# Patient Record
Sex: MALE | Race: WHITE | NOT HISPANIC OR LATINO | Employment: FULL TIME | URBAN - NONMETROPOLITAN AREA
[De-identification: names, ages, dates, MRNs, and addresses within clinical notes are randomized per-mention and may not be internally consistent; named-entity substitution may affect disease eponyms.]

---

## 2017-05-18 ENCOUNTER — TRANSCRIBE ORDERS (OUTPATIENT)
Dept: ADMINISTRATIVE | Facility: HOSPITAL | Age: 62
End: 2017-05-18

## 2017-05-18 DIAGNOSIS — R06.02 SHORTNESS OF BREATH: Primary | ICD-10-CM

## 2017-05-18 DIAGNOSIS — I10 ESSENTIAL HYPERTENSION: ICD-10-CM

## 2017-05-19 ENCOUNTER — APPOINTMENT (OUTPATIENT)
Dept: CARDIOLOGY | Facility: HOSPITAL | Age: 62
End: 2017-05-19

## 2017-05-23 ENCOUNTER — APPOINTMENT (OUTPATIENT)
Dept: CARDIOLOGY | Facility: HOSPITAL | Age: 62
End: 2017-05-23

## 2017-05-25 ENCOUNTER — HOSPITAL ENCOUNTER (OUTPATIENT)
Dept: CARDIOLOGY | Facility: HOSPITAL | Age: 62
Discharge: HOME | End: 2017-05-25
Admitting: NURSE PRACTITIONER

## 2017-05-25 VITALS
WEIGHT: 290 LBS | BODY MASS INDEX: 39.28 KG/M2 | HEIGHT: 72 IN | HEART RATE: 67 BPM | DIASTOLIC BLOOD PRESSURE: 79 MMHG | SYSTOLIC BLOOD PRESSURE: 143 MMHG

## 2017-05-25 DIAGNOSIS — I10 ESSENTIAL HYPERTENSION: ICD-10-CM

## 2017-05-25 DIAGNOSIS — R06.02 SHORTNESS OF BREATH: ICD-10-CM

## 2017-05-25 LAB
BH CV STRESS BP STAGE 1: NORMAL
BH CV STRESS BP STAGE 2: NORMAL
BH CV STRESS BP STAGE 3: NORMAL
BH CV STRESS BP STAGE 4: NORMAL
BH CV STRESS DOSE DOBUTAMINE STAGE 1: 10
BH CV STRESS DOSE DOBUTAMINE STAGE 2: 20
BH CV STRESS DOSE DOBUTAMINE STAGE 3: 30
BH CV STRESS DOSE DOBUTAMINE STAGE 4: 40
BH CV STRESS DURATION MIN STAGE 1: 3
BH CV STRESS DURATION MIN STAGE 2: 3
BH CV STRESS DURATION MIN STAGE 3: 3
BH CV STRESS DURATION MIN STAGE 4: 4
BH CV STRESS DURATION SEC STAGE 1: 0
BH CV STRESS DURATION SEC STAGE 2: 0
BH CV STRESS DURATION SEC STAGE 3: 0
BH CV STRESS DURATION SEC STAGE 4: 35
BH CV STRESS HR STAGE 1: 72
BH CV STRESS HR STAGE 2: 111
BH CV STRESS HR STAGE 3: 122
BH CV STRESS HR STAGE 4: 137
BH CV STRESS PROTOCOL 1: NORMAL
BH CV STRESS RECOVERY BP: NORMAL MMHG
BH CV STRESS RECOVERY HR: 98 BPM
BH CV STRESS STAGE 1: 1
BH CV STRESS STAGE 2: 2
BH CV STRESS STAGE 3: 3
BH CV STRESS STAGE 4: 4
MAXIMAL PREDICTED HEART RATE: 158 BPM
PERCENT MAX PREDICTED HR: 86.71 %
STRESS BASELINE BP: NORMAL MMHG
STRESS BASELINE HR: 65 BPM
STRESS PERCENT HR: 102 %
STRESS POST EXERCISE DUR MIN: 13 MIN
STRESS POST EXERCISE DUR SEC: 35 SEC
STRESS POST PEAK BP: NORMAL MMHG
STRESS POST PEAK HR: 137 BPM
STRESS TARGET HR: 134 BPM

## 2017-05-25 PROCEDURE — 25010000003 DOBUTAMINE PER 250 MG: Performed by: INTERNAL MEDICINE

## 2017-05-25 PROCEDURE — 93018 CV STRESS TEST I&R ONLY: CPT | Performed by: INTERNAL MEDICINE

## 2017-05-25 PROCEDURE — 93350 STRESS TTE ONLY: CPT | Performed by: INTERNAL MEDICINE

## 2017-05-25 PROCEDURE — C8928 TTE W OR W/O FOL W/CON,STRES: HCPCS

## 2017-05-25 PROCEDURE — 93017 CV STRESS TEST TRACING ONLY: CPT

## 2017-05-25 PROCEDURE — 93352 ADMIN ECG CONTRAST AGENT: CPT | Performed by: INTERNAL MEDICINE

## 2017-05-25 PROCEDURE — 25010000002 PERFLUTREN (DEFINITY) 8.476 MG IN SODIUM CHLORIDE 10 ML INJECTION: Performed by: INTERNAL MEDICINE

## 2017-05-25 RX ORDER — CHLORAL HYDRATE 500 MG
1000 CAPSULE ORAL
COMMUNITY
End: 2020-06-19

## 2017-05-25 RX ORDER — DOBUTAMINE HYDROCHLORIDE 100 MG/100ML
10-50 INJECTION INTRAVENOUS
Status: DISCONTINUED | OUTPATIENT
Start: 2017-05-25 | End: 2017-05-26 | Stop reason: HOSPADM

## 2017-05-25 RX ADMIN — ATROPINE SULFATE 0.6 MG: 0.1 INJECTION, SOLUTION INTRAVENOUS at 14:47

## 2017-05-25 RX ADMIN — SODIUM CHLORIDE 10 ML: 9 INJECTION INTRAMUSCULAR; INTRAVENOUS; SUBCUTANEOUS at 14:34

## 2017-05-25 RX ADMIN — DOBUTAMINE HYDROCHLORIDE 10 MCG/KG/MIN: 100 INJECTION INTRAVENOUS at 14:34

## 2017-06-08 ENCOUNTER — TRANSCRIBE ORDERS (OUTPATIENT)
Dept: ADMINISTRATIVE | Facility: HOSPITAL | Age: 62
End: 2017-06-08

## 2017-06-08 DIAGNOSIS — R06.02 SHORTNESS OF BREATH: Primary | ICD-10-CM

## 2017-06-15 ENCOUNTER — HOSPITAL ENCOUNTER (OUTPATIENT)
Dept: PULMONOLOGY | Facility: HOSPITAL | Age: 62
Discharge: HOME | End: 2017-06-15
Admitting: NURSE PRACTITIONER

## 2017-06-15 VITALS — BODY MASS INDEX: 41.95 KG/M2 | HEIGHT: 70 IN | WEIGHT: 293 LBS

## 2017-06-15 DIAGNOSIS — R06.02 SHORTNESS OF BREATH: ICD-10-CM

## 2017-06-15 PROCEDURE — 94726 PLETHYSMOGRAPHY LUNG VOLUMES: CPT

## 2017-06-15 PROCEDURE — 94729 DIFFUSING CAPACITY: CPT

## 2017-06-15 PROCEDURE — 94010 BREATHING CAPACITY TEST: CPT

## 2017-11-13 ENCOUNTER — HOSPITAL ENCOUNTER (OUTPATIENT)
Dept: CT IMAGING | Age: 62
Discharge: HOME | End: 2017-11-13
Payer: COMMERCIAL

## 2017-11-13 ENCOUNTER — OFFICE VISIT (OUTPATIENT)
Dept: URGENT CARE | Age: 62
End: 2017-11-13
Payer: COMMERCIAL

## 2017-11-13 ENCOUNTER — OFFICE VISIT (OUTPATIENT)
Dept: UROLOGY | Facility: CLINIC | Age: 62
End: 2017-11-13

## 2017-11-13 VITALS
HEART RATE: 68 BPM | WEIGHT: 281 LBS | TEMPERATURE: 99.1 F | SYSTOLIC BLOOD PRESSURE: 174 MMHG | BODY MASS INDEX: 38.06 KG/M2 | DIASTOLIC BLOOD PRESSURE: 103 MMHG | OXYGEN SATURATION: 96 % | HEIGHT: 72 IN

## 2017-11-13 VITALS — HEIGHT: 72 IN | DIASTOLIC BLOOD PRESSURE: 94 MMHG | SYSTOLIC BLOOD PRESSURE: 160 MMHG

## 2017-11-13 DIAGNOSIS — R10.9 FLANK PAIN: ICD-10-CM

## 2017-11-13 DIAGNOSIS — R10.31 RIGHT LOWER QUADRANT ABDOMINAL PAIN: ICD-10-CM

## 2017-11-13 DIAGNOSIS — N20.0 NEPHROLITHIASIS: Primary | ICD-10-CM

## 2017-11-13 DIAGNOSIS — N20.0 NEPHROLITHIASIS: ICD-10-CM

## 2017-11-13 DIAGNOSIS — R31.29 OTHER MICROSCOPIC HEMATURIA: ICD-10-CM

## 2017-11-13 DIAGNOSIS — N20.1 RIGHT URETERAL STONE: Primary | ICD-10-CM

## 2017-11-13 DIAGNOSIS — N20.0 KIDNEY STONES: Primary | ICD-10-CM

## 2017-11-13 LAB
ALBUMIN SERPL-MCNC: 4 G/DL (ref 3.5–5.2)
ALP BLD-CCNC: 95 U/L (ref 40–130)
ALT SERPL-CCNC: 24 U/L (ref 5–41)
ANION GAP SERPL CALCULATED.3IONS-SCNC: 13 MMOL/L (ref 7–19)
AST SERPL-CCNC: 18 U/L (ref 5–40)
BASOPHILS ABSOLUTE: 0.1 K/UL (ref 0–0.2)
BASOPHILS RELATIVE PERCENT: 0.5 % (ref 0–1)
BILIRUB BLD-MCNC: NEGATIVE MG/DL
BILIRUB SERPL-MCNC: 0.6 MG/DL (ref 0.2–1.2)
BILIRUBIN, POC: ABNORMAL
BLOOD URINE, POC: ABNORMAL
BUN BLDV-MCNC: 12 MG/DL (ref 8–23)
CALCIUM SERPL-MCNC: 9.2 MG/DL (ref 8.8–10.2)
CHLORIDE BLD-SCNC: 102 MMOL/L (ref 98–111)
CLARITY, POC: ABNORMAL
CLARITY, POC: CLEAR
CO2: 28 MMOL/L (ref 22–29)
COLOR UR: YELLOW
COLOR, POC: ABNORMAL
CREAT SERPL-MCNC: 0.8 MG/DL (ref 0.5–1.2)
EOSINOPHILS ABSOLUTE: 0.1 K/UL (ref 0–0.6)
EOSINOPHILS RELATIVE PERCENT: 0.6 % (ref 0–5)
GFR NON-AFRICAN AMERICAN: >60
GLUCOSE BLD-MCNC: 117 MG/DL (ref 74–109)
GLUCOSE UR STRIP-MCNC: NEGATIVE MG/DL
GLUCOSE URINE, POC: ABNORMAL
HCT VFR BLD CALC: 45.7 % (ref 42–52)
HEMOGLOBIN: 15.2 G/DL (ref 14–18)
KETONES UR QL: NEGATIVE
KETONES, POC: ABNORMAL
LEUKOCYTE EST, POC: ABNORMAL
LEUKOCYTE EST, POC: ABNORMAL
LYMPHOCYTES ABSOLUTE: 1.7 K/UL (ref 1.1–4.5)
LYMPHOCYTES RELATIVE PERCENT: 17.1 % (ref 20–40)
MCH RBC QN AUTO: 28.5 PG (ref 27–31)
MCHC RBC AUTO-ENTMCNC: 33.3 G/DL (ref 33–37)
MCV RBC AUTO: 85.7 FL (ref 80–94)
MONOCYTES ABSOLUTE: 0.8 K/UL (ref 0–0.9)
MONOCYTES RELATIVE PERCENT: 7.8 % (ref 0–10)
NEUTROPHILS ABSOLUTE: 7.1 K/UL (ref 1.5–7.5)
NEUTROPHILS RELATIVE PERCENT: 73.5 % (ref 50–65)
NITRITE UR-MCNC: NEGATIVE MG/ML
NITRITE, POC: ABNORMAL
PDW BLD-RTO: 13.4 % (ref 11.5–14.5)
PH UR: 6 [PH] (ref 5–8)
PH, POC: 7
PLATELET # BLD: 221 K/UL (ref 130–400)
PMV BLD AUTO: 10.3 FL (ref 9.4–12.4)
POTASSIUM SERPL-SCNC: 4.1 MMOL/L (ref 3.5–5)
PROT UR STRIP-MCNC: NEGATIVE MG/DL
PROTEIN, POC: ABNORMAL
RBC # BLD: 5.33 M/UL (ref 4.7–6.1)
RBC # UR STRIP: ABNORMAL /UL
SODIUM BLD-SCNC: 143 MMOL/L (ref 136–145)
SP GR UR: 1.01 (ref 1–1.03)
SPECIFIC GRAVITY, POC: 1.01
TOTAL PROTEIN: 6.8 G/DL (ref 6.6–8.7)
UROBILINOGEN UR QL: ABNORMAL
UROBILINOGEN, POC: 0.2
WBC # BLD: 9.7 K/UL (ref 4.8–10.8)

## 2017-11-13 PROCEDURE — 99204 OFFICE O/P NEW MOD 45 MIN: CPT | Performed by: UROLOGY

## 2017-11-13 PROCEDURE — 81002 URINALYSIS NONAUTO W/O SCOPE: CPT | Performed by: PHYSICIAN ASSISTANT

## 2017-11-13 PROCEDURE — 74176 CT ABD & PELVIS W/O CONTRAST: CPT

## 2017-11-13 PROCEDURE — 81003 URINALYSIS AUTO W/O SCOPE: CPT | Performed by: UROLOGY

## 2017-11-13 PROCEDURE — 36415 COLL VENOUS BLD VENIPUNCTURE: CPT | Performed by: PHYSICIAN ASSISTANT

## 2017-11-13 PROCEDURE — 99202 OFFICE O/P NEW SF 15 MIN: CPT | Performed by: PHYSICIAN ASSISTANT

## 2017-11-13 RX ORDER — HYDROCODONE BITARTRATE AND ACETAMINOPHEN 5; 325 MG/1; MG/1
1 TABLET ORAL EVERY 6 HOURS PRN
Qty: 20 TABLET | Refills: 0 | Status: SHIPPED | OUTPATIENT
Start: 2017-11-13 | End: 2017-11-20

## 2017-11-13 RX ORDER — LOSARTAN POTASSIUM 100 MG/1
TABLET ORAL
Refills: 3 | Status: ON HOLD | COMMUNITY
Start: 2017-09-18 | End: 2018-10-24

## 2017-11-13 RX ORDER — TAMSULOSIN HYDROCHLORIDE 0.4 MG/1
1 CAPSULE ORAL DAILY
Status: ON HOLD | COMMUNITY
Start: 2017-11-13 | End: 2017-11-18 | Stop reason: SDUPTHER

## 2017-11-13 RX ORDER — MONTELUKAST SODIUM 10 MG/1
TABLET ORAL
Refills: 5 | Status: ON HOLD | COMMUNITY
Start: 2017-10-31 | End: 2017-11-18

## 2017-11-13 RX ORDER — SODIUM CHLORIDE 9 MG/ML
100 INJECTION, SOLUTION INTRAVENOUS CONTINUOUS
Status: CANCELLED | OUTPATIENT
Start: 2017-11-13

## 2017-11-13 RX ORDER — AMLODIPINE BESYLATE 5 MG/1
TABLET ORAL
Refills: 3 | COMMUNITY
Start: 2017-09-18 | End: 2018-10-09 | Stop reason: DRUGHIGH

## 2017-11-13 RX ORDER — TAMSULOSIN HYDROCHLORIDE 0.4 MG/1
0.4 CAPSULE ORAL DAILY
Qty: 30 CAPSULE | Refills: 3 | Status: SHIPPED | OUTPATIENT
Start: 2017-11-13 | End: 2018-10-09 | Stop reason: ALTCHOICE

## 2017-11-13 RX ORDER — HYDROCODONE BITARTRATE AND ACETAMINOPHEN 5; 325 MG/1; MG/1
1 TABLET ORAL EVERY 6 HOURS PRN
COMMUNITY
Start: 2017-11-13 | End: 2017-11-20

## 2017-11-13 ASSESSMENT — ENCOUNTER SYMPTOMS
RHINORRHEA: 0
COUGH: 0
BLOOD IN STOOL: 0
SORE THROAT: 0
VOMITING: 1
DIARRHEA: 0
SHORTNESS OF BREATH: 0
BACK PAIN: 1
ABDOMINAL PAIN: 1
NAUSEA: 1

## 2017-11-13 NOTE — PATIENT INSTRUCTIONS
again.  Preventing future kidney stones  Some changes in your diet may help prevent kidney stones. Depending on the cause of your stones, your doctor may recommend that you:  · Drink plenty of fluids, enough so that your urine is light yellow or clear like water. If you have kidney, heart, or liver disease and have to limit fluids, talk with your doctor before you increase the amount of fluids you drink. · Limit coffee, tea, and alcohol. Also avoid grapefruit juice. · Do not take more than the recommended daily dose of vitamins C and D.  · Avoid antacids such as Gaviscon, Maalox, Mylanta, or Tums. · Limit the amount of salt (sodium) in your diet. · Eat a balanced diet that is not too high in protein. · Limit foods that are high in a substance called oxalate, which can cause kidney stones. These foods include dark green vegetables, rhubarb, chocolate, wheat bran, nuts, cranberries, and beans. When should you call for help? Call your doctor now or seek immediate medical care if:  · You cannot keep down fluids. · Your pain gets worse. · You have a fever or chills. · You have new or worse pain in your back just below your rib cage (the flank area). · You have new or more blood in your urine. Watch closely for changes in your health, and be sure to contact your doctor if:  · You do not get better as expected. Where can you learn more? Go to https://F[employer]em[employer]umpedia. org and sign in to your [employer] account. Enter I228 in the [employer] box to learn more about \"Kidney Stone: Care Instructions. \"     If you do not have an account, please click on the \"Sign Up Now\" link. Current as of: April 3, 2017  Content Version: 11.3  © 2117-9652 [employer]. Care instructions adapted under license by [facility] ([facility]).  If you have questions about a medical condition or this instruction, always ask your healthcare professional. Natalie Lujan disclaims any warranty or liability

## 2017-11-13 NOTE — PROGRESS NOTES
Subjective:      Patient ID: Magdaleno Murillo is a 57 y.o. male. HPI    Alyson Arroyo presents today with right-sided flank pain. Has had some RLQ pain. Symptoms developed Symptoms developed 6 days ago with nausea and vomiting. Back pain developed after that. Though that he had strained a muscle after the vomiting. Has continued to back pain every since then. Had slight nausea this morning. Has had low grade fever sporadically for the past week. Denies diarrhea. Has only had 2 bowel movements in the past 6 days that have been hard and dark. Denies rash. No dysuria. Has noticed urinary frequency and decreased urine volume. BP is elevated today. He is on 2 blood pressure medications and has not taken them yet today. Has taken Tylenol. Took Laura Matlock this morning. Review of Systems   Constitutional: Positive for appetite change and fever. HENT: Negative for congestion, ear pain, rhinorrhea and sore throat. Respiratory: Negative for cough and shortness of breath. Cardiovascular: Negative for chest pain. Gastrointestinal: Positive for abdominal pain, nausea and vomiting. Negative for blood in stool and diarrhea. Genitourinary: Positive for decreased urine volume, flank pain and frequency. Negative for dysuria, hematuria and urgency. Musculoskeletal: Positive for back pain. Skin: Negative for rash. Neurological: Negative for headaches. All other systems reviewed and are negative. Objective:   Physical Exam   Constitutional: He is oriented to person, place, and time. He appears well-developed and well-nourished. No distress. HENT:   Head: Normocephalic and atraumatic. Right Ear: External ear normal.   Left Ear: External ear normal.   Nose: Nose normal.   Mouth/Throat: Oropharynx is clear and moist. No oropharyngeal exudate. Eyes: Conjunctivae are normal. Right eye exhibits no discharge. Left eye exhibits no discharge. Neck: Normal range of motion. Neck supple.    Cardiovascular: Normal rate, regular rhythm and normal heart sounds. No murmur heard. Pulmonary/Chest: Effort normal and breath sounds normal. No respiratory distress. He has no wheezes. He has no rales. Abdominal: Soft. Bowel sounds are normal. He exhibits no distension and no mass. There is no tenderness. There is no rebound and no guarding. Lymphadenopathy:     He has no cervical adenopathy. Neurological: He is alert and oriented to person, place, and time. Skin: Skin is warm and dry. No rash noted. He is not diaphoretic. No erythema. No pallor. Psychiatric: He has a normal mood and affect. His behavior is normal. Judgment and thought content normal.   Nursing note and vitals reviewed. Assessment:       Nephrolithiasis  Flank Pain  Hematuria      Plan:      - Lab: CBC, CMP  - CT abdomen/pelvis with oral contrast:   An 8 mm x 5 mm radiopaque calculus at the right UP   junction and right-sided hydronephrosis. Bilateral small nonobstructing calculi. - Refer to urology for further evaluation and treatment. Patient is being seen today by Dr. Ilda De La Rosa. - Start Flomax today. Appropriate dosage and instructions provided. - Instructed to strain urine and collect stone once passes and bring to lab for pathological evaluation.   - Instructed to go to ER if symptoms worsen before appt with urology.   - Start Norco 5 mg. Sent to pharmacy by Dr. Kendra Swain.   - Continue all other medications as prescribed. - Notify clinic with any questions or concerns.   - Return as needed.

## 2017-11-13 NOTE — PROGRESS NOTES
Subjective    Mr. Edmondson is 61 y.o. male    Chief Complaint: Right Flank Pain    History of Present Illness     Urolithiasis  Patient complains of right flank pain with radiation to the abdomen. Onset of symptoms was abrupt starting 1 week ago with unchanged course since that time. Patient describes the pain as colicky, continuous and rated as moderate. The patient has had nausea and vomiting. There has been no fever or chills. The patient is not complaining of dysuria, frequency, or urgency.  Previous management of stones includes none      The following portions of the patient's history were reviewed and updated as appropriate: allergies, current medications, past family history, past medical history, past social history, past surgical history and problem list.    Review of Systems   Constitutional: Negative for appetite change, chills, fever and unexpected weight change.   HENT: Negative for congestion, ear pain, facial swelling, hearing loss, nosebleeds, trouble swallowing and voice change.    Eyes: Negative for photophobia, pain, discharge and visual disturbance.   Respiratory: Negative for cough, choking, chest tightness and shortness of breath.    Cardiovascular: Negative for chest pain and palpitations.   Gastrointestinal: Positive for abdominal pain. Negative for abdominal distention, blood in stool, constipation, diarrhea, nausea and vomiting.   Endocrine: Negative for cold intolerance, heat intolerance and polydipsia.   Genitourinary: Positive for flank pain. Negative for difficulty urinating, dysuria, frequency, hematuria and urgency.   Musculoskeletal: Negative for arthralgias, joint swelling, neck pain and neck stiffness.   Skin: Negative for pallor and rash.   Allergic/Immunologic: Negative for immunocompromised state.   Neurological: Negative for dizziness, tremors, seizures, syncope, light-headedness and headaches.   Hematological: Negative for adenopathy. Does not bruise/bleed easily.  "  Psychiatric/Behavioral: Negative for agitation, confusion, dysphoric mood, hallucinations, self-injury and suicidal ideas.         Current Outpatient Prescriptions:   •  AMLODIPINE BESYLATE PO, Take  by mouth., Disp: , Rfl:   •  Coenzyme Q10 (CO Q 10 PO), Take  by mouth., Disp: , Rfl:   •  Fexofenadine HCl (ALLEGRA PO), Take  by mouth., Disp: , Rfl:   •  LOSARTAN POTASSIUM PO, Take  by mouth., Disp: , Rfl:   •  montelukast (SINGULAIR) 10 MG tablet, TAKE 1 TABLET BY MOUTH DAILY, Disp: , Rfl: 5  •  NAPROXEN PO, Take  by mouth., Disp: , Rfl:   •  Omega-3 Fatty Acids (FISH OIL) 1000 MG capsule capsule, Take  by mouth Daily With Breakfast., Disp: , Rfl:   •  tamsulosin (FLOMAX) 0.4 MG capsule 24 hr capsule, , Disp: , Rfl:     Past Medical History:   Diagnosis Date   • Hyperlipidemia    • Hypertension        History reviewed. No pertinent surgical history.    Social History     Social History   • Marital status:      Spouse name: N/A   • Number of children: N/A   • Years of education: N/A     Social History Main Topics   • Smoking status: Former Smoker   • Smokeless tobacco: Never Used   • Alcohol use Yes   • Drug use: None   • Sexual activity: Not Asked     Other Topics Concern   • None     Social History Narrative       Family History   Problem Relation Age of Onset   • Hypertension Father        Objective    /94  Ht 72\" (182.9 cm)    Physical Exam   Constitutional: He is oriented to person, place, and time. He appears well-developed and well-nourished. No distress.   HENT:   Head: Normocephalic and atraumatic.   Right Ear: External ear and ear canal normal.   Left Ear: External ear and ear canal normal.   Nose: No nasal deformity. No epistaxis.   Mouth/Throat: Oropharynx is clear and moist. Mucous membranes are not pale, not dry and not cyanotic. Normal dentition. No oropharyngeal exudate.   Neck: Trachea normal. No tracheal tenderness present. No tracheal deviation present. No thyroid mass and no " thyromegaly present.   Pulmonary/Chest: Effort normal. No accessory muscle usage. No respiratory distress. Chest wall is not dull to percussion (No flatness or hyperresonance). He exhibits no mass and no tenderness.   On palpation, no tactile fremitus. All movements are symmetric. No intercostal retraction noted.    Abdominal: Soft. Normal appearance. He exhibits no distension and no mass. There is no hepatosplenomegaly. There is no tenderness. No hernia.   Rectal examination or stool specimen is not indicated.    Musculoskeletal:   Normal gait and station. The spine, ribs, and pelvis are examined. No obvious misalignment or asymmetry. ROM is reasonable for age. No instability. No obvious atrophy, flaccidity or spasticity.    Lymphadenopathy:     He has no cervical adenopathy.        Right: No inguinal adenopathy present.        Left: No inguinal adenopathy present.   Neurological: He is alert and oriented to person, place, and time.   Skin: Skin is warm, dry and intact. No lesion and no rash noted. He is not diaphoretic. No cyanosis. No pallor. Nails show no clubbing.   On palpation, there were no induration, subcutaneous nodules, or tightening   Psychiatric: His speech is normal and behavior is normal. Judgment and thought content normal. His mood appears not anxious. His affect is not labile. He does not exhibit a depressed mood.   Vitals reviewed.          Results for orders placed or performed in visit on 11/13/17   POC Urinalysis Dipstick, Automated   Result Value Ref Range    Color Yellow Yellow, Straw, Dark Yellow, Rachel    Clarity, UA Clear Clear    Glucose, UA Negative Negative, 1000 mg/dL (3+) mg/dL    Bilirubin Negative Negative    Ketones, UA Negative Negative    Specific Gravity  1.015 1.005 - 1.030    Blood, UA Moderate (A) Negative    pH, Urine 6.0 5.0 - 8.0    Protein, POC Negative Negative mg/dL    Urobilinogen, UA 1 E.U./dL  (A) Normal    Leukocytes Small (1+) (A) Negative    Nitrite, UA Negative  Negative   Microscopic Urinalysis  I inspected the urine myself based on the clinical situation including the dipstick urine. The urine is spun in a centrifuge for three minutes. The spun urine shows 12-20 rbc/hpf, none wbc/hpf, none epi/hpf, negative bacteria, negative crystals, and negative casts.     KUB independent review    A KUB is available for me to review today.  The image is inspected for a bowel gas pattern and the general bone structure of the spine and pelvis. The kidneys are then inspected closely.  Renal outline is noted if identifiable. The kidney, collecting system, and anticipated path of the ureter are examined for calcifications including those in the true pelvis.  This film reveals:    On the right there 8mm stone proximal ureter 3mm stone in kidney.    On the left there are no calcificaitons seen in the kidney or the expected course of the ureter. .        Assessment and Plan    Diagnoses and all orders for this visit:    Right ureteral stone  -     POC Urinalysis Dipstick, Automated  -     Case Request; Standing  -     sodium chloride 0.9 % infusion; Infuse 100 mL/hr into a venous catheter Continuous.  -     ceFAZolin (ANCEF) 2 g in sodium chloride 0.9 % 100 mL IVPB; Infuse 2 g into a venous catheter 1 (One) Time.  -     Case Request    Nephrolithiasis    Other orders  -     Provide instructions to patient on NPO status  -     Obtain informed consent  -     Follow Anesthesia Guidelines / Standing Orders; Standing  -     Verify NPO Status; Standing  -     DIONNE hose- To be placed on patient in pre-op; Standing  -     SCD (sequential compression device)- to be placed on patient in Pre-op; Standing  -     Follow Anesthesia Guidelines / Standing Orders; Future              Ureteroscopy  The patient has a proximal ureteral calculus as defined by symptoms and radiographic studies.  Options for the management of this stone are discussed based upon size, location, symptoms, and probable composition  including watchful waiting, expulsive therapy, ESWL, ureteral stenting, percutaneous management, and open approaches.  Based upon this discussed, The patient has elected to proceed with ureteroscopic management of the stone.  Mr. Edmondson understands that a laser or other fragmentation aid may be needed.  The need for ureteral stenting and subsequent removal is also discussed.  Risks of bleeding, infection, damage to urethra or bladder, ureteral perforation or avulsion, pain, and post operative stent discomfort are all discussed.  I discussed the need for return follow up for stent removal, and that failure to do so could result in significant infection, further stone formation, need for surgical intervention to remove the stent, or permanent kidney damage.  All questions were answered to the patient's satifaction      8mm proximal ureteral stone with a 3-4 mm stone in his kidney we will plan for ureteroscopic management of these near future.

## 2017-11-14 ENCOUNTER — APPOINTMENT (OUTPATIENT)
Dept: GENERAL RADIOLOGY | Facility: HOSPITAL | Age: 62
End: 2017-11-14

## 2017-11-14 ENCOUNTER — ANESTHESIA (OUTPATIENT)
Dept: PERIOP | Facility: HOSPITAL | Age: 62
End: 2017-11-14

## 2017-11-14 ENCOUNTER — ANESTHESIA EVENT (OUTPATIENT)
Dept: PERIOP | Facility: HOSPITAL | Age: 62
End: 2017-11-14

## 2017-11-14 ENCOUNTER — HOSPITAL ENCOUNTER (OUTPATIENT)
Facility: HOSPITAL | Age: 62
Setting detail: HOSPITAL OUTPATIENT SURGERY
Discharge: HOME | End: 2017-11-14
Attending: UROLOGY | Admitting: UROLOGY

## 2017-11-14 VITALS
HEART RATE: 69 BPM | OXYGEN SATURATION: 93 % | HEIGHT: 72 IN | SYSTOLIC BLOOD PRESSURE: 204 MMHG | WEIGHT: 281 LBS | RESPIRATION RATE: 16 BRPM | DIASTOLIC BLOOD PRESSURE: 92 MMHG | TEMPERATURE: 97.2 F | BODY MASS INDEX: 38.06 KG/M2

## 2017-11-14 DIAGNOSIS — N20.1 RIGHT URETERAL STONE: ICD-10-CM

## 2017-11-14 PROCEDURE — 52356 CYSTO/URETERO W/LITHOTRIPSY: CPT | Performed by: UROLOGY

## 2017-11-14 PROCEDURE — 74420 UROGRAPHY RTRGR +-KUB: CPT

## 2017-11-14 PROCEDURE — C1894 INTRO/SHEATH, NON-LASER: HCPCS | Performed by: UROLOGY

## 2017-11-14 PROCEDURE — 25010000002 PROPOFOL 10 MG/ML EMULSION: Performed by: NURSE ANESTHETIST, CERTIFIED REGISTERED

## 2017-11-14 PROCEDURE — 25010000002 ONDANSETRON PER 1 MG: Performed by: NURSE ANESTHETIST, CERTIFIED REGISTERED

## 2017-11-14 PROCEDURE — 93005 ELECTROCARDIOGRAM TRACING: CPT | Performed by: UROLOGY

## 2017-11-14 PROCEDURE — C1769 GUIDE WIRE: HCPCS | Performed by: UROLOGY

## 2017-11-14 PROCEDURE — 25010000003 CEFAZOLIN PER 500 MG: Performed by: UROLOGY

## 2017-11-14 PROCEDURE — 0 IOPAMIDOL 61 % SOLUTION: Performed by: UROLOGY

## 2017-11-14 PROCEDURE — 74420 UROGRAPHY RTRGR +-KUB: CPT | Performed by: UROLOGY

## 2017-11-14 PROCEDURE — 25010000002 NEOSTIGMINE PER 0.5 MG: Performed by: NURSE ANESTHETIST, CERTIFIED REGISTERED

## 2017-11-14 PROCEDURE — C1758 CATHETER, URETERAL: HCPCS | Performed by: UROLOGY

## 2017-11-14 PROCEDURE — 25010000002 FENTANYL CITRATE (PF) 100 MCG/2ML SOLUTION: Performed by: ANESTHESIOLOGY

## 2017-11-14 PROCEDURE — 93010 ELECTROCARDIOGRAM REPORT: CPT | Performed by: INTERNAL MEDICINE

## 2017-11-14 PROCEDURE — C2617 STENT, NON-COR, TEM W/O DEL: HCPCS | Performed by: UROLOGY

## 2017-11-14 PROCEDURE — 25010000002 DEXAMETHASONE PER 1 MG: Performed by: NURSE ANESTHETIST, CERTIFIED REGISTERED

## 2017-11-14 DEVICE — URETERAL STENT
Type: IMPLANTABLE DEVICE | Site: URETER | Status: FUNCTIONAL
Brand: PERCUFLEX™ PLUS

## 2017-11-14 RX ORDER — HYDROCODONE BITARTRATE AND ACETAMINOPHEN 7.5; 325 MG/1; MG/1
1 TABLET ORAL ONCE AS NEEDED
Status: DISCONTINUED | OUTPATIENT
Start: 2017-11-14 | End: 2017-11-14 | Stop reason: HOSPADM

## 2017-11-14 RX ORDER — SODIUM CHLORIDE 0.9 % (FLUSH) 0.9 %
1-10 SYRINGE (ML) INJECTION AS NEEDED
Status: DISCONTINUED | OUTPATIENT
Start: 2017-11-14 | End: 2017-11-14 | Stop reason: HOSPADM

## 2017-11-14 RX ORDER — HYDRALAZINE HYDROCHLORIDE 20 MG/ML
5 INJECTION INTRAMUSCULAR; INTRAVENOUS
Status: DISCONTINUED | OUTPATIENT
Start: 2017-11-14 | End: 2017-11-14 | Stop reason: HOSPADM

## 2017-11-14 RX ORDER — MORPHINE SULFATE 2 MG/ML
2 INJECTION, SOLUTION INTRAMUSCULAR; INTRAVENOUS
Status: DISCONTINUED | OUTPATIENT
Start: 2017-11-14 | End: 2017-11-14 | Stop reason: HOSPADM

## 2017-11-14 RX ORDER — LABETALOL HYDROCHLORIDE 5 MG/ML
5 INJECTION, SOLUTION INTRAVENOUS
Status: DISCONTINUED | OUTPATIENT
Start: 2017-11-14 | End: 2017-11-14 | Stop reason: HOSPADM

## 2017-11-14 RX ORDER — MAGNESIUM HYDROXIDE 1200 MG/15ML
LIQUID ORAL AS NEEDED
Status: DISCONTINUED | OUTPATIENT
Start: 2017-11-14 | End: 2017-11-14 | Stop reason: HOSPADM

## 2017-11-14 RX ORDER — DEXAMETHASONE SODIUM PHOSPHATE 4 MG/ML
INJECTION, SOLUTION INTRA-ARTICULAR; INTRALESIONAL; INTRAMUSCULAR; INTRAVENOUS; SOFT TISSUE AS NEEDED
Status: DISCONTINUED | OUTPATIENT
Start: 2017-11-14 | End: 2017-11-14 | Stop reason: SURG

## 2017-11-14 RX ORDER — DIPHENHYDRAMINE HYDROCHLORIDE 50 MG/ML
12.5 INJECTION INTRAMUSCULAR; INTRAVENOUS
Status: DISCONTINUED | OUTPATIENT
Start: 2017-11-14 | End: 2017-11-14 | Stop reason: HOSPADM

## 2017-11-14 RX ORDER — SODIUM CHLORIDE 0.9 % (FLUSH) 0.9 %
3 SYRINGE (ML) INJECTION AS NEEDED
Status: DISCONTINUED | OUTPATIENT
Start: 2017-11-14 | End: 2017-11-14 | Stop reason: HOSPADM

## 2017-11-14 RX ORDER — LIDOCAINE HYDROCHLORIDE 20 MG/ML
INJECTION, SOLUTION INFILTRATION; PERINEURAL AS NEEDED
Status: DISCONTINUED | OUTPATIENT
Start: 2017-11-14 | End: 2017-11-14 | Stop reason: SURG

## 2017-11-14 RX ORDER — LIDOCAINE HYDROCHLORIDE 40 MG/ML
SOLUTION TOPICAL AS NEEDED
Status: DISCONTINUED | OUTPATIENT
Start: 2017-11-14 | End: 2017-11-14 | Stop reason: SURG

## 2017-11-14 RX ORDER — ONDANSETRON 2 MG/ML
4 INJECTION INTRAMUSCULAR; INTRAVENOUS ONCE AS NEEDED
Status: DISCONTINUED | OUTPATIENT
Start: 2017-11-14 | End: 2017-11-14 | Stop reason: HOSPADM

## 2017-11-14 RX ORDER — GLYCOPYRROLATE 0.2 MG/ML
INJECTION INTRAMUSCULAR; INTRAVENOUS AS NEEDED
Status: DISCONTINUED | OUTPATIENT
Start: 2017-11-14 | End: 2017-11-14 | Stop reason: SURG

## 2017-11-14 RX ORDER — ONDANSETRON 4 MG/1
4 TABLET, FILM COATED ORAL ONCE AS NEEDED
Status: DISCONTINUED | OUTPATIENT
Start: 2017-11-14 | End: 2017-11-14 | Stop reason: HOSPADM

## 2017-11-14 RX ORDER — PHENAZOPYRIDINE HYDROCHLORIDE 100 MG/1
100 TABLET, FILM COATED ORAL 3 TIMES DAILY PRN
Qty: 21 TABLET | Refills: 1 | Status: SHIPPED | OUTPATIENT
Start: 2017-11-14 | End: 2017-12-18

## 2017-11-14 RX ORDER — FENTANYL CITRATE 50 UG/ML
25 INJECTION, SOLUTION INTRAMUSCULAR; INTRAVENOUS
Status: DISCONTINUED | OUTPATIENT
Start: 2017-11-14 | End: 2017-11-14 | Stop reason: HOSPADM

## 2017-11-14 RX ORDER — NALOXONE HCL 0.4 MG/ML
0.4 VIAL (ML) INJECTION AS NEEDED
Status: DISCONTINUED | OUTPATIENT
Start: 2017-11-14 | End: 2017-11-14 | Stop reason: HOSPADM

## 2017-11-14 RX ORDER — IPRATROPIUM BROMIDE AND ALBUTEROL SULFATE 2.5; .5 MG/3ML; MG/3ML
3 SOLUTION RESPIRATORY (INHALATION) ONCE AS NEEDED
Status: DISCONTINUED | OUTPATIENT
Start: 2017-11-14 | End: 2017-11-14 | Stop reason: HOSPADM

## 2017-11-14 RX ORDER — TAMSULOSIN HYDROCHLORIDE 0.4 MG/1
1 CAPSULE ORAL NIGHTLY
Qty: 14 CAPSULE | Refills: 0 | Status: SHIPPED | OUTPATIENT
Start: 2017-11-14 | End: 2017-12-18

## 2017-11-14 RX ORDER — MEPERIDINE HYDROCHLORIDE 25 MG/ML
12.5 INJECTION INTRAMUSCULAR; INTRAVENOUS; SUBCUTANEOUS
Status: DISCONTINUED | OUTPATIENT
Start: 2017-11-14 | End: 2017-11-14 | Stop reason: HOSPADM

## 2017-11-14 RX ORDER — SODIUM CHLORIDE, SODIUM LACTATE, POTASSIUM CHLORIDE, CALCIUM CHLORIDE 600; 310; 30; 20 MG/100ML; MG/100ML; MG/100ML; MG/100ML
1000 INJECTION, SOLUTION INTRAVENOUS CONTINUOUS
Status: DISCONTINUED | OUTPATIENT
Start: 2017-11-14 | End: 2017-11-14 | Stop reason: HOSPADM

## 2017-11-14 RX ORDER — SODIUM CHLORIDE, SODIUM LACTATE, POTASSIUM CHLORIDE, CALCIUM CHLORIDE 600; 310; 30; 20 MG/100ML; MG/100ML; MG/100ML; MG/100ML
9 INJECTION, SOLUTION INTRAVENOUS CONTINUOUS
Status: DISCONTINUED | OUTPATIENT
Start: 2017-11-14 | End: 2017-11-14 | Stop reason: HOSPADM

## 2017-11-14 RX ORDER — SODIUM CHLORIDE 9 MG/ML
100 INJECTION, SOLUTION INTRAVENOUS CONTINUOUS
Status: DISCONTINUED | OUTPATIENT
Start: 2017-11-14 | End: 2017-11-14 | Stop reason: HOSPADM

## 2017-11-14 RX ORDER — PROPOFOL 10 MG/ML
VIAL (ML) INTRAVENOUS AS NEEDED
Status: DISCONTINUED | OUTPATIENT
Start: 2017-11-14 | End: 2017-11-14 | Stop reason: SURG

## 2017-11-14 RX ORDER — ROCURONIUM BROMIDE 10 MG/ML
INJECTION, SOLUTION INTRAVENOUS AS NEEDED
Status: DISCONTINUED | OUTPATIENT
Start: 2017-11-14 | End: 2017-11-14 | Stop reason: SURG

## 2017-11-14 RX ORDER — DEXTROSE MONOHYDRATE 25 G/50ML
12.5 INJECTION, SOLUTION INTRAVENOUS AS NEEDED
Status: DISCONTINUED | OUTPATIENT
Start: 2017-11-14 | End: 2017-11-14 | Stop reason: HOSPADM

## 2017-11-14 RX ORDER — ONDANSETRON 2 MG/ML
INJECTION INTRAMUSCULAR; INTRAVENOUS AS NEEDED
Status: DISCONTINUED | OUTPATIENT
Start: 2017-11-14 | End: 2017-11-14 | Stop reason: SURG

## 2017-11-14 RX ORDER — KETOROLAC TROMETHAMINE 10 MG/1
10 TABLET, FILM COATED ORAL EVERY 6 HOURS PRN
Qty: 20 TABLET | Refills: 0 | Status: ON HOLD | OUTPATIENT
Start: 2017-11-14 | End: 2017-11-18

## 2017-11-14 RX ADMIN — PROPOFOL 200 MG: 10 INJECTION, EMULSION INTRAVENOUS at 17:39

## 2017-11-14 RX ADMIN — LIDOCAINE HYDROCHLORIDE 1 EACH: 40 SOLUTION TOPICAL at 17:39

## 2017-11-14 RX ADMIN — FENTANYL CITRATE 100 MCG: 50 INJECTION, SOLUTION INTRAMUSCULAR; INTRAVENOUS at 17:39

## 2017-11-14 RX ADMIN — SODIUM CHLORIDE, POTASSIUM CHLORIDE, SODIUM LACTATE AND CALCIUM CHLORIDE 1000 ML: 600; 310; 30; 20 INJECTION, SOLUTION INTRAVENOUS at 15:30

## 2017-11-14 RX ADMIN — ROCURONIUM BROMIDE 30 MG: 10 INJECTION INTRAVENOUS at 17:39

## 2017-11-14 RX ADMIN — LIDOCAINE HYDROCHLORIDE 100 MG: 20 INJECTION, SOLUTION INFILTRATION; PERINEURAL at 17:39

## 2017-11-14 RX ADMIN — CEFAZOLIN SODIUM 2 G: 2 SOLUTION INTRAVENOUS at 17:45

## 2017-11-14 RX ADMIN — ONDANSETRON HYDROCHLORIDE 4 MG: 2 SOLUTION INTRAMUSCULAR; INTRAVENOUS at 18:00

## 2017-11-14 RX ADMIN — GLYCOPYRROLATE 0.3 MG: 0.2 INJECTION, SOLUTION INTRAMUSCULAR; INTRAVENOUS at 18:24

## 2017-11-14 RX ADMIN — DEXAMETHASONE SODIUM PHOSPHATE 4 MG: 4 INJECTION, SOLUTION INTRAMUSCULAR; INTRAVENOUS at 18:00

## 2017-11-14 RX ADMIN — Medication 3 MG: at 18:24

## 2017-11-14 NOTE — ANESTHESIA PROCEDURE NOTES
Airway  Urgency: elective    Date/Time: 11/14/2017 5:42 PM  Difficult airway    General Information and Staff    Patient location during procedure: OR  CRNA: BRIONNA LOTT    Indications and Patient Condition  Indications for airway management: airway protection    Preoxygenated: yes  Mask difficulty assessment: 2 - vent by mask + OA or adjuvant +/- NMBA    Final Airway Details  Final airway type: endotracheal airway      Successful airway: ETT  Cuffed: yes   Successful intubation technique: video laryngoscopy  Facilitating devices/methods: intubating stylet  Blade: Miguel  Blade size: #4  ETT size: 7.5 mm  Cormack-Lehane Classification: grade IIb - view of arytenoids or posterior of glottis only  Placement verified by: chest auscultation and capnometry   Measured from: lips  ETT to lips (cm): 23  Number of attempts at approach: 2

## 2017-11-14 NOTE — ANESTHESIA PREPROCEDURE EVALUATION
Anesthesia Evaluation     Patient summary reviewed   no history of anesthetic complications:  NPO Solid Status: > 8 hours       Airway   Mallampati: I  TM distance: >3 FB  Neck ROM: full  Dental      Pulmonary    (+) sleep apnea on CPAP,   (-) asthma, not a smoker  Cardiovascular   Exercise tolerance: good (4-7 METS)    ECG reviewed    (+) hypertension,   (-) pacemaker, past MI, angina, cardiac stents      Neuro/Psych  (-) seizures, CVA  GI/Hepatic/Renal/Endo    (+) obesity,    (-) GERD, liver disease, no renal disease, diabetes    Musculoskeletal     Abdominal    Substance History      OB/GYN          Other                                        Anesthesia Plan    ASA 2     general     intravenous induction   Anesthetic plan and risks discussed with patient.

## 2017-11-14 NOTE — PLAN OF CARE
Problem: Patient Care Overview (Adult)  Goal: Plan of Care Review  Outcome: Ongoing (interventions implemented as appropriate)    11/14/17 1614   Coping/Psychosocial Response Interventions   Plan Of Care Reviewed With patient   Patient Care Overview   Progress no change         Problem: Perioperative Period (Adult)  Goal: Signs and Symptoms of Listed Potential Problems Will be Absent or Manageable (Perioperative Period)  Outcome: Ongoing (interventions implemented as appropriate)

## 2017-11-15 NOTE — ANESTHESIA POSTPROCEDURE EVALUATION
Patient: Ken Stone    Procedure Summary     Date Anesthesia Start Anesthesia Stop Room / Location    11/14/17 [id number]  [facility] OR 01 / [facility] OR       Procedure Diagnosis Surgeon Provider    URETEROSCOPY LASER LITHOTRIPSY WITH STENT INSERTION (Right Ureter) Right ureteral stone  (Right ureteral stone [N20.1]) MD Nathanael Lebron CRNA          Anesthesia Type: general  Last vitals  BP   (!) 204/92 (11/14/17 1947)   Temp   97.2 °F (36.2 °C) (11/14/17 1855)   Pulse   69 (11/14/17 1947)   Resp   16 (11/14/17 1947)     SpO2   93 % (11/14/17 1947)     Post Anesthesia Care and Evaluation    Patient location during evaluation: PACU  Patient participation: complete - patient participated  Level of consciousness: awake and alert  Pain management: adequate  Airway patency: patent  Anesthetic complications: No anesthetic complications    Cardiovascular status: acceptable  Respiratory status: acceptable  Hydration status: acceptable

## 2017-11-15 NOTE — OP NOTE
URETEROSCOPY LASER LITHOTRIPSY WITH STENT INSERTION  Procedure Note    Ken Stone  11/14/2017    Pre-op Diagnosis:   Right ureteral stone [N20.1]    Post-op Diagnosis:     Post-Op Diagnosis Codes:     * Right ureteral stone [N20.1]    Procedure/CPT® Codes:      Procedure(s):  URETEROSCOPY LASER LITHOTRIPSY WITH STENT INSERTION    Surgeon(s):  Ken Headley MD    Anesthesia: General    Staff:   Circulator: Heidi Vance RN; Jorje Ambrocio RN  Scrub Person: Estuardo Zendejas; Tiffany Edmondson    Estimated Blood Loss: none    Specimens:                None      Drains:           Findings: 8mm stone retropulsed into mid kidney; no other stone noted, Randalls Plaque in lower pole, all fragments passable    Complications: none    Indications: 61-year-old male with right-sided flank pain CT scan showed 8 x 5 mm obstructing stone in the proximal ureter options were discussed he opted for ureteroscopic management.  In addition, he had a 3 mm stone in his kidney.    Description of Procedure: After informed consent was obtained, the patient brought to the operating room where he underwent general endotracheal anesthesia in the supine position.  He was converted to the dorsal lithotomy position.  He was prepped and draped in the usual sterile fashion.  Timeout was done to ensure the correct patient, procedure and site.  He did receive preoperative antibiotics in the holding area.    22 Belarusian Storz endoscope inserted urethra straight fashion anterior urethra was normal posterior urethra showed mild bilobar hyperplasia the prostate.  The bladder was entered and drained there was no lesions noted.  The right ureteral orifice was identified and cannulated with 0.38 sensor tip wire.  I then placed a 10 Belarusian dual-lumen catheter.  I placed a 0.38 sensor tip wire through this.  I then removed the dual-lumen catheter and placed a 11, 13 Belarusian 28 cm ureteral access sheath.  I then placed the scope over the wire and ended  up retropulsed in the stone into the kidney.  I then used a 200 µ fiber on settings 0.3 J and 80 Hz dust the stone into small fragments.  I then inspected the remaining calyces 0 per pole calyx was negative for any stone.  The lower pole calyx did show a Ronny's plaque.  I did not see any other stone that was seen on previous CAT scan.  I then inspected the ureter its entirety on way out and saw no evidence of stone disease.  Follow-up fragments were passable.  And I did not grab a piece because they were so small.  I then removed the ureteral access sheath.  I placed a 10 Cameroonian dual lumen catheter over the wire instilled 10 mL dilute contrast outlining the clotting system.  There was no extravasation or filling defects noted.  Full dictation will be below.  I then placed the cystoscope over the wire and placed a 626 stent with a good curl seen in the renal pelvis and good curl seen distally in the bladder.  The bladder was drained scope was removed the string was secured to the patient the patient was wakened anesthesia and transferred current satisfactory condition.    Right retrograde pyelogram read: 10 Cameroonian dual-lumen catheter was placed over pre-soup a 0.38 sensor tip wire and 10 mL dilute contrast used Danial the collecting system ureter was normal course and caliber.  There was some dilation of the renal pelvis and moderate dilation and blunting of the calyces was no extravasation or filling defects noted.    Ken Headely MD     Date: 11/14/2017  Time: 6:24 PM

## 2017-11-15 NOTE — DISCHARGE INSTRUCTIONS

## 2017-11-15 NOTE — PLAN OF CARE
Problem: Patient Care Overview (Adult)  Goal: Plan of Care Review  Outcome: Ongoing (interventions implemented as appropriate)    11/14/17 1522   Coping/Psychosocial Response Interventions   Plan Of Care Reviewed With patient   Patient Care Overview   Progress improving   Outcome Evaluation   Outcome Summary/Follow up Plan Meets PAcu d/c criteria         Problem: Perioperative Period (Adult)  Goal: Signs and Symptoms of Listed Potential Problems Will be Absent or Manageable (Perioperative Period)  Outcome: Ongoing (interventions implemented as appropriate)

## 2017-11-15 NOTE — PLAN OF CARE
Problem: Patient Care Overview (Adult)  Goal: Plan of Care Review  Outcome: Outcome(s) achieved Date Met:  11/14/17 11/14/17 2015   Coping/Psychosocial Response Interventions   Plan Of Care Reviewed With patient;spouse   Patient Care Overview   Progress improving   Outcome Evaluation   Outcome Summary/Follow up Plan PT AMBULATING, VOIDING, VSS MEETS DISCHARGE CRITERIA         Problem: Perioperative Period (Adult)  Goal: Signs and Symptoms of Listed Potential Problems Will be Absent or Manageable (Perioperative Period)  Outcome: Outcome(s) achieved Date Met:  11/14/17 11/14/17 2015   Perioperative Period   Problems Assessed (Perioperative Period) all   Problems Present (Perioperative Period) none

## 2017-11-17 ENCOUNTER — PROCEDURE VISIT (OUTPATIENT)
Dept: UROLOGY | Facility: CLINIC | Age: 62
End: 2017-11-17

## 2017-11-17 DIAGNOSIS — N20.1 RIGHT URETERAL STONE: Primary | ICD-10-CM

## 2017-11-17 PROCEDURE — 99211 OFF/OP EST MAY X REQ PHY/QHP: CPT | Performed by: UROLOGY

## 2017-11-17 NOTE — PROGRESS NOTES
Patient of Dr. Headley states he is here today to get his stent removed SP Ureteroscopy Laser Litho with stent placement on 11/14/2017. Patient denies any fever, chills, N&V or hematuria. The tape was removed and using the string stent was pulled with no complications. The patient was advised to continue any medications prescribed in the hospital and to call if he has any questions or concerns. The patient verbalized understanding. Follow up with Dr. Headley in 6 weeks with Renal US prior. Dr. Cheng was in the office for this procedure.

## 2017-11-18 ENCOUNTER — HOSPITAL ENCOUNTER (INPATIENT)
Facility: HOSPITAL | Age: 62
LOS: 1 days | Discharge: HOME | End: 2017-11-19
Attending: EMERGENCY MEDICINE | Admitting: UROLOGY

## 2017-11-18 ENCOUNTER — APPOINTMENT (OUTPATIENT)
Dept: CT IMAGING | Facility: HOSPITAL | Age: 62
End: 2017-11-18

## 2017-11-18 DIAGNOSIS — N20.1 RIGHT URETERAL STONE: Primary | ICD-10-CM

## 2017-11-18 DIAGNOSIS — N39.0 ACUTE UTI: ICD-10-CM

## 2017-11-18 DIAGNOSIS — N39.0 URINARY TRACT INFECTION WITHOUT HEMATURIA, SITE UNSPECIFIED: ICD-10-CM

## 2017-11-18 PROBLEM — R10.9 FLANK PAIN: Status: ACTIVE | Noted: 2017-11-18

## 2017-11-18 PROBLEM — N13.30 HYDRONEPHROSIS: Status: ACTIVE | Noted: 2017-11-18

## 2017-11-18 LAB
ALBUMIN SERPL-MCNC: 3.8 G/DL (ref 3.5–5)
ALBUMIN/GLOB SERPL: 1.3 G/DL (ref 1.1–2.5)
ALP SERPL-CCNC: 84 U/L (ref 24–120)
ALT SERPL W P-5'-P-CCNC: 31 U/L (ref 0–54)
ANION GAP SERPL CALCULATED.3IONS-SCNC: 11 MMOL/L (ref 4–13)
AST SERPL-CCNC: 29 U/L (ref 7–45)
BACTERIA UR QL AUTO: ABNORMAL /HPF
BASOPHILS # BLD AUTO: 0.04 10*3/MM3 (ref 0–0.2)
BASOPHILS NFR BLD AUTO: 0.3 % (ref 0–2)
BILIRUB SERPL-MCNC: 0.6 MG/DL (ref 0.1–1)
BILIRUB UR QL STRIP: NEGATIVE
BUN BLD-MCNC: 16 MG/DL (ref 5–21)
BUN/CREAT SERPL: 15.4 (ref 7–25)
CALCIUM SPEC-SCNC: 8.9 MG/DL (ref 8.4–10.4)
CHLORIDE SERPL-SCNC: 104 MMOL/L (ref 98–110)
CLARITY UR: CLEAR
CO2 SERPL-SCNC: 26 MMOL/L (ref 24–31)
COLOR UR: ABNORMAL
CREAT BLD-MCNC: 1.04 MG/DL (ref 0.5–1.4)
DEPRECATED RDW RBC AUTO: 41.3 FL (ref 40–54)
EOSINOPHIL # BLD AUTO: 0.06 10*3/MM3 (ref 0–0.7)
EOSINOPHIL NFR BLD AUTO: 0.4 % (ref 0–4)
ERYTHROCYTE [DISTWIDTH] IN BLOOD BY AUTOMATED COUNT: 13.5 % (ref 12–15)
GFR SERPL CREATININE-BSD FRML MDRD: 72 ML/MIN/1.73
GLOBULIN UR ELPH-MCNC: 3 GM/DL
GLUCOSE BLD-MCNC: 115 MG/DL (ref 70–100)
GLUCOSE UR STRIP-MCNC: NEGATIVE MG/DL
HCT VFR BLD AUTO: 41.7 % (ref 40–52)
HGB BLD-MCNC: 14.1 G/DL (ref 14–18)
HGB UR QL STRIP.AUTO: ABNORMAL
HOLD SPECIMEN: NORMAL
HOLD SPECIMEN: NORMAL
HYALINE CASTS UR QL AUTO: ABNORMAL /LPF
IMM GRANULOCYTES # BLD: 0.05 10*3/MM3 (ref 0–0.03)
IMM GRANULOCYTES NFR BLD: 0.4 % (ref 0–5)
KETONES UR QL STRIP: NEGATIVE
LEUKOCYTE ESTERASE UR QL STRIP.AUTO: ABNORMAL
LYMPHOCYTES # BLD AUTO: 1.28 10*3/MM3 (ref 0.72–4.86)
LYMPHOCYTES NFR BLD AUTO: 9.1 % (ref 15–45)
MCH RBC QN AUTO: 28.5 PG (ref 28–32)
MCHC RBC AUTO-ENTMCNC: 33.8 G/DL (ref 33–36)
MCV RBC AUTO: 84.4 FL (ref 82–95)
MONOCYTES # BLD AUTO: 1.25 10*3/MM3 (ref 0.19–1.3)
MONOCYTES NFR BLD AUTO: 8.9 % (ref 4–12)
NEUTROPHILS # BLD AUTO: 11.36 10*3/MM3 (ref 1.87–8.4)
NEUTROPHILS NFR BLD AUTO: 80.9 % (ref 39–78)
NITRITE UR QL STRIP: POSITIVE
PH UR STRIP.AUTO: 6.5 [PH] (ref 5–8)
PLATELET # BLD AUTO: 210 10*3/MM3 (ref 130–400)
PMV BLD AUTO: 10.3 FL (ref 6–12)
POTASSIUM BLD-SCNC: 4.5 MMOL/L (ref 3.5–5.3)
PROT SERPL-MCNC: 6.8 G/DL (ref 6.3–8.7)
PROT UR QL STRIP: ABNORMAL
RBC # BLD AUTO: 4.94 10*6/MM3 (ref 4.8–5.9)
RBC # UR: ABNORMAL /HPF
REF LAB TEST METHOD: ABNORMAL
SODIUM BLD-SCNC: 141 MMOL/L (ref 135–145)
SP GR UR STRIP: 1.01 (ref 1–1.03)
SQUAMOUS #/AREA URNS HPF: ABNORMAL /HPF
UROBILINOGEN UR QL STRIP: ABNORMAL
WBC NRBC COR # BLD: 14.04 10*3/MM3 (ref 4.8–10.8)
WBC UR QL AUTO: ABNORMAL /HPF
WHOLE BLOOD HOLD SPECIMEN: NORMAL
WHOLE BLOOD HOLD SPECIMEN: NORMAL
YEAST URNS QL MICRO: ABNORMAL /HPF

## 2017-11-18 PROCEDURE — 25010000002 CEFTRIAXONE PER 250 MG: Performed by: UROLOGY

## 2017-11-18 PROCEDURE — 25810000003 SODIUM CHLORIDE 0.9 % WITH KCL 20 MEQ 20-0.9 MEQ/L-% SOLUTION: Performed by: UROLOGY

## 2017-11-18 PROCEDURE — 85025 COMPLETE CBC W/AUTO DIFF WBC: CPT | Performed by: EMERGENCY MEDICINE

## 2017-11-18 PROCEDURE — 74176 CT ABD & PELVIS W/O CONTRAST: CPT

## 2017-11-18 PROCEDURE — 25010000002 KETOROLAC TROMETHAMINE PER 15 MG: Performed by: UROLOGY

## 2017-11-18 PROCEDURE — 81001 URINALYSIS AUTO W/SCOPE: CPT | Performed by: EMERGENCY MEDICINE

## 2017-11-18 PROCEDURE — 87040 BLOOD CULTURE FOR BACTERIA: CPT | Performed by: UROLOGY

## 2017-11-18 PROCEDURE — 99284 EMERGENCY DEPT VISIT MOD MDM: CPT

## 2017-11-18 PROCEDURE — 87086 URINE CULTURE/COLONY COUNT: CPT | Performed by: EMERGENCY MEDICINE

## 2017-11-18 PROCEDURE — 99222 1ST HOSP IP/OBS MODERATE 55: CPT | Performed by: UROLOGY

## 2017-11-18 PROCEDURE — 80053 COMPREHEN METABOLIC PANEL: CPT | Performed by: EMERGENCY MEDICINE

## 2017-11-18 RX ORDER — TAMSULOSIN HYDROCHLORIDE 0.4 MG/1
0.4 CAPSULE ORAL NIGHTLY
Status: DISCONTINUED | OUTPATIENT
Start: 2017-11-18 | End: 2017-11-19 | Stop reason: HOSPADM

## 2017-11-18 RX ORDER — AMLODIPINE BESYLATE 5 MG/1
5 TABLET ORAL NIGHTLY
COMMUNITY
End: 2020-06-19

## 2017-11-18 RX ORDER — SODIUM CHLORIDE 0.9 % (FLUSH) 0.9 %
1-10 SYRINGE (ML) INJECTION AS NEEDED
Status: DISCONTINUED | OUTPATIENT
Start: 2017-11-18 | End: 2017-11-19 | Stop reason: HOSPADM

## 2017-11-18 RX ORDER — CALCIUM CARBONATE 200(500)MG
2 TABLET,CHEWABLE ORAL 2 TIMES DAILY PRN
Status: DISCONTINUED | OUTPATIENT
Start: 2017-11-18 | End: 2017-11-19 | Stop reason: HOSPADM

## 2017-11-18 RX ORDER — LOSARTAN POTASSIUM 50 MG/1
100 TABLET ORAL EVERY EVENING
Status: DISCONTINUED | OUTPATIENT
Start: 2017-11-18 | End: 2017-11-19 | Stop reason: HOSPADM

## 2017-11-18 RX ORDER — LOSARTAN POTASSIUM 100 MG/1
100 TABLET ORAL DAILY
COMMUNITY

## 2017-11-18 RX ORDER — ONDANSETRON 2 MG/ML
4 INJECTION INTRAMUSCULAR; INTRAVENOUS EVERY 6 HOURS PRN
Status: DISCONTINUED | OUTPATIENT
Start: 2017-11-18 | End: 2017-11-19 | Stop reason: HOSPADM

## 2017-11-18 RX ORDER — MORPHINE SULFATE 2 MG/ML
2 INJECTION, SOLUTION INTRAMUSCULAR; INTRAVENOUS
Status: DISCONTINUED | OUTPATIENT
Start: 2017-11-18 | End: 2017-11-19 | Stop reason: HOSPADM

## 2017-11-18 RX ORDER — SODIUM CHLORIDE AND POTASSIUM CHLORIDE 150; 900 MG/100ML; MG/100ML
125 INJECTION, SOLUTION INTRAVENOUS CONTINUOUS
Status: DISCONTINUED | OUTPATIENT
Start: 2017-11-18 | End: 2017-11-19 | Stop reason: HOSPADM

## 2017-11-18 RX ORDER — KETOROLAC TROMETHAMINE 15 MG/ML
15 INJECTION, SOLUTION INTRAMUSCULAR; INTRAVENOUS EVERY 6 HOURS PRN
Status: DISCONTINUED | OUTPATIENT
Start: 2017-11-18 | End: 2017-11-19 | Stop reason: HOSPADM

## 2017-11-18 RX ORDER — PHENAZOPYRIDINE HYDROCHLORIDE 100 MG/1
100 TABLET, FILM COATED ORAL 3 TIMES DAILY PRN
Status: DISCONTINUED | OUTPATIENT
Start: 2017-11-18 | End: 2017-11-19 | Stop reason: HOSPADM

## 2017-11-18 RX ORDER — SODIUM CHLORIDE 0.9 % (FLUSH) 0.9 %
10 SYRINGE (ML) INJECTION AS NEEDED
Status: DISCONTINUED | OUTPATIENT
Start: 2017-11-18 | End: 2017-11-19 | Stop reason: HOSPADM

## 2017-11-18 RX ORDER — MONTELUKAST SODIUM 10 MG/1
10 TABLET ORAL NIGHTLY
Status: DISCONTINUED | OUTPATIENT
Start: 2017-11-18 | End: 2017-11-18

## 2017-11-18 RX ORDER — NALOXONE HCL 0.4 MG/ML
0.4 VIAL (ML) INJECTION
Status: DISCONTINUED | OUTPATIENT
Start: 2017-11-18 | End: 2017-11-19 | Stop reason: HOSPADM

## 2017-11-18 RX ORDER — HYDROCODONE BITARTRATE AND ACETAMINOPHEN 5; 325 MG/1; MG/1
1 TABLET ORAL EVERY 4 HOURS PRN
Status: DISCONTINUED | OUTPATIENT
Start: 2017-11-18 | End: 2017-11-19 | Stop reason: HOSPADM

## 2017-11-18 RX ORDER — MONTELUKAST SODIUM 10 MG/1
10 TABLET ORAL NIGHTLY
Status: ON HOLD | COMMUNITY
End: 2017-11-18

## 2017-11-18 RX ORDER — AMLODIPINE BESYLATE 5 MG/1
5 TABLET ORAL EVERY MORNING
Status: DISCONTINUED | OUTPATIENT
Start: 2017-11-19 | End: 2017-11-19 | Stop reason: HOSPADM

## 2017-11-18 RX ADMIN — POTASSIUM CHLORIDE AND SODIUM CHLORIDE 125 ML/HR: 900; 150 INJECTION, SOLUTION INTRAVENOUS at 22:47

## 2017-11-18 RX ADMIN — HYDROCODONE BITARTRATE AND ACETAMINOPHEN 1 TABLET: 5; 325 TABLET ORAL at 19:52

## 2017-11-18 RX ADMIN — KETOROLAC TROMETHAMINE 15 MG: 15 INJECTION, SOLUTION INTRAMUSCULAR; INTRAVENOUS at 22:05

## 2017-11-18 RX ADMIN — POTASSIUM CHLORIDE AND SODIUM CHLORIDE 125 ML/HR: 900; 150 INJECTION, SOLUTION INTRAVENOUS at 15:21

## 2017-11-18 RX ADMIN — CEFTRIAXONE SODIUM 1 G: 1 INJECTION, POWDER, FOR SOLUTION INTRAMUSCULAR; INTRAVENOUS at 13:45

## 2017-11-18 RX ADMIN — LOSARTAN POTASSIUM 100 MG: 50 TABLET ORAL at 16:48

## 2017-11-18 NOTE — ED PROVIDER NOTES
Subjective patient is a 61-year-old male who presents to the ER with right flank pain and fever.  Patient was diagnosed with a right ureteral stone on November 14.  Patient had stone removal and stent placed on November 14 by Dr. Headley.  Patient states he was feeling better and had his stent removed yesterday.  Since that time, patient has had right flank pain, progressively worsening.  Patient describes the pain as achy in nature.  Patient states he had a low-grade fever this morning at 100.3 and called his urologist who told him to come to the ER for evaluation.  Patient denies any chest pain, shortness of breath, nausea vomiting diarrhea, dysuria, neurological changes.    History provided by:  Patient and spouse   used: No        Review of Systems   Constitutional: Positive for fever.   HENT: Negative.    Eyes: Negative.    Respiratory: Negative.    Cardiovascular: Negative.    Gastrointestinal: Negative.    Endocrine: Negative.    Genitourinary: Positive for flank pain.   Skin: Negative.    Allergic/Immunologic: Negative.    Neurological: Negative.    Hematological: Negative.    Psychiatric/Behavioral: Negative.    All other systems reviewed and are negative.      Past Medical History:   Diagnosis Date   • Arthritis    • Hyperlipidemia    • Hypertension    • Joint pain    • Kidney stones    • Sinus infection 11/13/2017   • Sleep apnea with use of continuous positive airway pressure (CPAP)        No Known Allergies    Past Surgical History:   Procedure Laterality Date   • KNEE ARTHROSCOPY MENISCUS TRANSPLANT Left    • URETEROSCOPY LASER LITHOTRIPSY WITH STENT INSERTION Right 11/14/2017    Procedure: URETEROSCOPY LASER LITHOTRIPSY WITH STENT INSERTION;  Surgeon: Ken Headley MD;  Location: [facility];  Service:        Family History   Problem Relation Age of Onset   • Hypertension Father        Social History     Social History   • Marital status:      Spouse name: N/A   • Number  of children: N/A   • Years of education: N/A     Social History Main Topics   • Smoking status: Former Smoker     Types: Cigarettes   • Smokeless tobacco: Never Used   • Alcohol use Yes      Comment: occ.   • Drug use: No   • Sexual activity: Defer     Other Topics Concern   • None     Social History Narrative           Objective   Physical Exam   Constitutional: He is oriented to person, place, and time. He appears well-developed and well-nourished.   HENT:   Head: Normocephalic and atraumatic.   Eyes: Conjunctivae are normal. Pupils are equal, round, and reactive to light.   Neck: Normal range of motion.   Cardiovascular: Normal rate, regular rhythm and normal heart sounds.    Pulmonary/Chest: Effort normal and breath sounds normal.   Abdominal: Soft. There is no tenderness. There is no rigidity, no rebound, no guarding and no CVA tenderness.   Musculoskeletal: Normal range of motion. He exhibits no edema or deformity.   Neurological: He is alert and oriented to person, place, and time. He has normal strength.   Skin: Skin is warm.   Psychiatric: He has a normal mood and affect. His behavior is normal.   Nursing note and vitals reviewed.      Procedures         ED Course  ED Course      Lab Results (last 24 hours)     Procedure Component Value Units Date/Time    CBC & Differential [[id number]] Collected:  11/18/17 1116    Specimen:  Blood Updated:  11/18/17 1124    Narrative:       The following orders were created for panel order CBC & Differential.  Procedure                               Abnormality         Status                     ---------                               -----------         ------                     CBC Auto Differential[[id number]]        Abnormal            Final result                 Please view results for these tests on the individual orders.    Comprehensive Metabolic Panel [[id number]]  (Abnormal) Collected:  11/18/17 1116    Specimen:  Blood Updated:  11/18/17 1136     Glucose 115 (H)  mg/dL      BUN 16 mg/dL      Creatinine 1.04 mg/dL      Sodium 141 mmol/L      Potassium 4.5 mmol/L      Chloride 104 mmol/L      CO2 26.0 mmol/L      Calcium 8.9 mg/dL      Total Protein 6.8 g/dL      Albumin 3.80 g/dL      ALT (SGPT) 31 U/L      AST (SGOT) 29 U/L      Alkaline Phosphatase 84 U/L      Total Bilirubin 0.6 mg/dL      eGFR Non African Amer 72 mL/min/1.73      Globulin 3.0 gm/dL      A/G Ratio 1.3 g/dL      BUN/Creatinine Ratio 15.4     Anion Gap 11.0 mmol/L     CBC Auto Differential [[id number]]  (Abnormal) Collected:  11/18/17 1116    Specimen:  Blood Updated:  11/18/17 1124     WBC 14.04 (H) 10*3/mm3      RBC 4.94 10*6/mm3      Hemoglobin 14.1 g/dL      Hematocrit 41.7 %      MCV 84.4 fL      MCH 28.5 pg      MCHC 33.8 g/dL      RDW 13.5 %      RDW-SD 41.3 fl      MPV 10.3 fL      Platelets 210 10*3/mm3      Neutrophil % 80.9 (H) %      Lymphocyte % 9.1 (L) %      Monocyte % 8.9 %      Eosinophil % 0.4 %      Basophil % 0.3 %      Immature Grans % 0.4 %      Neutrophils, Absolute 11.36 (H) 10*3/mm3      Lymphocytes, Absolute 1.28 10*3/mm3      Monocytes, Absolute 1.25 10*3/mm3      Eosinophils, Absolute 0.06 10*3/mm3      Basophils, Absolute 0.04 10*3/mm3      Immature Grans, Absolute 0.05 (H) 10*3/mm3     Urinalysis With / Culture If Indicated - Urine, Clean Catch [[id number]]  (Abnormal) Collected:  11/18/17 1210    Specimen:  Urine from Urine, Clean Catch Updated:  11/18/17 1252     Color, UA Dark Yellow (A)     Appearance, UA Clear     pH, UA 6.5     Specific Gravity, UA 1.014     Glucose, UA Negative     Ketones, UA Negative     Bilirubin, UA Negative     Blood, UA Large (3+) (A)     Protein, UA Trace (A)     Leuk Esterase, UA Moderate (2+) (A)     Nitrite, UA Positive (A)     Urobilinogen, UA 1.0 E.U./dL    Urine Culture - Urine, Urine, Clean Catch [[id number]] Collected:  11/18/17 1210    Specimen:  Urine from Urine, Clean Catch Updated:  11/18/17 1213    Urinalysis, Microscopic Only - Urine,  Clean Catch [[id number]]  (Abnormal) Collected:  11/18/17 1210    Specimen:  Urine from Urine, Clean Catch Updated:  11/18/17 1252     RBC, UA Too Numerous to Count (A) /HPF      WBC, UA 13-20 (A) /HPF      Bacteria, UA Trace (A) /HPF      Squamous Epithelial Cells, UA 0-2 /HPF      Yeast, UA Small/1+ Yeast /HPF      Hyaline Casts, UA None Seen /LPF      Methodology Automated Microscopy        CT Abdomen Pelvis Stone Protocol   Final Result   1. Moderate right-sided pelvocaliectasis and hydroureter with 2 or 3   closely positioned stones measuring approximately 2 mm stones in the   proximal right ureter, described above in detail.   This report was finalized on 11/18/2017 12:14 by Dr. Thee Leon MD.        Labs were obtained and patient was given Rocephin.  Labs showed a urinary tract infection and leukocytosis.  CT scan showed 2-3 ureteral stones in the proximal right ureter with hydro-ureter.  Discussed the case with Dr. Cheng with urology.  He will admit the patient to his service for operative intervention tomorrow.          MDM  Number of Diagnoses or Management Options      Final diagnoses:   Right ureteral stone   Acute UTI            Tatum Livingston MD  11/18/17 0819

## 2017-11-18 NOTE — PLAN OF CARE
Problem: Patient Care Overview (Adult)  Goal: Plan of Care Review    11/18/17 8065   Outcome Evaluation   Outcome Summary/Follow up Plan Voiding per urinal and straining urine. **

## 2017-11-18 NOTE — PLAN OF CARE
Problem: Patient Care Overview (Adult)  Goal: Plan of Care Review  Outcome: Ongoing (interventions implemented as appropriate)    11/18/17 4978   Coping/Psychosocial Response Interventions   Plan Of Care Reviewed With patient   Patient Care Overview   Progress no change   Outcome Evaluation   Outcome Summary/Follow up Plan Pt came from ER with ureter stone. Pt c/o of moderate pain but is refusing pain meds when offered this far in shift. Voiding per urinal and straining urine. Up ad [place]. IVF started. NPO after midnight. Will cont to monitor.       Goal: Adult Individualization and Mutuality  Outcome: Ongoing (interventions implemented as appropriate)  Goal: Discharge Needs Assessment  Outcome: Ongoing (interventions implemented as appropriate)    Problem: Pain, Acute (Adult)  Goal: Identify Related Risk Factors and Signs and Symptoms  Outcome: Ongoing (interventions implemented as appropriate)  Goal: Acceptable Pain Control/Comfort Level  Outcome: Ongoing (interventions implemented as appropriate)

## 2017-11-18 NOTE — PLAN OF CARE
Problem: Patient Care Overview (Adult)  Goal: Plan of Care Review    11/18/17 4525   Coping/Psychosocial Response Interventions   Plan Of Care Reviewed With patient   Patient Care Overview   Progress no change   Outcome Evaluation   Outcome Summary/Follow up Plan Pt came from ER with ureter stone. Pt c/o of moderate pain but is refusing pain meds when offered this far in shift. Voiding per [place]. Up ad [place]. IVF started. NPO after midnight. Will cont to monitor.       Goal: Adult Individualization and Mutuality  Outcome: Ongoing (interventions implemented as appropriate)  Goal: Discharge Needs Assessment  Outcome: Ongoing (interventions implemented as appropriate)    Problem: Pain, Acute (Adult)  Goal: Identify Related Risk Factors and Signs and Symptoms  Outcome: Ongoing (interventions implemented as appropriate)  Goal: Acceptable Pain Control/Comfort Level  Outcome: Ongoing (interventions implemented as appropriate)

## 2017-11-19 VITALS
DIASTOLIC BLOOD PRESSURE: 89 MMHG | HEIGHT: 72 IN | HEART RATE: 68 BPM | BODY MASS INDEX: 37.59 KG/M2 | SYSTOLIC BLOOD PRESSURE: 162 MMHG | TEMPERATURE: 98.8 F | WEIGHT: 277.5 LBS | OXYGEN SATURATION: 95 % | RESPIRATION RATE: 16 BRPM

## 2017-11-19 LAB
ALBUMIN SERPL-MCNC: 3.5 G/DL (ref 3.5–5)
ALBUMIN/GLOB SERPL: 1.3 G/DL (ref 1.1–2.5)
ALP SERPL-CCNC: 67 U/L (ref 24–120)
ALT SERPL W P-5'-P-CCNC: 32 U/L (ref 0–54)
ANION GAP SERPL CALCULATED.3IONS-SCNC: 8 MMOL/L (ref 4–13)
AST SERPL-CCNC: 26 U/L (ref 7–45)
BILIRUB SERPL-MCNC: 0.7 MG/DL (ref 0.1–1)
BUN BLD-MCNC: 18 MG/DL (ref 5–21)
BUN/CREAT SERPL: 12.3 (ref 7–25)
CALCIUM SPEC-SCNC: 8.1 MG/DL (ref 8.4–10.4)
CHLORIDE SERPL-SCNC: 105 MMOL/L (ref 98–110)
CO2 SERPL-SCNC: 26 MMOL/L (ref 24–31)
CREAT BLD-MCNC: 1.46 MG/DL (ref 0.5–1.4)
DEPRECATED RDW RBC AUTO: 44 FL (ref 40–54)
ERYTHROCYTE [DISTWIDTH] IN BLOOD BY AUTOMATED COUNT: 14 % (ref 12–15)
GFR SERPL CREATININE-BSD FRML MDRD: 49 ML/MIN/1.73
GLOBULIN UR ELPH-MCNC: 2.8 GM/DL
GLUCOSE BLD-MCNC: 116 MG/DL (ref 70–100)
HCT VFR BLD AUTO: 40.4 % (ref 40–52)
HGB BLD-MCNC: 13.2 G/DL (ref 14–18)
MCH RBC QN AUTO: 28.3 PG (ref 28–32)
MCHC RBC AUTO-ENTMCNC: 32.7 G/DL (ref 33–36)
MCV RBC AUTO: 86.7 FL (ref 82–95)
PLATELET # BLD AUTO: 198 10*3/MM3 (ref 130–400)
PMV BLD AUTO: 10.4 FL (ref 6–12)
POTASSIUM BLD-SCNC: 4.8 MMOL/L (ref 3.5–5.3)
PROT SERPL-MCNC: 6.3 G/DL (ref 6.3–8.7)
RBC # BLD AUTO: 4.66 10*6/MM3 (ref 4.8–5.9)
SODIUM BLD-SCNC: 139 MMOL/L (ref 135–145)
WBC NRBC COR # BLD: 9.32 10*3/MM3 (ref 4.8–10.8)

## 2017-11-19 PROCEDURE — 99238 HOSP IP/OBS DSCHRG MGMT 30/<: CPT | Performed by: UROLOGY

## 2017-11-19 PROCEDURE — 25810000003 SODIUM CHLORIDE 0.9 % WITH KCL 20 MEQ 20-0.9 MEQ/L-% SOLUTION: Performed by: UROLOGY

## 2017-11-19 PROCEDURE — 80053 COMPREHEN METABOLIC PANEL: CPT | Performed by: UROLOGY

## 2017-11-19 PROCEDURE — 85027 COMPLETE CBC AUTOMATED: CPT | Performed by: UROLOGY

## 2017-11-19 RX ORDER — LEVOFLOXACIN 500 MG/1
500 TABLET, FILM COATED ORAL DAILY
Qty: 3 TABLET | Refills: 0 | Status: SHIPPED | OUTPATIENT
Start: 2017-11-19 | End: 2017-11-22

## 2017-11-19 RX ADMIN — POTASSIUM CHLORIDE AND SODIUM CHLORIDE 125 ML/HR: 900; 150 INJECTION, SOLUTION INTRAVENOUS at 06:29

## 2017-11-19 RX ADMIN — HYDROCODONE BITARTRATE AND ACETAMINOPHEN 1 TABLET: 5; 325 TABLET ORAL at 07:36

## 2017-11-19 RX ADMIN — AMLODIPINE BESYLATE 5 MG: 5 TABLET ORAL at 07:36

## 2017-11-19 NOTE — H&P
Mr. Edmondson is 61 y.o. male    CHIEF COMPLAINT: Flank pain and fevers status post ureteroscopy    Flank Pain   This is a new problem. The current episode started yesterday. The problem occurs intermittently. The problem has been gradually improving since onset. Pain location: Right flank. The pain is moderate. The pain is the same all the time. The symptoms are aggravated by position. Associated symptoms include a fever. Pertinent negatives include no chest pain or dysuria. Risk factors: Recent ureteroscopy. He has tried analgesics for the symptoms. The treatment provided mild relief.   Fever    This is a new problem. The current episode started yesterday. The problem occurs intermittently. The problem has been waxing and waning. The maximum temperature noted was 100 to 100.9 F. The temperature was taken using an oral thermometer. Pertinent negatives include no chest pain, coughing, diarrhea, muscle aches, nausea, rash, sore throat, urinary pain or vomiting. He has tried nothing for the symptoms. The treatment provided no relief.   Risk factors: no recent sickness and no sick contacts        The following portions of the patient's history were reviewed and updated as appropriate: allergies, current medications, past family history, past medical history, past social history, past surgical history and problem list.    Review of Systems   Constitutional: Positive for chills and fever. Negative for appetite change.   HENT: Negative for hearing loss and sore throat.    Eyes: Negative for pain and redness.   Respiratory: Negative for cough and shortness of breath.    Cardiovascular: Negative for chest pain and leg swelling.   Gastrointestinal: Negative for anal bleeding, diarrhea, nausea and vomiting.   Endocrine: Negative for cold intolerance and heat intolerance.   Genitourinary: Positive for flank pain. Negative for dysuria and hematuria.   Musculoskeletal: Negative for joint swelling and myalgias.   Skin: Negative for  color change and rash.   Allergic/Immunologic: Negative for immunocompromised state.   Neurological: Negative for dizziness and speech difficulty.   Hematological: Negative for adenopathy. Does not bruise/bleed easily.   Psychiatric/Behavioral: Negative for dysphoric mood and suicidal ideas.         Current Facility-Administered Medications:   •  [START ON 11/19/2017] amLODIPine (NORVASC) tablet 5 mg, 5 mg, Oral, QAM, Claudio Cheng MD  •  calcium carbonate (TUMS) chewable tablet 500 mg (200 mg elemental), 2 tablet, Oral, BID PRN, Claudio Cheng MD  •  HYDROcodone-acetaminophen (NORCO) 5-325 MG per tablet 1 tablet, 1 tablet, Oral, Q4H PRN, Claudio Cheng MD, 1 tablet at 11/18/17 1952  •  ketorolac (TORADOL) injection 15 mg, 15 mg, Intravenous, Q6H PRN, Claudio Cheng MD  •  losartan (COZAAR) tablet 100 mg, 100 mg, Oral, Q PM, Claudio Cheng MD, 100 mg at 11/18/17 1648  •  Morphine sulfate (PF) injection 2 mg, 2 mg, Intravenous, Q3H PRN **AND** naloxone (NARCAN) injection 0.4 mg, 0.4 mg, Intravenous, Q5 Min PRN, Claudio Cheng MD  •  ondansetron (ZOFRAN) injection 4 mg, 4 mg, Intravenous, Q6H PRN, Claudio Cheng MD  •  phenazopyridine (PYRIDIUM) tablet 100 mg, 100 mg, Oral, TID PRN, Claudio Cheng MD  •  sodium chloride 0.9 % flush 1-10 mL, 1-10 mL, Intravenous, PRN, Claudio Cheng MD  •  Insert peripheral IV, , , Once **AND** sodium chloride 0.9 % flush 10 mL, 10 mL, Intravenous, PRN, Tatum Livingston MD  •  sodium chloride 0.9 % with KCl 20 mEq/L infusion, 125 mL/hr, Intravenous, Continuous, Claudio Cheng MD, Last Rate: 125 mL/hr at 11/18/17 1521, 125 mL/hr at 11/18/17 1521  •  tamsulosin (FLOMAX) 24 hr capsule 0.4 mg, 0.4 mg, Oral, Nightly, Claudio Cheng MD    No Known Allergies    Past Medical History:   Diagnosis Date   • Arthritis    • Hyperlipidemia    • Hypertension    • Joint pain    • Kidney stones    • Sinus infection 11/13/2017   • Sleep apnea with  "use of continuous positive airway pressure (CPAP)        Past Surgical History:   Procedure Laterality Date   • KNEE ARTHROSCOPY MENISCUS TRANSPLANT Left    • URETEROSCOPY LASER LITHOTRIPSY WITH STENT INSERTION Right 11/14/2017    Procedure: URETEROSCOPY LASER LITHOTRIPSY WITH STENT INSERTION;  Surgeon: Ken Headley MD;  Location: [facility] OR;  Service:        Social History     Social History   • Marital status:      Spouse name: N/A   • Number of children: N/A   • Years of education: N/A     Social History Main Topics   • Smoking status: Former Smoker     Types: Cigarettes   • Smokeless tobacco: Never Used   • Alcohol use Yes      Comment: occ.   • Drug use: No   • Sexual activity: Defer     Other Topics Concern   • None     Social History Narrative       Family History   Problem Relation Age of Onset   • Hypertension Father          /81 (BP Location: Left arm, Patient Position: Lying) Comment: nurse notified  Pulse 78  Temp 100.3 °F (37.9 °C) (Oral)  Comment: Dr. Cheng at bedside  Resp 16  Ht 72\" (182.9 cm)  Wt 277 lb 8 oz (126 kg)  SpO2 96%  BMI 37.64 kg/m2    Physical Exam   Constitutional: He is oriented to person, place, and time. He appears well-developed and well-nourished. No distress.   HENT:   Head: Normocephalic and atraumatic.   Right Ear: External ear and ear canal normal.   Left Ear: External ear and ear canal normal.   Nose: No nasal deformity. No epistaxis.   Mouth/Throat: Oropharynx is clear and moist. Mucous membranes are not pale, not dry and not cyanotic. Normal dentition. No oropharyngeal exudate.   Neck: Trachea normal. No tracheal tenderness present. No tracheal deviation present. No thyroid mass and no thyromegaly present.   Pulmonary/Chest: Effort normal. No accessory muscle usage. No respiratory distress. Chest wall is not dull to percussion (No flatness or hyperresonance). He exhibits no mass and no tenderness.   On palpation, no tactile fremitus. All movements " are symmetric. No intercostal retraction noted.    Abdominal: Soft. Normal appearance. He exhibits no distension and no mass. There is no hepatosplenomegaly. There is no tenderness. No hernia.   Rectal examination or stool specimen is not indicated.    Musculoskeletal:   Normal gait and station. The spine, ribs, and pelvis are examined. No obvious misalignment or asymmetry. ROM is reasonable for age. No instability. No obvious atrophy, flaccidity or spasticity.    Lymphadenopathy:     He has no cervical adenopathy.        Right: No inguinal adenopathy present.        Left: No inguinal adenopathy present.   Neurological: He is alert and oriented to person, place, and time.   Skin: Skin is warm, dry and intact. No lesion and no rash noted. He is not diaphoretic. No cyanosis. No pallor. Nails show no clubbing.   On palpation, there were no induration, subcutaneous nodules, or tightening   Psychiatric: His speech is normal and behavior is normal. Judgment and thought content normal. His mood appears not anxious. His affect is not labile. He does not exhibit a depressed mood.   Vitals reviewed.      Lab Results (last 72 hours)     Procedure Component Value Units Date/Time    CBC & Differential [[id number]] Collected:  11/18/17 1116    Specimen:  Blood Updated:  11/18/17 1124    Narrative:       The following orders were created for panel order CBC & Differential.  Procedure                               Abnormality         Status                     ---------                               -----------         ------                     CBC Auto Differential[[id number]]        Abnormal            Final result                 Please view results for these tests on the individual orders.    CBC Auto Differential [[id number]]  (Abnormal) Collected:  11/18/17 1116    Specimen:  Blood Updated:  11/18/17 1124     WBC 14.04 (H) 10*3/mm3      RBC 4.94 10*6/mm3      Hemoglobin 14.1 g/dL      Hematocrit 41.7 %      MCV 84.4 fL      MCH  28.5 pg      MCHC 33.8 g/dL      RDW 13.5 %      RDW-SD 41.3 fl      MPV 10.3 fL      Platelets 210 10*3/mm3      Neutrophil % 80.9 (H) %      Lymphocyte % 9.1 (L) %      Monocyte % 8.9 %      Eosinophil % 0.4 %      Basophil % 0.3 %      Immature Grans % 0.4 %      Neutrophils, Absolute 11.36 (H) 10*3/mm3      Lymphocytes, Absolute 1.28 10*3/mm3      Monocytes, Absolute 1.25 10*3/mm3      Eosinophils, Absolute 0.06 10*3/mm3      Basophils, Absolute 0.04 10*3/mm3      Immature Grans, Absolute 0.05 (H) 10*3/mm3     Comprehensive Metabolic Panel [[id number]]  (Abnormal) Collected:  11/18/17 1116    Specimen:  Blood Updated:  11/18/17 1136     Glucose 115 (H) mg/dL      BUN 16 mg/dL      Creatinine 1.04 mg/dL      Sodium 141 mmol/L      Potassium 4.5 mmol/L      Chloride 104 mmol/L      CO2 26.0 mmol/L      Calcium 8.9 mg/dL      Total Protein 6.8 g/dL      Albumin 3.80 g/dL      ALT (SGPT) 31 U/L      AST (SGOT) 29 U/L      Alkaline Phosphatase 84 U/L      Total Bilirubin 0.6 mg/dL      eGFR Non African Amer 72 mL/min/1.73      Globulin 3.0 gm/dL      A/G Ratio 1.3 g/dL      BUN/Creatinine Ratio 15.4     Anion Gap 11.0 mmol/L     Urine Culture - Urine, Urine, Clean Catch [[id number]] Collected:  11/18/17 1210    Specimen:  Urine from Urine, Clean Catch Updated:  11/18/17 1213    Light Blue Top [[id number]] Collected:  11/18/17 1116    Specimen:  Blood Updated:  11/18/17 1231     Extra Tube hold for add-on      Auto resulted       [place] Draw [[id number]] Collected:  11/18/17 1116    Specimen:  Blood Updated:  11/18/17 1231    Narrative:       The following orders were created for panel order [place] Draw.  Procedure                               Abnormality         Status                     ---------                               -----------         ------                     Light Blue Top[[id number]]                                   Final result               Green Top (Gel)[[id number]]                                   Final result               Lavender Top[[id number]]                                     Final result               Red Top[[id number]]                                          Final result                 Please view results for these tests on the individual orders.    Green Top (Gel) [[id number]] Collected:  11/18/17 1116    Specimen:  Blood Updated:  11/18/17 1231     Extra Tube Hold for add-ons.      Auto resulted.       Lavender Top [[id number]] Collected:  11/18/17 1116    Specimen:  Blood Updated:  11/18/17 1231     Extra Tube hold for add-on      Auto resulted       Red Top [[id number]] Collected:  11/18/17 1116    Specimen:  Blood Updated:  11/18/17 1231     Extra Tube Hold for add-ons.      Auto resulted.       Urinalysis With / Culture If Indicated - Urine, Clean Catch [[id number]]  (Abnormal) Collected:  11/18/17 1210    Specimen:  Urine from Urine, Clean Catch Updated:  11/18/17 1252     Color, UA Dark Yellow (A)     Appearance, UA Clear     pH, UA 6.5     Specific Gravity, UA 1.014     Glucose, UA Negative     Ketones, UA Negative     Bilirubin, UA Negative     Blood, UA Large (3+) (A)     Protein, UA Trace (A)     Leuk Esterase, UA Moderate (2+) (A)     Nitrite, UA Positive (A)     Urobilinogen, UA 1.0 E.U./dL    Urinalysis, Microscopic Only - Urine, Clean Catch [[id number]]  (Abnormal) Collected:  11/18/17 1210    Specimen:  Urine from Urine, Clean Catch Updated:  11/18/17 1252     RBC, UA Too Numerous to Count (A) /HPF      WBC, UA 13-20 (A) /HPF      Bacteria, UA Trace (A) /HPF      Squamous Epithelial Cells, UA 0-2 /HPF      Yeast, UA Small/1+ Yeast /HPF      Hyaline Casts, UA None Seen /LPF      Methodology Automated Microscopy    Blood Culture - Blood, Blood, Venous Line [[id number]] Collected:  11/18/17 1339    Specimen:  Blood from Hand, Left Updated:  11/18/17 1437    Blood Culture - Blood, Blood, Venous Line [[id number]] Collected:  11/18/17 1340    Specimen:  Blood from Arm, Left Updated:  11/18/17  1438        Imaging Results (last 72 hours)     Procedure Component Value Units Date/Time    CT Abdomen Pelvis Stone Protocol [[id number]] Collected:  11/18/17 1207     Updated:  11/18/17 1218    Narrative:       EXAMINATION: CT ABDOMEN PELVIS STONE PROTOCOL-  11/18/2017 12:07 PM CST     HISTORY: Urinary tract calculi. History of recent right ureteral stent  removal. Right-sided flank pain     COMPARISON:None     TECHNIQUE:Radiation dose equals  mGy-cm.  Automated exposure  control dose reduction technique was implemented.     Thin section axial images were obtained without intravenous or oral  contrast. 2-D sagittal and coronal reconstruction images were generated.     FINDINGS:     In the proximal right ureter there are 2 or 3 small ureteral stones  identified measuring approximately 2 mm in diameter closely position.  There is mild to moderate right-sided pelvocaliectasis and mild  hydroureter with mild induration of the periureteral fat. There are no  additional ureteral stones appreciated. The proximal stones appear  nonobstructing based on the ureteral size.     Additionally there are multiple nonobstructing centrally located calculi  in right kidney, largest measuring approximately 6 mm.     There are small 2 mm nonobstructing calculus appreciated centrally in  the left kidney. There is no left-sided ureterolithiasis or obstructive  uropathy change.     Urinary bladder is decompressed without focal bladder wall abnormality.  The prostate gland is not significantly enlarged.     Calcified granuloma noted in the left lung base.     There is no CT evidence of gallstones.     Calcified granuloma noted in the spleen.     There are no adrenal masses.     There is mild fatty infiltration of the pancreas.     Stool and gas identified throughout the colon which is not dilated.  Minimal diverticulosis in the sigmoid colon observed. The appendix is  not inflamed. Appendicolith in the appendix most likely.      Small bowel is not dilated. The duodenal sweep imaged appropriately. The  stomach is not distended.     There is mild atherosclerotic aortoiliac calcifications.     There are small para-aortic and mesenteric lymph nodes. There are no  pathologically enlarged nodes.     There is a fat-containing, uncomplicated umbilical hernia.     Multilevel disc degeneration spondylosis appreciated in the lumbar and  thoracic spine.       Impression:       1. Moderate right-sided pelvocaliectasis and hydroureter with 2 or 3  closely positioned stones measuring approximately 2 mm stones in the  proximal right ureter, described above in detail.  This report was finalized on 11/18/2017 12:14 by Dr. Thee Leon MD.          Assessment and Plan    Active Problems:    Right ureteral stone    Flank pain    Urinary tract infection    Hydronephrosis      I independently reviewed his CT scan.  Multiple calcifications within the kidney.  Per review of Dr. Headley's nodes.  These are Ronny's plaques.  He does have mild pelviectasis with a mildly dilated ureter down to the level of the bladder.  Within the proximal ureter there are 2 small 1-2 mm stones.  Urinalysis nitrite and leukoesterase positive.  White blood cell count 14.  Creatinine 1.04    At this point, I do not think these small stones are not obstructing.  These are most likely small fragments that will pass spontaneously.  He does have symmetric dilation of the ureter throughout the proximal mid and distal, consistent with recent ureteral stent.  There is no increased hydro-ureter or hydronephrosis proximal to the stones.  I do believe that the flank pain that he experienced was likely a ureteral spasm and is not uncommon after removal of a ureteral stent.  I think the mild hydronephrosis/pelviectasis with dilated ureter is a normal finding post ureteral stenting.    With regards to his low-grade temperature and possible urinary tract infection, I discussed with him that is  somewhat hard to determine if he does have an infection at this time or not.  He does have blood and leukocytes in his urine, which again would be common after a ureteroscopy with stent.  In addition his urine is nitrite positive which typically would indicate a possible infection, but he is on Pyridium which can create a false positive.    At this point I believe the best course of action is admission to the hospital with urine and blood cultures with starting of broad-spectrum IV antibiotics with Rocephin.  IV hydration.  Pain control with Toradol and IV morphine, along with oral medications if needed.    At this point there is no need for surgical intervention with a ureteral stent.  I would like to monitor him for fevers as well as monitor his vitals.  If he has worsening signs of infection and I will plan on a right ureteral stent and he understands this.  Otherwise I do believe he will likely improve over the next 24 hours.    CT independent review  The CT scan of the abdomen/pelvis done without contrast is available for me to review.  Treatment recommendations require an independent review.  First I scanned the liver, spleen, and bowel pattern.  The retroperitoneum including the major vessels and lymphatic packages are briefly reviewed.  This film as been reviewed by the radiologist to determine any non urologic abnormalities that are present.  The kidneys are closely inspected for size, symmetry, contour, parenchymal thickness, perinephric reaction, presence of calcifications, and intrarenal dilation of the collecting system.  The ureters are inspected for their course, caliber, and any calcifications.  The bladder is inspected for its thickness, size, and presence of any calcifications.  This scan shows:    The right kidney appears abnormal on this non contrasted CT scan.   Mild hydronephrosis with symmetric dilation of the ureter along the proximal mid and distal.  2 small, 1-2 mm stones in the proximal  ureter without significant worsening hydroureter proximal to this.  Multiple calcifications in the kidney    The left kidney appears normal on this non-contrasted CT scan.  The renal parenchymal is norml in thickness.  There are no solid masses or cysts.  There is no hydronephrosis.  There are no stones.      The bladder appears normal on this non-contrasted CT scan.  The bladder appears normal in thickness.  There no masses or stones seen on this exam.

## 2017-11-19 NOTE — PROGRESS NOTES
LOS: 1 day   Patient Care Team:  Marcos Walls MD as PCP - General (Family Medicine)  Ken Headley MD as Consulting Physician (Urology)    Chief Complaint:  I feel better today    Subjective     Interval History:     Patient Complaints: none  Patient Denies:  Fevers or pain  History taken from: patient    Review of Systems:   Review of Systems   Constitutional: Negative for chills and fever.   Gastrointestinal: Negative for nausea and vomiting.   Genitourinary: Negative for flank pain.       Objective     Vital Signs  Temp:  [98.6 °F (37 °C)-100.3 °F (37.9 °C)] 99 °F (37.2 °C)  Heart Rate:  [66-85] 70  Resp:  [16-18] 16  BP: (140-178)/(81-89) 153/87    Physical Exam:  Physical Exam   Constitutional: No distress.   Pulmonary/Chest: Effort normal.   Skin: He is not diaphoretic.   Vitals reviewed.       Results Review:       Lab Results (last 72 hours)     Procedure Component Value Units Date/Time    CBC & Differential [[id number]] Collected:  11/18/17 1116    Specimen:  Blood Updated:  11/18/17 1124    Narrative:       The following orders were created for panel order CBC & Differential.  Procedure                               Abnormality         Status                     ---------                               -----------         ------                     CBC Auto Differential[[id number]]        Abnormal            Final result                 Please view results for these tests on the individual orders.    CBC Auto Differential [[id number]]  (Abnormal) Collected:  11/18/17 1116    Specimen:  Blood Updated:  11/18/17 1124     WBC 14.04 (H) 10*3/mm3      RBC 4.94 10*6/mm3      Hemoglobin 14.1 g/dL      Hematocrit 41.7 %      MCV 84.4 fL      MCH 28.5 pg      MCHC 33.8 g/dL      RDW 13.5 %      RDW-SD 41.3 fl      MPV 10.3 fL      Platelets 210 10*3/mm3      Neutrophil % 80.9 (H) %      Lymphocyte % 9.1 (L) %      Monocyte % 8.9 %      Eosinophil % 0.4 %      Basophil % 0.3 %      Immature Grans % 0.4  %      Neutrophils, Absolute 11.36 (H) 10*3/mm3      Lymphocytes, Absolute 1.28 10*3/mm3      Monocytes, Absolute 1.25 10*3/mm3      Eosinophils, Absolute 0.06 10*3/mm3      Basophils, Absolute 0.04 10*3/mm3      Immature Grans, Absolute 0.05 (H) 10*3/mm3     Comprehensive Metabolic Panel [[id number]]  (Abnormal) Collected:  11/18/17 1116    Specimen:  Blood Updated:  11/18/17 1136     Glucose 115 (H) mg/dL      BUN 16 mg/dL      Creatinine 1.04 mg/dL      Sodium 141 mmol/L      Potassium 4.5 mmol/L      Chloride 104 mmol/L      CO2 26.0 mmol/L      Calcium 8.9 mg/dL      Total Protein 6.8 g/dL      Albumin 3.80 g/dL      ALT (SGPT) 31 U/L      AST (SGOT) 29 U/L      Alkaline Phosphatase 84 U/L      Total Bilirubin 0.6 mg/dL      eGFR Non African Amer 72 mL/min/1.73      Globulin 3.0 gm/dL      A/G Ratio 1.3 g/dL      BUN/Creatinine Ratio 15.4     Anion Gap 11.0 mmol/L     Light Blue Top [[id number]] Collected:  11/18/17 1116    Specimen:  Blood Updated:  11/18/17 1231     Extra Tube hold for add-on      Auto resulted       [place] Draw [[id number]] Collected:  11/18/17 1116    Specimen:  Blood Updated:  11/18/17 1231    Narrative:       The following orders were created for panel order [place] Draw.  Procedure                               Abnormality         Status                     ---------                               -----------         ------                     Light Blue Top[[id number]]                                   Final result               Green Top (Gel)[[id number]]                                  Final result               Lavender Top[[id number]]                                     Final result               Red Top[[id number]]                                          Final result                 Please view results for these tests on the individual orders.    Green Top (Gel) [[id number]] Collected:  11/18/17 1116    Specimen:  Blood Updated:  11/18/17 1231     Extra Tube Hold for add-ons.      Auto  resulted.       Lavender Top [[id number]] Collected:  11/18/17 1116    Specimen:  Blood Updated:  11/18/17 1231     Extra Tube hold for add-on      Auto resulted       Red Top [[id number]] Collected:  11/18/17 1116    Specimen:  Blood Updated:  11/18/17 1231     Extra Tube Hold for add-ons.      Auto resulted.       Urinalysis With / Culture If Indicated - Urine, Clean Catch [[id number]]  (Abnormal) Collected:  11/18/17 1210    Specimen:  Urine from Urine, Clean Catch Updated:  11/18/17 1252     Color, UA Dark Yellow (A)     Appearance, UA Clear     pH, UA 6.5     Specific Gravity, UA 1.014     Glucose, UA Negative     Ketones, UA Negative     Bilirubin, UA Negative     Blood, UA Large (3+) (A)     Protein, UA Trace (A)     Leuk Esterase, UA Moderate (2+) (A)     Nitrite, UA Positive (A)     Urobilinogen, UA 1.0 E.U./dL    Urinalysis, Microscopic Only - Urine, Clean Catch [[id number]]  (Abnormal) Collected:  11/18/17 1210    Specimen:  Urine from Urine, Clean Catch Updated:  11/18/17 1252     RBC, UA Too Numerous to Count (A) /HPF      WBC, UA 13-20 (A) /HPF      Bacteria, UA Trace (A) /HPF      Squamous Epithelial Cells, UA 0-2 /HPF      Yeast, UA Small/1+ Yeast /HPF      Hyaline Casts, UA None Seen /LPF      Methodology Automated Microscopy    Blood Culture - Blood, Blood, Venous Line [[id number]]  (Normal) Collected:  11/18/17 1339    Specimen:  Blood from Hand, Left Updated:  11/19/17 0246     Blood Culture No growth at less than 24 hours    Blood Culture - Blood, Blood, Venous Line [[id number]]  (Normal) Collected:  11/18/17 1340    Specimen:  Blood from Arm, Left Updated:  11/19/17 0246     Blood Culture No growth at less than 24 hours    CBC (No Diff) [[id number]]  (Abnormal) Collected:  11/19/17 0444    Specimen:  Blood Updated:  11/19/17 0513     WBC 9.32 10*3/mm3      RBC 4.66 (L) 10*6/mm3      Hemoglobin 13.2 (L) g/dL      Hematocrit 40.4 %      MCV 86.7 fL      MCH 28.3 pg      MCHC 32.7 (L) g/dL      RDW 14.0  %      RDW-SD 44.0 fl      MPV 10.4 fL      Platelets 198 10*3/mm3     Comprehensive Metabolic Panel [[id number]]  (Abnormal) Collected:  11/19/17 0550    Specimen:  Blood Updated:  11/19/17 0644     Glucose 116 (H) mg/dL      BUN 18 mg/dL       Specimen hemolyzed. Results may be affected.        Creatinine 1.46 (H) mg/dL      Sodium 139 mmol/L      Potassium 4.8 mmol/L       Specimen hemolyzed.  Results may be affected.        Chloride 105 mmol/L      CO2 26.0 mmol/L      Calcium 8.1 (L) mg/dL      Total Protein 6.3 g/dL      Albumin 3.50 g/dL      ALT (SGPT) 32 U/L       Specimen hemolyzed.  Results may be affected.        AST (SGOT) 26 U/L       Specimen hemolyzed.  Results may be affected.        Alkaline Phosphatase 67 U/L       Specimen hemolyzed. Results may be affected.        Total Bilirubin 0.7 mg/dL      eGFR Non African Amer 49 (L) mL/min/1.73      Globulin 2.8 gm/dL      A/G Ratio 1.3 g/dL      BUN/Creatinine Ratio 12.3     Anion Gap 8.0 mmol/L     Urine Culture - Urine, Urine, Clean Catch [[id number]]  (Normal) Collected:  11/18/17 1210    Specimen:  Urine from Urine, Clean Catch Updated:  11/19/17 0809     Urine Culture No growth at 24 hours        Imaging Results (last 72 hours)     Procedure Component Value Units Date/Time    CT Abdomen Pelvis Stone Protocol [[id number]] Collected:  11/18/17 1207     Updated:  11/18/17 1218    Narrative:       EXAMINATION: CT ABDOMEN PELVIS STONE PROTOCOL-  11/18/2017 12:07 PM CST     HISTORY: Urinary tract calculi. History of recent right ureteral stent  removal. Right-sided flank pain     COMPARISON:None     TECHNIQUE:Radiation dose equals  mGy-cm.  Automated exposure  control dose reduction technique was implemented.     Thin section axial images were obtained without intravenous or oral  contrast. 2-D sagittal and coronal reconstruction images were generated.     FINDINGS:     In the proximal right ureter there are 2 or 3 small ureteral stones  identified  measuring approximately 2 mm in diameter closely position.  There is mild to moderate right-sided pelvocaliectasis and mild  hydroureter with mild induration of the periureteral fat. There are no  additional ureteral stones appreciated. The proximal stones appear  nonobstructing based on the ureteral size.     Additionally there are multiple nonobstructing centrally located calculi  in right kidney, largest measuring approximately 6 mm.     There are small 2 mm nonobstructing calculus appreciated centrally in  the left kidney. There is no left-sided ureterolithiasis or obstructive  uropathy change.     Urinary bladder is decompressed without focal bladder wall abnormality.  The prostate gland is not significantly enlarged.     Calcified granuloma noted in the left lung base.     There is no CT evidence of gallstones.     Calcified granuloma noted in the spleen.     There are no adrenal masses.     There is mild fatty infiltration of the pancreas.     Stool and gas identified throughout the colon which is not dilated.  Minimal diverticulosis in the sigmoid colon observed. The appendix is  not inflamed. Appendicolith in the appendix most likely.     Small bowel is not dilated. The duodenal sweep imaged appropriately. The  stomach is not distended.     There is mild atherosclerotic aortoiliac calcifications.     There are small para-aortic and mesenteric lymph nodes. There are no  pathologically enlarged nodes.     There is a fat-containing, uncomplicated umbilical hernia.     Multilevel disc degeneration spondylosis appreciated in the lumbar and  thoracic spine.       Impression:       1. Moderate right-sided pelvocaliectasis and hydroureter with 2 or 3  closely positioned stones measuring approximately 2 mm stones in the  proximal right ureter, described above in detail.  This report was finalized on 11/18/2017 12:14 by Dr. Thee Leon MD.          Medication Review:     Current Facility-Administered Medications:    •  amLODIPine (NORVASC) tablet 5 mg, 5 mg, Oral, QAM, Claudio Cheng MD, 5 mg at 11/19/17 0736  •  calcium carbonate (TUMS) chewable tablet 500 mg (200 mg elemental), 2 tablet, Oral, BID PRN, Claudio Cheng MD  •  HYDROcodone-acetaminophen (NORCO) 5-325 MG per tablet 1 tablet, 1 tablet, Oral, Q4H PRN, Claudio Cheng MD, 1 tablet at 11/19/17 0736  •  ketorolac (TORADOL) injection 15 mg, 15 mg, Intravenous, Q6H PRN, Claudio Cheng MD, 15 mg at 11/18/17 2205  •  losartan (COZAAR) tablet 100 mg, 100 mg, Oral, Q PM, Claudio Cheng MD, 100 mg at 11/18/17 1648  •  Morphine sulfate (PF) injection 2 mg, 2 mg, Intravenous, Q3H PRN **AND** naloxone (NARCAN) injection 0.4 mg, 0.4 mg, Intravenous, Q5 Min PRN, Claudio Cheng MD  •  ondansetron (ZOFRAN) injection 4 mg, 4 mg, Intravenous, Q6H PRN, Claudio Cheng MD  •  phenazopyridine (PYRIDIUM) tablet 100 mg, 100 mg, Oral, TID PRN, Claudio Cheng MD  •  sodium chloride 0.9 % flush 1-10 mL, 1-10 mL, Intravenous, PRN, Claudio Cheng MD  •  Insert peripheral IV, , , Once **AND** sodium chloride 0.9 % flush 10 mL, 10 mL, Intravenous, PRN, Tatum Livingston MD  •  sodium chloride 0.9 % with KCl 20 mEq/L infusion, 125 mL/hr, Intravenous, Continuous, Claudio Cheng MD, Last Rate: 125 mL/hr at 11/19/17 0629, 125 mL/hr at 11/19/17 0629  •  tamsulosin (FLOMAX) 24 hr capsule 0.4 mg, 0.4 mg, Oral, Nightly, Claudio Cheng MD    Assessment/Plan     Active Problems:    Right ureteral stone    Flank pain    Urinary tract infection    Hydronephrosis    Patient overall feels much better.  No fevers overnight.  His flank pain has resolved.  Urine culture preliminary negative.  At this point I am comfortable discharging the hospital.  I discussed with him that if he develops fevers uncontrolled flank pain nausea vomiting to please return or call my office.  He will follow back up with Dr. Headley.      Claudio Cheng MD  11/19/17  8:27  AM

## 2017-11-19 NOTE — PLAN OF CARE
Problem: Patient Care Overview (Adult)  Goal: Plan of Care Review  Outcome: Ongoing (interventions implemented as appropriate)    11/19/17 0225   Coping/Psychosocial Response Interventions   Plan Of Care Reviewed With patient;spouse   Patient Care Overview   Progress improving   Outcome Evaluation   Outcome Summary/Follow up Plan C/o right flank pain, medicated X2 as of 0225. Pt reported that Toradol worked very well. IVF. Voiding. Continuing to strain urine. Up ad [place]. VSS. Will continue to monitor.        Goal: Adult Individualization and Mutuality  Outcome: Ongoing (interventions implemented as appropriate)    11/19/17 0225   Individualization   Patient Specific Preferences Prefers door shut.    Patient Specific Goals Decreased pain.   Patient Specific Interventions Administer prn pain meds as prescribed.    Mutuality/Individual Preferences   What Anxieties, Fears or Concerns Do You Have About Your Health or Care? none identified.    What Questions Do You Have About Your Health or Care? none identified.    What Information Would Help Us Give You More Personalized Care? none identified.         Problem: Pain, Acute (Adult)  Goal: Identify Related Risk Factors and Signs and Symptoms  Outcome: Ongoing (interventions implemented as appropriate)    11/19/17 0225   Pain, Acute   Related Risk Factors (Acute Pain) (Previous surgical procedure)   Signs and Symptoms (Acute Pain) verbalization of pain descriptors       Goal: Acceptable Pain Control/Comfort Level  Outcome: Ongoing (interventions implemented as appropriate)    11/19/17 0225   Pain, Acute (Adult)   Acceptable Pain Control/Comfort Level making progress toward outcome

## 2017-11-19 NOTE — DISCHARGE SUMMARY
Date of Discharge:  11/19/2017    Discharge Diagnosis:   #1.  Flank pain  Right ureteral stone  Fever    Presenting Problem/History of Present Illness  Right ureteral stone [N20.1]     Hospital Course  Patient is a 61 y.o. male presented with low-grade fever, flank pain.  On CT scan he was found to have a possible small stone in the ureter as well as a questionable positive urinary tract infection.  Patient admitted for further workup.  I will review the CT scan these did not appear to be 2 very small stones in the proximal ureter that were likely nonobstructing.  Urine cultures were negative.  The patient's pain improved with no evidence of fevers this hospitalization.  I do believe this is likely spasms from removing the stent which is not uncommon after a procedure like this.  At the time of discharge patient was afebrile, denied any flank pain, urine and blood cultures were negative..      Procedures Performed         Consults:   Consults     No orders found for last 30 day(s).          Condition on Discharge:  Stable    Vital Signs  Temp:  [98.6 °F (37 °C)-100.3 °F (37.9 °C)] 99 °F (37.2 °C)  Heart Rate:  [66-85] 70  Resp:  [16-18] 16  BP: (140-178)/(81-89) 153/87      Discharge Disposition  Home or Self Care    Discharge Medications   Ken Edmondson   Home Medication Instructions MARKIE:[id number]    Printed on:11/19/17 0828   Medication Information                      amLODIPine (NORVASC) 5 MG tablet  Take 5 mg by mouth Every Night.             Coenzyme Q10 (CO Q 10 PO)  Take 1 tablet by mouth Daily.             Fexofenadine HCl (ALLEGRA PO)  Take 1 tablet by mouth Daily As Needed (allergies).             HYDROcodone-acetaminophen (NORCO) 5-325 MG per tablet  Take 1 tablet by mouth Every 6 (Six) Hours As Needed for Moderate Pain .             levoFLOXacin (LEVAQUIN) 500 MG tablet  Take 1 tablet by mouth Daily for 3 days.             losartan (COZAAR) 100 MG tablet  Take 100 mg by mouth Daily.              NAPROXEN PO  Take 440 mg by mouth 2 (Two) Times a Day As Needed (pain).             Omega-3 Fatty Acids (FISH OIL) 1000 MG capsule capsule  Take 1,000 mg by mouth Daily With Breakfast.             phenazopyridine (PYRIDIUM) 100 MG tablet  Take 1 tablet by mouth 3 (Three) Times a Day As Needed for bladder spasms.             tamsulosin (FLOMAX) 0.4 MG capsule 24 hr capsule  Take 1 capsule by mouth Every Night.                 Discharge Diet:   Diet Instructions     Advance Diet As Tolerated                     Activity at Discharge:   Activity Instructions     Activity as Tolerated                     Follow-up Appointments  Future Appointments  Date Time Provider Department Center   1/3/2018 1:50 PM Ken Headley MD [facility] None     Additional Instructions for the Follow-ups that You Need to Schedule     Discharge Follow-up with Specified Provider: Dr. Headley 6 weeks with renal utlrasound same day    As directed    To:  Dr. Headley 6 weeks with renal utlrasound same day                 Test Results Pending at Discharge   Order Current Status    Blood Culture - Blood, Blood, Venous Line Preliminary result    Blood Culture - Blood, Blood, Venous Line Preliminary result    Urine Culture - Urine, Urine, Clean Catch Preliminary result           Claudio Cheng MD  11/19/17  8:28 AM    Time: Discharge Less than 30 min

## 2017-11-20 DIAGNOSIS — N20.1 RIGHT URETERAL STONE: Primary | ICD-10-CM

## 2017-11-20 LAB — BACTERIA SPEC AEROBE CULT: NORMAL

## 2017-11-20 NOTE — PAYOR COMM NOTE
"Ken Edmondson (61 y.o. Male) [phone]  [facility]  behzad phone    Fax       Date of Birth Social Security Number Address Home Phone MRN    1955  [address] [place] [address] [phone] [id number]    [employer] Marital Status          Quaker        Admission Date Admission Type Admitting Provider Attending Provider Department, Room/Bed    11/18/17 Emergency Claudio Cheng MD  [facility] 3C, 372/1    Discharge Date Discharge Disposition Discharge Destination        11/19/2017 Home or Self Care             Attending Provider: (none)    Allergies:  No Known Allergies    Isolation:  None   Infection:  None   Code Status:  Prior    Ht:  72\" (182.9 cm)   Wt:  277 lb 8 oz (126 kg)    Admission Cmt:  None   Principal Problem:  None                Active Insurance as of 11/18/2017     Primary Coverage     Payor Plan Insurance Group Employer/Plan Group    [employer] BLUE [employer] BLUE [employer] PPO [id number]     Payor Plan Address Payor Plan Phone Number Effective From Effective To    [address] [phone] 1/1/2017     [place] [id number]       Subscriber Name Subscriber Birth Date Member ID       KEN EDMONDSON 1955 [id number]                 Emergency Contacts      (Rel.) Home Phone Work Phone Mobile Phone    Loreta Edmondson (Spouse) [phone] -- --               History & Physical      Claudio Cheng MD at 11/18/2017  8:02 PM          Mr. Edmondson is 61 y.o. male    CHIEF COMPLAINT: Flank pain and fevers status post ureteroscopy    Flank Pain   This is a new problem. The current episode started yesterday. The problem occurs intermittently. The problem has been gradually improving since onset. Pain location: Right flank. The pain is moderate. The pain is the same all the time. The symptoms are aggravated by position. Associated symptoms include a fever. Pertinent negatives include no chest pain or " dysuria. Risk factors: Recent ureteroscopy. He has tried analgesics for the symptoms. The treatment provided mild relief.   Fever    This is a new problem. The current episode started yesterday. The problem occurs intermittently. The problem has been waxing and waning. The maximum temperature noted was 100 to 100.9 F. The temperature was taken using an oral thermometer. Pertinent negatives include no chest pain, coughing, diarrhea, muscle aches, nausea, rash, sore throat, urinary pain or vomiting. He has tried nothing for the symptoms. The treatment provided no relief.   Risk factors: no recent sickness and no sick contacts        The following portions of the patient's history were reviewed and updated as appropriate: allergies, current medications, past family history, past medical history, past social history, past surgical history and problem list.    Review of Systems   Constitutional: Positive for chills and fever. Negative for appetite change.   HENT: Negative for hearing loss and sore throat.    Eyes: Negative for pain and redness.   Respiratory: Negative for cough and shortness of breath.    Cardiovascular: Negative for chest pain and leg swelling.   Gastrointestinal: Negative for anal bleeding, diarrhea, nausea and vomiting.   Endocrine: Negative for cold intolerance and heat intolerance.   Genitourinary: Positive for flank pain. Negative for dysuria and hematuria.   Musculoskeletal: Negative for joint swelling and myalgias.   Skin: Negative for color change and rash.   Allergic/Immunologic: Negative for immunocompromised state.   Neurological: Negative for dizziness and speech difficulty.   Hematological: Negative for adenopathy. Does not bruise/bleed easily.   Psychiatric/Behavioral: Negative for dysphoric mood and suicidal ideas.         Current Facility-Administered Medications:   •  [START ON 11/19/2017] amLODIPine (NORVASC) tablet 5 mg, 5 mg, Oral, Claudio ELLER MD  •  calcium carbonate  (TUMS) chewable tablet 500 mg (200 mg elemental), 2 tablet, Oral, BID PRN, Claudio Cheng MD  •  HYDROcodone-acetaminophen (NORCO) 5-325 MG per tablet 1 tablet, 1 tablet, Oral, Q4H PRN, Claudio Cheng MD, 1 tablet at 11/18/17 1952  •  ketorolac (TORADOL) injection 15 mg, 15 mg, Intravenous, Q6H PRN, Claudio Cheng MD  •  losartan (COZAAR) tablet 100 mg, 100 mg, Oral, Q PM, Claudio Cheng MD, 100 mg at 11/18/17 1648  •  Morphine sulfate (PF) injection 2 mg, 2 mg, Intravenous, Q3H PRN **AND** naloxone (NARCAN) injection 0.4 mg, 0.4 mg, Intravenous, Q5 Min PRN, Claudio Cheng MD  •  ondansetron (ZOFRAN) injection 4 mg, 4 mg, Intravenous, Q6H PRN, Claudio Cheng MD  •  phenazopyridine (PYRIDIUM) tablet 100 mg, 100 mg, Oral, TID PRN, Claudio Cheng MD  •  sodium chloride 0.9 % flush 1-10 mL, 1-10 mL, Intravenous, PRN, Claudio Cheng MD  •  Insert peripheral IV, , , Once **AND** sodium chloride 0.9 % flush 10 mL, 10 mL, Intravenous, PRN, Tatum Livingston MD  •  sodium chloride 0.9 % with KCl 20 mEq/L infusion, 125 mL/hr, Intravenous, Continuous, Claudio Cheng MD, Last Rate: 125 mL/hr at 11/18/17 1521, 125 mL/hr at 11/18/17 1521  •  tamsulosin (FLOMAX) 24 hr capsule 0.4 mg, 0.4 mg, Oral, Nightly, Claudio Cheng MD    No Known Allergies    Past Medical History:   Diagnosis Date   • Arthritis    • Hyperlipidemia    • Hypertension    • Joint pain    • Kidney stones    • Sinus infection 11/13/2017   • Sleep apnea with use of continuous positive airway pressure (CPAP)        Past Surgical History:   Procedure Laterality Date   • KNEE ARTHROSCOPY MENISCUS TRANSPLANT Left    • URETEROSCOPY LASER LITHOTRIPSY WITH STENT INSERTION Right 11/14/2017    Procedure: URETEROSCOPY LASER LITHOTRIPSY WITH STENT INSERTION;  Surgeon: Ken Headley MD;  Location: [facility];  Service:        Social History     Social History   • Marital status:      Spouse name: N/A   • Number of  "children: N/A   • Years of education: N/A     Social History Main Topics   • Smoking status: Former Smoker     Types: Cigarettes   • Smokeless tobacco: Never Used   • Alcohol use Yes      Comment: occ.   • Drug use: No   • Sexual activity: Defer     Other Topics Concern   • None     Social History Narrative       Family History   Problem Relation Age of Onset   • Hypertension Father          /81 (BP Location: Left arm, Patient Position: Lying) Comment: nurse notified  Pulse 78  Temp 100.3 °F (37.9 °C) (Oral)  Comment: Dr. Cheng at bedside  Resp 16  Ht 72\" (182.9 cm)  Wt 277 lb 8 oz (126 kg)  SpO2 96%  BMI 37.64 kg/m2    Physical Exam   Constitutional: He is oriented to person, place, and time. He appears well-developed and well-nourished. No distress.   HENT:   Head: Normocephalic and atraumatic.   Right Ear: External ear and ear canal normal.   Left Ear: External ear and ear canal normal.   Nose: No nasal deformity. No epistaxis.   Mouth/Throat: Oropharynx is clear and moist. Mucous membranes are not pale, not dry and not cyanotic. Normal dentition. No oropharyngeal exudate.   Neck: Trachea normal. No tracheal tenderness present. No tracheal deviation present. No thyroid mass and no thyromegaly present.   Pulmonary/Chest: Effort normal. No accessory muscle usage. No respiratory distress. Chest wall is not dull to percussion (No flatness or hyperresonance). He exhibits no mass and no tenderness.   On palpation, no tactile fremitus. All movements are symmetric. No intercostal retraction noted.    Abdominal: Soft. Normal appearance. He exhibits no distension and no mass. There is no hepatosplenomegaly. There is no tenderness. No hernia.   Rectal examination or stool specimen is not indicated.    Musculoskeletal:   Normal gait and station. The spine, ribs, and pelvis are examined. No obvious misalignment or asymmetry. ROM is reasonable for age. No instability. No obvious atrophy, flaccidity or spasticity.  "   Lymphadenopathy:     He has no cervical adenopathy.        Right: No inguinal adenopathy present.        Left: No inguinal adenopathy present.   Neurological: He is alert and oriented to person, place, and time.   Skin: Skin is warm, dry and intact. No lesion and no rash noted. He is not diaphoretic. No cyanosis. No pallor. Nails show no clubbing.   On palpation, there were no induration, subcutaneous nodules, or tightening   Psychiatric: His speech is normal and behavior is normal. Judgment and thought content normal. His mood appears not anxious. His affect is not labile. He does not exhibit a depressed mood.   Vitals reviewed.      Lab Results (last 72 hours)     Procedure Component Value Units Date/Time    CBC & Differential [[id number]] Collected:  11/18/17 1116    Specimen:  Blood Updated:  11/18/17 1124    Narrative:       The following orders were created for panel order CBC & Differential.  Procedure                               Abnormality         Status                     ---------                               -----------         ------                     CBC Auto Differential[[id number]]        Abnormal            Final result                 Please view results for these tests on the individual orders.    CBC Auto Differential [[id number]]  (Abnormal) Collected:  11/18/17 1116    Specimen:  Blood Updated:  11/18/17 1124     WBC 14.04 (H) 10*3/mm3      RBC 4.94 10*6/mm3      Hemoglobin 14.1 g/dL      Hematocrit 41.7 %      MCV 84.4 fL      MCH 28.5 pg      MCHC 33.8 g/dL      RDW 13.5 %      RDW-SD 41.3 fl      MPV 10.3 fL      Platelets 210 10*3/mm3      Neutrophil % 80.9 (H) %      Lymphocyte % 9.1 (L) %      Monocyte % 8.9 %      Eosinophil % 0.4 %      Basophil % 0.3 %      Immature Grans % 0.4 %      Neutrophils, Absolute 11.36 (H) 10*3/mm3      Lymphocytes, Absolute 1.28 10*3/mm3      Monocytes, Absolute 1.25 10*3/mm3      Eosinophils, Absolute 0.06 10*3/mm3      Basophils, Absolute 0.04  10*3/mm3      Immature Grans, Absolute 0.05 (H) 10*3/mm3     Comprehensive Metabolic Panel [[id number]]  (Abnormal) Collected:  11/18/17 1116    Specimen:  Blood Updated:  11/18/17 1136     Glucose 115 (H) mg/dL      BUN 16 mg/dL      Creatinine 1.04 mg/dL      Sodium 141 mmol/L      Potassium 4.5 mmol/L      Chloride 104 mmol/L      CO2 26.0 mmol/L      Calcium 8.9 mg/dL      Total Protein 6.8 g/dL      Albumin 3.80 g/dL      ALT (SGPT) 31 U/L      AST (SGOT) 29 U/L      Alkaline Phosphatase 84 U/L      Total Bilirubin 0.6 mg/dL      eGFR Non African Amer 72 mL/min/1.73      Globulin 3.0 gm/dL      A/G Ratio 1.3 g/dL      BUN/Creatinine Ratio 15.4     Anion Gap 11.0 mmol/L     Urine Culture - Urine, Urine, Clean Catch [[id number]] Collected:  11/18/17 1210    Specimen:  Urine from Urine, Clean Catch Updated:  11/18/17 1213    Light Blue Top [[id number]] Collected:  11/18/17 1116    Specimen:  Blood Updated:  11/18/17 1231     Extra Tube hold for add-on      Auto resulted       [place] Draw [[id number]] Collected:  11/18/17 1116    Specimen:  Blood Updated:  11/18/17 1231    Narrative:       The following orders were created for panel order [place] Draw.  Procedure                               Abnormality         Status                     ---------                               -----------         ------                     Light Blue Top[[id number]]                                   Final result               Green Top (Gel)[[id number]]                                  Final result               Lavender Top[[id number]]                                     Final result               Red Top[[id number]]                                          Final result                 Please view results for these tests on the individual orders.    Green Top (Gel) [[id number]] Collected:  11/18/17 1116    Specimen:  Blood Updated:  11/18/17 1231     Extra Tube Hold for add-ons.      Auto resulted.       Lavender Top [[id number]]  Collected:  11/18/17 1116    Specimen:  Blood Updated:  11/18/17 1231     Extra Tube hold for add-on      Auto resulted       Red Top [[id number]] Collected:  11/18/17 1116    Specimen:  Blood Updated:  11/18/17 1231     Extra Tube Hold for add-ons.      Auto resulted.       Urinalysis With / Culture If Indicated - Urine, Clean Catch [[id number]]  (Abnormal) Collected:  11/18/17 1210    Specimen:  Urine from Urine, Clean Catch Updated:  11/18/17 1252     Color, UA Dark Yellow (A)     Appearance, UA Clear     pH, UA 6.5     Specific Gravity, UA 1.014     Glucose, UA Negative     Ketones, UA Negative     Bilirubin, UA Negative     Blood, UA Large (3+) (A)     Protein, UA Trace (A)     Leuk Esterase, UA Moderate (2+) (A)     Nitrite, UA Positive (A)     Urobilinogen, UA 1.0 E.U./dL    Urinalysis, Microscopic Only - Urine, Clean Catch [[id number]]  (Abnormal) Collected:  11/18/17 1210    Specimen:  Urine from Urine, Clean Catch Updated:  11/18/17 1252     RBC, UA Too Numerous to Count (A) /HPF      WBC, UA 13-20 (A) /HPF      Bacteria, UA Trace (A) /HPF      Squamous Epithelial Cells, UA 0-2 /HPF      Yeast, UA Small/1+ Yeast /HPF      Hyaline Casts, UA None Seen /LPF      Methodology Automated Microscopy    Blood Culture - Blood, Blood, Venous Line [[id number]] Collected:  11/18/17 1339    Specimen:  Blood from Hand, Left Updated:  11/18/17 1437    Blood Culture - Blood, Blood, Venous Line [[id number]] Collected:  11/18/17 1340    Specimen:  Blood from Arm, Left Updated:  11/18/17 1438        Imaging Results (last 72 hours)     Procedure Component Value Units Date/Time    CT Abdomen Pelvis Stone Protocol [[id number]] Collected:  11/18/17 1207     Updated:  11/18/17 1218    Narrative:       EXAMINATION: CT ABDOMEN PELVIS STONE PROTOCOL-  11/18/2017 12:07 PM CST     HISTORY: Urinary tract calculi. History of recent right ureteral stent  removal. Right-sided flank pain     COMPARISON:None     TECHNIQUE:Radiation dose equals DLP  799 mGy-cm.  Automated exposure  control dose reduction technique was implemented.     Thin section axial images were obtained without intravenous or oral  contrast. 2-D sagittal and coronal reconstruction images were generated.     FINDINGS:     In the proximal right ureter there are 2 or 3 small ureteral stones  identified measuring approximately 2 mm in diameter closely position.  There is mild to moderate right-sided pelvocaliectasis and mild  hydroureter with mild induration of the periureteral fat. There are no  additional ureteral stones appreciated. The proximal stones appear  nonobstructing based on the ureteral size.     Additionally there are multiple nonobstructing centrally located calculi  in right kidney, largest measuring approximately 6 mm.     There are small 2 mm nonobstructing calculus appreciated centrally in  the left kidney. There is no left-sided ureterolithiasis or obstructive  uropathy change.     Urinary bladder is decompressed without focal bladder wall abnormality.  The prostate gland is not significantly enlarged.     Calcified granuloma noted in the left lung base.     There is no CT evidence of gallstones.     Calcified granuloma noted in the spleen.     There are no adrenal masses.     There is mild fatty infiltration of the pancreas.     Stool and gas identified throughout the colon which is not dilated.  Minimal diverticulosis in the sigmoid colon observed. The appendix is  not inflamed. Appendicolith in the appendix most likely.     Small bowel is not dilated. The duodenal sweep imaged appropriately. The  stomach is not distended.     There is mild atherosclerotic aortoiliac calcifications.     There are small para-aortic and mesenteric lymph nodes. There are no  pathologically enlarged nodes.     There is a fat-containing, uncomplicated umbilical hernia.     Multilevel disc degeneration spondylosis appreciated in the lumbar and  thoracic spine.       Impression:       1. Moderate  right-sided pelvocaliectasis and hydroureter with 2 or 3  closely positioned stones measuring approximately 2 mm stones in the  proximal right ureter, described above in detail.  This report was finalized on 11/18/2017 12:14 by Dr. Thee Leon MD.          Assessment and Plan    Active Problems:    Right ureteral stone    Flank pain    Urinary tract infection    Hydronephrosis      I independently reviewed his CT scan.  Multiple calcifications within the kidney.  Per review of Dr. Headley's nodes.  These are Ronny's plaques.  He does have mild pelviectasis with a mildly dilated ureter down to the level of the bladder.  Within the proximal ureter there are 2 small 1-2 mm stones.  Urinalysis nitrite and leukoesterase positive.  White blood cell count 14.  Creatinine 1.04    At this point, I do not think these small stones are not obstructing.  These are most likely small fragments that will pass spontaneously.  He does have symmetric dilation of the ureter throughout the proximal mid and distal, consistent with recent ureteral stent.  There is no increased hydro-ureter or hydronephrosis proximal to the stones.  I do believe that the flank pain that he experienced was likely a ureteral spasm and is not uncommon after removal of a ureteral stent.  I think the mild hydronephrosis/pelviectasis with dilated ureter is a normal finding post ureteral stenting.    With regards to his low-grade temperature and possible urinary tract infection, I discussed with him that is somewhat hard to determine if he does have an infection at this time or not.  He does have blood and leukocytes in his urine, which again would be common after a ureteroscopy with stent.  In addition his urine is nitrite positive which typically would indicate a possible infection, but he is on Pyridium which can create a false positive.    At this point I believe the best course of action is admission to the hospital with urine and blood cultures with  starting of broad-spectrum IV antibiotics with Rocephin.  IV hydration.  Pain control with Toradol and IV morphine, along with oral medications if needed.    At this point there is no need for surgical intervention with a ureteral stent.  I would like to monitor him for fevers as well as monitor his vitals.  If he has worsening signs of infection and I will plan on a right ureteral stent and he understands this.  Otherwise I do believe he will likely improve over the next 24 hours.    CT independent review  The CT scan of the abdomen/pelvis done without contrast is available for me to review.  Treatment recommendations require an independent review.  First I scanned the liver, spleen, and bowel pattern.  The retroperitoneum including the major vessels and lymphatic packages are briefly reviewed.  This film as been reviewed by the radiologist to determine any non urologic abnormalities that are present.  The kidneys are closely inspected for size, symmetry, contour, parenchymal thickness, perinephric reaction, presence of calcifications, and intrarenal dilation of the collecting system.  The ureters are inspected for their course, caliber, and any calcifications.  The bladder is inspected for its thickness, size, and presence of any calcifications.  This scan shows:    The right kidney appears abnormal on this non contrasted CT scan.   Mild hydronephrosis with symmetric dilation of the ureter along the proximal mid and distal.  2 small, 1-2 mm stones in the proximal ureter without significant worsening hydroureter proximal to this.  Multiple calcifications in the kidney    The left kidney appears normal on this non-contrasted CT scan.  The renal parenchymal is norml in thickness.  There are no solid masses or cysts.  There is no hydronephrosis.  There are no stones.      The bladder appears normal on this non-contrasted CT scan.  The bladder appears normal in thickness.  There no masses or stones seen on this  exam.       Electronically signed by Claudio Cheng MD at 11/18/2017  8:11 PM           Emergency Department Notes      Tatum Livingston MD at 11/18/2017 10:47 AM          Subjective patient is a 61-year-old male who presents to the ER with right flank pain and fever.  Patient was diagnosed with a right ureteral stone on November 14.  Patient had stone removal and stent placed on November 14 by Dr. Headley.  Patient states he was feeling better and had his stent removed yesterday.  Since that time, patient has had right flank pain, progressively worsening.  Patient describes the pain as achy in nature.  Patient states he had a low-grade fever this morning at 100.3 and called his urologist who told him to come to the ER for evaluation.  Patient denies any chest pain, shortness of breath, nausea vomiting diarrhea, dysuria, neurological changes.    History provided by:  Patient and spouse   used: No        Review of Systems   Constitutional: Positive for fever.   HENT: Negative.    Eyes: Negative.    Respiratory: Negative.    Cardiovascular: Negative.    Gastrointestinal: Negative.    Endocrine: Negative.    Genitourinary: Positive for flank pain.   Skin: Negative.    Allergic/Immunologic: Negative.    Neurological: Negative.    Hematological: Negative.    Psychiatric/Behavioral: Negative.    All other systems reviewed and are negative.      Past Medical History:   Diagnosis Date   • Arthritis    • Hyperlipidemia    • Hypertension    • Joint pain    • Kidney stones    • Sinus infection 11/13/2017   • Sleep apnea with use of continuous positive airway pressure (CPAP)        No Known Allergies    Past Surgical History:   Procedure Laterality Date   • KNEE ARTHROSCOPY MENISCUS TRANSPLANT Left    • URETEROSCOPY LASER LITHOTRIPSY WITH STENT INSERTION Right 11/14/2017    Procedure: URETEROSCOPY LASER LITHOTRIPSY WITH STENT INSERTION;  Surgeon: Ken Headley MD;  Location: [facility];  Service:         Family History   Problem Relation Age of Onset   • Hypertension Father        Social History     Social History   • Marital status:      Spouse name: N/A   • Number of children: N/A   • Years of education: N/A     Social History Main Topics   • Smoking status: Former Smoker     Types: Cigarettes   • Smokeless tobacco: Never Used   • Alcohol use Yes      Comment: occ.   • Drug use: No   • Sexual activity: Defer     Other Topics Concern   • None     Social History Narrative           Objective   Physical Exam   Constitutional: He is oriented to person, place, and time. He appears well-developed and well-nourished.   HENT:   Head: Normocephalic and atraumatic.   Eyes: Conjunctivae are normal. Pupils are equal, round, and reactive to light.   Neck: Normal range of motion.   Cardiovascular: Normal rate, regular rhythm and normal heart sounds.    Pulmonary/Chest: Effort normal and breath sounds normal.   Abdominal: Soft. There is no tenderness. There is no rigidity, no rebound, no guarding and no CVA tenderness.   Musculoskeletal: Normal range of motion. He exhibits no edema or deformity.   Neurological: He is alert and oriented to person, place, and time. He has normal strength.   Skin: Skin is warm.   Psychiatric: He has a normal mood and affect. His behavior is normal.   Nursing note and vitals reviewed.      Procedures        ED Course  ED Course      Lab Results (last 24 hours)     Procedure Component Value Units Date/Time    CBC & Differential [[id number]] Collected:  11/18/17 1116    Specimen:  Blood Updated:  11/18/17 1124    Narrative:       The following orders were created for panel order CBC & Differential.  Procedure                               Abnormality         Status                     ---------                               -----------         ------                     CBC Auto Differential[[id number]]        Abnormal            Final result                 Please view results for these  tests on the individual orders.    Comprehensive Metabolic Panel [[id number]]  (Abnormal) Collected:  11/18/17 1116    Specimen:  Blood Updated:  11/18/17 1136     Glucose 115 (H) mg/dL      BUN 16 mg/dL      Creatinine 1.04 mg/dL      Sodium 141 mmol/L      Potassium 4.5 mmol/L      Chloride 104 mmol/L      CO2 26.0 mmol/L      Calcium 8.9 mg/dL      Total Protein 6.8 g/dL      Albumin 3.80 g/dL      ALT (SGPT) 31 U/L      AST (SGOT) 29 U/L      Alkaline Phosphatase 84 U/L      Total Bilirubin 0.6 mg/dL      eGFR Non African Amer 72 mL/min/1.73      Globulin 3.0 gm/dL      A/G Ratio 1.3 g/dL      BUN/Creatinine Ratio 15.4     Anion Gap 11.0 mmol/L     CBC Auto Differential [[id number]]  (Abnormal) Collected:  11/18/17 1116    Specimen:  Blood Updated:  11/18/17 1124     WBC 14.04 (H) 10*3/mm3      RBC 4.94 10*6/mm3      Hemoglobin 14.1 g/dL      Hematocrit 41.7 %      MCV 84.4 fL      MCH 28.5 pg      MCHC 33.8 g/dL      RDW 13.5 %      RDW-SD 41.3 fl      MPV 10.3 fL      Platelets 210 10*3/mm3      Neutrophil % 80.9 (H) %      Lymphocyte % 9.1 (L) %      Monocyte % 8.9 %      Eosinophil % 0.4 %      Basophil % 0.3 %      Immature Grans % 0.4 %      Neutrophils, Absolute 11.36 (H) 10*3/mm3      Lymphocytes, Absolute 1.28 10*3/mm3      Monocytes, Absolute 1.25 10*3/mm3      Eosinophils, Absolute 0.06 10*3/mm3      Basophils, Absolute 0.04 10*3/mm3      Immature Grans, Absolute 0.05 (H) 10*3/mm3     Urinalysis With / Culture If Indicated - Urine, Clean Catch [[id number]]  (Abnormal) Collected:  11/18/17 1210    Specimen:  Urine from Urine, Clean Catch Updated:  11/18/17 1252     Color, UA Dark Yellow (A)     Appearance, UA Clear     pH, UA 6.5     Specific Gravity, UA 1.014     Glucose, UA Negative     Ketones, UA Negative     Bilirubin, UA Negative     Blood, UA Large (3+) (A)     Protein, UA Trace (A)     Leuk Esterase, UA Moderate (2+) (A)     Nitrite, UA Positive (A)     Urobilinogen, UA 1.0 E.U./dL    Urine  Culture - Urine, Urine, Clean Catch [[id number]] Collected:  11/18/17 1210    Specimen:  Urine from Urine, Clean Catch Updated:  11/18/17 1213    Urinalysis, Microscopic Only - Urine, Clean Catch [[id number]]  (Abnormal) Collected:  11/18/17 1210    Specimen:  Urine from Urine, Clean Catch Updated:  11/18/17 1252     RBC, UA Too Numerous to Count (A) /HPF      WBC, UA 13-20 (A) /HPF      Bacteria, UA Trace (A) /HPF      Squamous Epithelial Cells, UA 0-2 /HPF      Yeast, UA Small/1+ Yeast /HPF      Hyaline Casts, UA None Seen /LPF      Methodology Automated Microscopy        CT Abdomen Pelvis Stone Protocol   Final Result   1. Moderate right-sided pelvocaliectasis and hydroureter with 2 or 3   closely positioned stones measuring approximately 2 mm stones in the   proximal right ureter, described above in detail.   This report was finalized on 11/18/2017 12:14 by Dr. Thee Leon MD.        Labs were obtained and patient was given Rocephin.  Labs showed a urinary tract infection and leukocytosis.  CT scan showed 2-3 ureteral stones in the proximal right ureter with hydro-ureter.  Discussed the case with Dr. Cheng with urology.  He will admit the patient to his service for operative intervention tomorrow.          MDM  Number of Diagnoses or Management Options      Final diagnoses:   Right ureteral stone   Acute UTI            Tatum Livingston MD  11/18/17 1332       Electronically signed by Tatum Livingston MD at 11/18/2017  1:32 PM        Hospital Medications (active)       Dose Frequency Start End    amLODIPine (NORVASC) tablet 5 mg (Discontinued) 5 mg Every Morning 11/19/2017 11/19/2017    Sig - Route: Take 1 tablet by mouth Every Morning. - Oral    Reason for Discontinue: Patient Discharge    calcium carbonate (TUMS) chewable tablet 500 mg (200 mg elemental) (Discontinued) 2 tablet 2 Times Daily PRN 11/18/2017 11/19/2017    Sig - Route: Chew 1,000 mg 2 (Two) Times a Day As Needed for Heartburn. - Oral    Reason for  "Discontinue: Patient Discharge    HYDROcodone-acetaminophen (NORCO) 5-325 MG per tablet 1 tablet (Discontinued) 1 tablet Every 4 Hours PRN 11/18/2017 11/19/2017    Sig - Route: Take 1 tablet by mouth Every 4 (Four) Hours As Needed for Moderate Pain . - Oral    Reason for Discontinue: Patient Discharge    ketorolac (TORADOL) injection 15 mg (Discontinued) 15 mg Every 6 Hours PRN 11/18/2017 11/19/2017    Sig - Route: Infuse 15 mg into a venous catheter Every 6 (Six) Hours As Needed for Moderate Pain  or Severe Pain . - Intravenous    Reason for Discontinue: Patient Discharge    losartan (COZAAR) tablet 100 mg (Discontinued) 100 mg Every Evening 11/18/2017 11/19/2017    Sig - Route: Take 2 tablets by mouth Every Evening. - Oral    Reason for Discontinue: Patient Discharge    Morphine sulfate (PF) injection 2 mg (Discontinued) 2 mg Every 3 Hours PRN 11/18/2017 11/19/2017    Sig - Route: Infuse 1 mL into a venous catheter Every 3 (Three) Hours As Needed for Moderate Pain . - Intravenous    Reason for Discontinue: Patient Discharge    Linked Group 1:  \"And\" Linked Group Details        naloxone (NARCAN) injection 0.4 mg (Discontinued) 0.4 mg Every 5 Minutes PRN 11/18/2017 11/19/2017    Sig - Route: Infuse 1 mL into a venous catheter Every 5 (Five) Minutes As Needed for Respiratory Depression. - Intravenous    Reason for Discontinue: Patient Discharge    Linked Group 1:  \"And\" Linked Group Details        ondansetron (ZOFRAN) injection 4 mg (Discontinued) 4 mg Every 6 Hours PRN 11/18/2017 11/19/2017    Sig - Route: Infuse 2 mL into a venous catheter Every 6 (Six) Hours As Needed for Nausea or Vomiting. - Intravenous    Reason for Discontinue: Patient Discharge    phenazopyridine (PYRIDIUM) tablet 100 mg (Discontinued) 100 mg 3 Times Daily PRN 11/18/2017 11/19/2017    Sig - Route: Take 1 tablet by mouth 3 (Three) Times a Day As Needed for bladder spasms. - Oral    Reason for Discontinue: Patient Discharge    sodium chloride " "0.9 % flush 1-10 mL (Discontinued) 1-10 mL As Needed 11/18/2017 11/19/2017    Sig - Route: Infuse 1-10 mL into a venous catheter As Needed for Line Care. - Intravenous    Reason for Discontinue: Patient Discharge    sodium chloride 0.9 % flush 10 mL (Discontinued) 10 mL As Needed 11/18/2017 11/19/2017    Sig - Route: Infuse 10 mL into a venous catheter As Needed for Line Care. - Intravenous    Reason for Discontinue: Patient Discharge    Linked Group 2:  \"And\" Linked Group Details        sodium chloride 0.9 % with KCl 20 mEq/L infusion (Discontinued) 125 mL/hr Continuous 11/18/2017 11/19/2017    Sig - Route: Infuse 125 mL/hr into a venous catheter Continuous. - Intravenous    Reason for Discontinue: Patient Discharge    tamsulosin (FLOMAX) 24 hr capsule 0.4 mg (Discontinued) 0.4 mg Nightly 11/18/2017 11/19/2017    Sig - Route: Take 1 capsule by mouth Every Night. - Oral    Reason for Discontinue: Patient Discharge             Physician Progress Notes (last 72 hours) (Notes from 11/17/2017 12:39 PM through 11/20/2017 12:39 PM)      Claudio Cheng MD at 11/19/2017  8:27 AM  Version 1 of 1              LOS: 1 day   Patient Care Team:  Marcos Walls MD as PCP - General (Family Medicine)  Ken Headley MD as Consulting Physician (Urology)    Chief Complaint:  I feel better today    Subjective     Interval History:     Patient Complaints: none  Patient Denies:  Fevers or pain  History taken from: patient    Review of Systems:   Review of Systems   Constitutional: Negative for chills and fever.   Gastrointestinal: Negative for nausea and vomiting.   Genitourinary: Negative for flank pain.       Objective     Vital Signs  Temp:  [98.6 °F (37 °C)-100.3 °F (37.9 °C)] 99 °F (37.2 °C)  Heart Rate:  [66-85] 70  Resp:  [16-18] 16  BP: (140-178)/(81-89) 153/87    Physical Exam:  Physical Exam   Constitutional: No distress.   Pulmonary/Chest: Effort normal.   Skin: He is not diaphoretic.   Vitals " reviewed.       Results Review:       Lab Results (last 72 hours)     Procedure Component Value Units Date/Time    CBC & Differential [[id number]] Collected:  11/18/17 1116    Specimen:  Blood Updated:  11/18/17 1124    Narrative:       The following orders were created for panel order CBC & Differential.  Procedure                               Abnormality         Status                     ---------                               -----------         ------                     CBC Auto Differential[[id number]]        Abnormal            Final result                 Please view results for these tests on the individual orders.    CBC Auto Differential [[id number]]  (Abnormal) Collected:  11/18/17 1116    Specimen:  Blood Updated:  11/18/17 1124     WBC 14.04 (H) 10*3/mm3      RBC 4.94 10*6/mm3      Hemoglobin 14.1 g/dL      Hematocrit 41.7 %      MCV 84.4 fL      MCH 28.5 pg      MCHC 33.8 g/dL      RDW 13.5 %      RDW-SD 41.3 fl      MPV 10.3 fL      Platelets 210 10*3/mm3      Neutrophil % 80.9 (H) %      Lymphocyte % 9.1 (L) %      Monocyte % 8.9 %      Eosinophil % 0.4 %      Basophil % 0.3 %      Immature Grans % 0.4 %      Neutrophils, Absolute 11.36 (H) 10*3/mm3      Lymphocytes, Absolute 1.28 10*3/mm3      Monocytes, Absolute 1.25 10*3/mm3      Eosinophils, Absolute 0.06 10*3/mm3      Basophils, Absolute 0.04 10*3/mm3      Immature Grans, Absolute 0.05 (H) 10*3/mm3     Comprehensive Metabolic Panel [[id number]]  (Abnormal) Collected:  11/18/17 1116    Specimen:  Blood Updated:  11/18/17 1136     Glucose 115 (H) mg/dL      BUN 16 mg/dL      Creatinine 1.04 mg/dL      Sodium 141 mmol/L      Potassium 4.5 mmol/L      Chloride 104 mmol/L      CO2 26.0 mmol/L      Calcium 8.9 mg/dL      Total Protein 6.8 g/dL      Albumin 3.80 g/dL      ALT (SGPT) 31 U/L      AST (SGOT) 29 U/L      Alkaline Phosphatase 84 U/L      Total Bilirubin 0.6 mg/dL      eGFR Non African Amer 72 mL/min/1.73      Globulin 3.0 gm/dL      A/G  Ratio 1.3 g/dL      BUN/Creatinine Ratio 15.4     Anion Gap 11.0 mmol/L     Light Blue Top [[id number]] Collected:  11/18/17 1116    Specimen:  Blood Updated:  11/18/17 1231     Extra Tube hold for add-on      Auto resulted       [place] Draw [[id number]] Collected:  11/18/17 1116    Specimen:  Blood Updated:  11/18/17 1231    Narrative:       The following orders were created for panel order [place] Draw.  Procedure                               Abnormality         Status                     ---------                               -----------         ------                     Light Blue Top[[id number]]                                   Final result               Green Top (Gel)[[id number]]                                  Final result               Lavender Top[[id number]]                                     Final result               Red Top[[id number]]                                          Final result                 Please view results for these tests on the individual orders.    Green Top (Gel) [[id number]] Collected:  11/18/17 1116    Specimen:  Blood Updated:  11/18/17 1231     Extra Tube Hold for add-ons.      Auto resulted.       Lavender Top [[id number]] Collected:  11/18/17 1116    Specimen:  Blood Updated:  11/18/17 1231     Extra Tube hold for add-on      Auto resulted       Red Top [[id number]] Collected:  11/18/17 1116    Specimen:  Blood Updated:  11/18/17 1231     Extra Tube Hold for add-ons.      Auto resulted.       Urinalysis With / Culture If Indicated - Urine, Clean Catch [[id number]]  (Abnormal) Collected:  11/18/17 1210    Specimen:  Urine from Urine, Clean Catch Updated:  11/18/17 1252     Color, UA Dark Yellow (A)     Appearance, UA Clear     pH, UA 6.5     Specific Gravity, UA 1.014     Glucose, UA Negative     Ketones, UA Negative     Bilirubin, UA Negative     Blood, UA Large (3+) (A)     Protein, UA Trace (A)     Leuk Esterase, UA Moderate (2+) (A)     Nitrite, UA Positive (A)      Urobilinogen, UA 1.0 E.U./dL    Urinalysis, Microscopic Only - Urine, Clean Catch [[id number]]  (Abnormal) Collected:  11/18/17 1210    Specimen:  Urine from Urine, Clean Catch Updated:  11/18/17 1252     RBC, UA Too Numerous to Count (A) /HPF      WBC, UA 13-20 (A) /HPF      Bacteria, UA Trace (A) /HPF      Squamous Epithelial Cells, UA 0-2 /HPF      Yeast, UA Small/1+ Yeast /HPF      Hyaline Casts, UA None Seen /LPF      Methodology Automated Microscopy    Blood Culture - Blood, Blood, Venous Line [[id number]]  (Normal) Collected:  11/18/17 1339    Specimen:  Blood from Hand, Left Updated:  11/19/17 0246     Blood Culture No growth at less than 24 hours    Blood Culture - Blood, Blood, Venous Line [[id number]]  (Normal) Collected:  11/18/17 1340    Specimen:  Blood from Arm, Left Updated:  11/19/17 0246     Blood Culture No growth at less than 24 hours    CBC (No Diff) [[id number]]  (Abnormal) Collected:  11/19/17 0444    Specimen:  Blood Updated:  11/19/17 0513     WBC 9.32 10*3/mm3      RBC 4.66 (L) 10*6/mm3      Hemoglobin 13.2 (L) g/dL      Hematocrit 40.4 %      MCV 86.7 fL      MCH 28.3 pg      MCHC 32.7 (L) g/dL      RDW 14.0 %      RDW-SD 44.0 fl      MPV 10.4 fL      Platelets 198 10*3/mm3     Comprehensive Metabolic Panel [[id number]]  (Abnormal) Collected:  11/19/17 0550    Specimen:  Blood Updated:  11/19/17 0644     Glucose 116 (H) mg/dL      BUN 18 mg/dL       Specimen hemolyzed. Results may be affected.        Creatinine 1.46 (H) mg/dL      Sodium 139 mmol/L      Potassium 4.8 mmol/L       Specimen hemolyzed.  Results may be affected.        Chloride 105 mmol/L      CO2 26.0 mmol/L      Calcium 8.1 (L) mg/dL      Total Protein 6.3 g/dL      Albumin 3.50 g/dL      ALT (SGPT) 32 U/L       Specimen hemolyzed.  Results may be affected.        AST (SGOT) 26 U/L       Specimen hemolyzed.  Results may be affected.        Alkaline Phosphatase 67 U/L       Specimen hemolyzed. Results may be affected.        Total  Bilirubin 0.7 mg/dL      eGFR Non African Amer 49 (L) mL/min/1.73      Globulin 2.8 gm/dL      A/G Ratio 1.3 g/dL      BUN/Creatinine Ratio 12.3     Anion Gap 8.0 mmol/L     Urine Culture - Urine, Urine, Clean Catch [[id number]]  (Normal) Collected:  11/18/17 1210    Specimen:  Urine from Urine, Clean Catch Updated:  11/19/17 0809     Urine Culture No growth at 24 hours        Imaging Results (last 72 hours)     Procedure Component Value Units Date/Time    CT Abdomen Pelvis Stone Protocol [[id number]] Collected:  11/18/17 1207     Updated:  11/18/17 1218    Narrative:       EXAMINATION: CT ABDOMEN PELVIS STONE PROTOCOL-  11/18/2017 12:07 PM CST     HISTORY: Urinary tract calculi. History of recent right ureteral stent  removal. Right-sided flank pain     COMPARISON:None     TECHNIQUE:Radiation dose equals  mGy-cm.  Automated exposure  control dose reduction technique was implemented.     Thin section axial images were obtained without intravenous or oral  contrast. 2-D sagittal and coronal reconstruction images were generated.     FINDINGS:     In the proximal right ureter there are 2 or 3 small ureteral stones  identified measuring approximately 2 mm in diameter closely position.  There is mild to moderate right-sided pelvocaliectasis and mild  hydroureter with mild induration of the periureteral fat. There are no  additional ureteral stones appreciated. The proximal stones appear  nonobstructing based on the ureteral size.     Additionally there are multiple nonobstructing centrally located calculi  in right kidney, largest measuring approximately 6 mm.     There are small 2 mm nonobstructing calculus appreciated centrally in  the left kidney. There is no left-sided ureterolithiasis or obstructive  uropathy change.     Urinary bladder is decompressed without focal bladder wall abnormality.  The prostate gland is not significantly enlarged.     Calcified granuloma noted in the left lung base.     There is no  CT evidence of gallstones.     Calcified granuloma noted in the spleen.     There are no adrenal masses.     There is mild fatty infiltration of the pancreas.     Stool and gas identified throughout the colon which is not dilated.  Minimal diverticulosis in the sigmoid colon observed. The appendix is  not inflamed. Appendicolith in the appendix most likely.     Small bowel is not dilated. The duodenal sweep imaged appropriately. The  stomach is not distended.     There is mild atherosclerotic aortoiliac calcifications.     There are small para-aortic and mesenteric lymph nodes. There are no  pathologically enlarged nodes.     There is a fat-containing, uncomplicated umbilical hernia.     Multilevel disc degeneration spondylosis appreciated in the lumbar and  thoracic spine.       Impression:       1. Moderate right-sided pelvocaliectasis and hydroureter with 2 or 3  closely positioned stones measuring approximately 2 mm stones in the  proximal right ureter, described above in detail.  This report was finalized on 11/18/2017 12:14 by Dr. Thee Leon MD.          Medication Review:     Current Facility-Administered Medications:   •  amLODIPine (NORVASC) tablet 5 mg, 5 mg, Oral, QAM, Claudio Cheng MD, 5 mg at 11/19/17 0736  •  calcium carbonate (TUMS) chewable tablet 500 mg (200 mg elemental), 2 tablet, Oral, BID PRN, Claudio Cheng MD  •  HYDROcodone-acetaminophen (NORCO) 5-325 MG per tablet 1 tablet, 1 tablet, Oral, Q4H PRN, Claudio Cheng MD, 1 tablet at 11/19/17 0736  •  ketorolac (TORADOL) injection 15 mg, 15 mg, Intravenous, Q6H PRN, Claudio Cheng MD, 15 mg at 11/18/17 2205  •  losartan (COZAAR) tablet 100 mg, 100 mg, Oral, Q PM, Claudio Cheng MD, 100 mg at 11/18/17 1648  •  Morphine sulfate (PF) injection 2 mg, 2 mg, Intravenous, Q3H PRN **AND** naloxone (NARCAN) injection 0.4 mg, 0.4 mg, Intravenous, Q5 Min PRN, Claudio Cheng MD  •  ondansetron (ZOFRAN) injection 4 mg, 4 mg,  Intravenous, Q6H PRN, Claudio Cheng MD  •  phenazopyridine (PYRIDIUM) tablet 100 mg, 100 mg, Oral, TID PRN, Claudio Cheng MD  •  sodium chloride 0.9 % flush 1-10 mL, 1-10 mL, Intravenous, PRN, Claudio Cheng MD  •  Insert peripheral IV, , , Once **AND** sodium chloride 0.9 % flush 10 mL, 10 mL, Intravenous, PRN, Tatum Livingston MD  •  sodium chloride 0.9 % with KCl 20 mEq/L infusion, 125 mL/hr, Intravenous, Continuous, Claudio Cheng MD, Last Rate: 125 mL/hr at 11/19/17 0629, 125 mL/hr at 11/19/17 0629  •  tamsulosin (FLOMAX) 24 hr capsule 0.4 mg, 0.4 mg, Oral, Nightly, Claudio Cheng MD    Assessment/Plan     Active Problems:    Right ureteral stone    Flank pain    Urinary tract infection    Hydronephrosis    Patient overall feels much better.  No fevers overnight.  His flank pain has resolved.  Urine culture preliminary negative.  At this point I am comfortable discharging the hospital.  I discussed with him that if he develops fevers uncontrolled flank pain nausea vomiting to please return or call my office.  He will follow back up with Dr. Headley.      Claudio Cheng MD  11/19/17  8:27 AM         Electronically signed by Claudio Cheng MD at 11/19/2017  8:28 AM           Discharge Summary      Claudio Cheng MD at 11/19/2017  8:28 AM            Date of Discharge:  11/19/2017    Discharge Diagnosis:   #1.  Flank pain  Right ureteral stone  Fever    Presenting Problem/History of Present Illness  Right ureteral stone [N20.1]     Hospital Course  Patient is a 61 y.o. male presented with low-grade fever, flank pain.  On CT scan he was found to have a possible small stone in the ureter as well as a questionable positive urinary tract infection.  Patient admitted for further workup.  I will review the CT scan these did not appear to be 2 very small stones in the proximal ureter that were likely nonobstructing.  Urine cultures were negative.  The patient's pain improved with no  evidence of fevers this hospitalization.  I do believe this is likely spasms from removing the stent which is not uncommon after a procedure like this.  At the time of discharge patient was afebrile, denied any flank pain, urine and blood cultures were negative..      Procedures Performed         Consults:   Consults     No orders found for last 30 day(s).          Condition on Discharge:  Stable    Vital Signs  Temp:  [98.6 °F (37 °C)-100.3 °F (37.9 °C)] 99 °F (37.2 °C)  Heart Rate:  [66-85] 70  Resp:  [16-18] 16  BP: (140-178)/(81-89) 153/87      Discharge Disposition  Home or Self Care    Discharge Medications   Ken Edmondson   Home Medication Instructions MARKIE:[id number]    Printed on:11/19/17 0836   Medication Information                      amLODIPine (NORVASC) 5 MG tablet  Take 5 mg by mouth Every Night.             Coenzyme Q10 (CO Q 10 PO)  Take 1 tablet by mouth Daily.             Fexofenadine HCl (ALLEGRA PO)  Take 1 tablet by mouth Daily As Needed (allergies).             HYDROcodone-acetaminophen (NORCO) 5-325 MG per tablet  Take 1 tablet by mouth Every 6 (Six) Hours As Needed for Moderate Pain .             levoFLOXacin (LEVAQUIN) 500 MG tablet  Take 1 tablet by mouth Daily for 3 days.             losartan (COZAAR) 100 MG tablet  Take 100 mg by mouth Daily.             NAPROXEN PO  Take 440 mg by mouth 2 (Two) Times a Day As Needed (pain).             Omega-3 Fatty Acids (FISH OIL) 1000 MG capsule capsule  Take 1,000 mg by mouth Daily With Breakfast.             phenazopyridine (PYRIDIUM) 100 MG tablet  Take 1 tablet by mouth 3 (Three) Times a Day As Needed for bladder spasms.             tamsulosin (FLOMAX) 0.4 MG capsule 24 hr capsule  Take 1 capsule by mouth Every Night.                 Discharge Diet:   Diet Instructions     Advance Diet As Tolerated                     Activity at Discharge:   Activity Instructions     Activity as Tolerated                     Follow-up Appointments  Future  Appointments  Date Time Provider Department Center   1/3/2018 1:50 PM Ken Headley MD [facility] None     Additional Instructions for the Follow-ups that You Need to Schedule     Discharge Follow-up with Specified Provider: Dr. Headley 6 weeks with renal utlrasound same day    As directed    To:  Dr. Headley 6 weeks with renal utlrasound same day                 Test Results Pending at Discharge   Order Current Status    Blood Culture - Blood, Blood, Venous Line Preliminary result    Blood Culture - Blood, Blood, Venous Line Preliminary result    Urine Culture - Urine, Urine, Clean Catch Preliminary result           Claudio Cheng MD  11/19/17  8:28 AM    Time: Discharge Less than 30 min             Electronically signed by Claudio Cheng MD at 11/19/2017  8:30 AM

## 2017-11-23 LAB
BACTERIA SPEC AEROBE CULT: NORMAL
BACTERIA SPEC AEROBE CULT: NORMAL

## 2017-12-18 ENCOUNTER — OFFICE VISIT (OUTPATIENT)
Dept: UROLOGY | Facility: CLINIC | Age: 62
End: 2017-12-18

## 2017-12-18 VITALS — WEIGHT: 283 LBS | BODY MASS INDEX: 38.33 KG/M2 | TEMPERATURE: 97.1 F | HEIGHT: 72 IN

## 2017-12-18 DIAGNOSIS — N20.1 RIGHT URETERAL STONE: Primary | ICD-10-CM

## 2017-12-18 LAB
BILIRUB BLD-MCNC: NEGATIVE MG/DL
CLARITY, POC: CLEAR
COLOR UR: YELLOW
GLUCOSE UR STRIP-MCNC: NEGATIVE MG/DL
KETONES UR QL: NEGATIVE
LEUKOCYTE EST, POC: NEGATIVE
NITRITE UR-MCNC: NEGATIVE MG/ML
PH UR: 7 [PH] (ref 5–8)
PROT UR STRIP-MCNC: NEGATIVE MG/DL
RBC # UR STRIP: NEGATIVE /UL
SP GR UR: 1.02 (ref 1–1.03)
UROBILINOGEN UR QL: NORMAL

## 2017-12-18 PROCEDURE — 81003 URINALYSIS AUTO W/O SCOPE: CPT | Performed by: UROLOGY

## 2017-12-18 PROCEDURE — 99213 OFFICE O/P EST LOW 20 MIN: CPT | Performed by: UROLOGY

## 2017-12-18 NOTE — PROGRESS NOTES
Subjective    Mr. Edmondson is 62 y.o. male    Chief Complaint: Right Flank Pain    History of Present Illness     Urolithiasis  Patient complains of right flank pain with radiation to the abdomen. Onset of symptoms was abrupt starting 6 week ago with unchanged course since that time. Patient describes the pain as colicky, continuous and rated as moderate. The patient has had nausea and vomiting. There has been no fever or chills. The patient is not complaining of dysuria, frequency, or urgency.  Previous management of stones includes none    The following portions of the patient's history were reviewed and updated as appropriate: allergies, current medications, past family history, past medical history, past social history, past surgical history and problem list.    Review of Systems   Constitutional: Negative for chills and fever.   Gastrointestinal: Negative for abdominal pain, anal bleeding and blood in stool.   Genitourinary: Negative for flank pain and hematuria.         Current Outpatient Prescriptions:   •  amLODIPine (NORVASC) 5 MG tablet, Take 5 mg by mouth Every Night., Disp: , Rfl:   •  Coenzyme Q10 (CO Q 10 PO), Take 1 tablet by mouth Daily., Disp: , Rfl:   •  Fexofenadine HCl (ALLEGRA PO), Take 1 tablet by mouth Daily As Needed (allergies)., Disp: , Rfl:   •  losartan (COZAAR) 100 MG tablet, Take 100 mg by mouth Daily., Disp: , Rfl:   •  NAPROXEN PO, Take 440 mg by mouth 2 (Two) Times a Day As Needed (pain)., Disp: , Rfl:   •  Omega-3 Fatty Acids (FISH OIL) 1000 MG capsule capsule, Take 1,000 mg by mouth Daily With Breakfast., Disp: , Rfl:     Past Medical History:   Diagnosis Date   • Arthritis    • Hyperlipidemia    • Hypertension    • Joint pain    • Kidney stones    • Sinus infection 11/13/2017   • Sleep apnea with use of continuous positive airway pressure (CPAP)        Past Surgical History:   Procedure Laterality Date   • KNEE ARTHROSCOPY MENISCUS TRANSPLANT Left    • URETEROSCOPY LASER LITHOTRIPSY  "WITH STENT INSERTION Right 11/14/2017    Procedure: URETEROSCOPY LASER LITHOTRIPSY WITH STENT INSERTION;  Surgeon: Ken Headley MD;  Location: [facility] OR;  Service:        Social History     Social History   • Marital status:      Spouse name: N/A   • Number of children: N/A   • Years of education: N/A     Social History Main Topics   • Smoking status: Former Smoker     Types: Cigarettes   • Smokeless tobacco: Never Used   • Alcohol use Yes      Comment: occ.   • Drug use: No   • Sexual activity: Defer     Other Topics Concern   • None     Social History Narrative       Family History   Problem Relation Age of Onset   • Hypertension Father        Objective    Temp 97.1 °F (36.2 °C)  Ht 182.9 cm (72\")  Wt 128 kg (283 lb)  BMI 38.38 kg/m2    Physical Exam   Constitutional: He is oriented to person, place, and time. He appears well-developed and well-nourished. No distress.   Pulmonary/Chest: Effort normal.   Abdominal: Soft. He exhibits no distension and no mass. There is no tenderness. There is no rebound and no guarding. No hernia.   Neurological: He is alert and oriented to person, place, and time.   Skin: Skin is warm and dry. He is not diaphoretic.   Psychiatric: He has a normal mood and affect.   Vitals reviewed.      Renal ultrasound independent review    The renal ultrasound is available for me to review.  Treatment recommendations require an independent review.  This film has been reviewed by the radiologist to determine any non urologic abnormalities that are presents.  However, I very closely inspected the kidneys for size, symmetry, contour, parenchymal thickness, perinephric reaction, presence of calcifications, and intrarenal dilation of the collecting system.       The right kidney appears normal on this ultrasound.  The renal parenchymal is norml in thickness.  There are no solid masses or cysts.  There is no hydronephrosis.  There are no stones.      The left kidney appears normal on " this ultrasound.  The renal parenchymal is norml in thickness.  There are no solid masses or cysts.  There is no hydronephrosis.  There are no stones.      The bladder appears normal on thisultrsaound.  The bladder appears normal in thickness.  There no masses or stones seen on this exam.         Results for orders placed or performed in visit on 12/18/17   POC Urinalysis Dipstick, Automated   Result Value Ref Range    Color Yellow Yellow, Straw, Dark Yellow, Rachel    Clarity, UA Clear Clear    Glucose, UA Negative Negative, 1000 mg/dL (3+) mg/dL    Bilirubin Negative Negative    Ketones, UA Negative Negative    Specific Gravity  1.020 1.005 - 1.030    Blood, UA Negative Negative    pH, Urine 7.0 5.0 - 8.0    Protein, POC Negative Negative mg/dL    Urobilinogen, UA Normal Normal    Leukocytes Negative Negative    Nitrite, UA Negative Negative   Renal ultrasound independent review    The renal ultrasound is available for me to review.  Treatment recommendations require an independent review.  This film has been reviewed by the radiologist to determine any non urologic abnormalities that are presents.  However, I very closely inspected the kidneys for size, symmetry, contour, parenchymal thickness, perinephric reaction, presence of calcifications, and intrarenal dilation of the collecting system.       The right kidney appears normal on this ultrasound.  The renal parenchymal is norml in thickness.  There are no solid masses or cysts.  There is no hydronephrosis.  There are no stones.      The left kidney appears normal on this ultrasound.  The renal parenchymal is norml in thickness.  There are no solid masses or cysts.  There is no hydronephrosis.  There are no stones.      The bladder appears normal on thisultrsaound.  The bladder appears normal in thickness.  There no masses or stones seen on this exam.       Assessment and Plan    Diagnoses and all orders for this visit:    Right ureteral stone  -     POC  Urinalysis Dipstick, Automated        Patient with no Hydro or no stones on ultrasound.  We will check a KUB in 6 months.  If that is negative he will follow-up when necessary diet instructions given

## 2018-06-19 NOTE — PROGRESS NOTES
Subjective    Mr. Edmondson is 62 y.o. male    Chief Complaint: Right Flank Pain    History of Present Illness    Urolithiasis  Patient complains of right flank pain with radiation to the abdomen. Onset of symptoms was abrupt starting 6 week ago with unchanged course since that time. Patient describes the pain as colicky, continuous and rated as moderate. The patient has had nausea and vomiting. There has been no fever or chills. The patient is not complaining of dysuria, frequency, or urgency.  Previous management of stones includes none.    The following portions of the patient's history were reviewed and updated as appropriate: allergies, current medications, past family history, past medical history, past social history, past surgical history and problem list.    Review of Systems   Constitutional: Negative for chills and fever.   Gastrointestinal: Negative for abdominal pain, anal bleeding and blood in stool.   Genitourinary: Negative for flank pain, frequency, hematuria and urgency.         Current Outpatient Prescriptions:   •  amLODIPine (NORVASC) 5 MG tablet, Take 5 mg by mouth Every Night., Disp: , Rfl:   •  Coenzyme Q10 (CO Q 10 PO), Take 1 tablet by mouth Daily., Disp: , Rfl:   •  Fexofenadine HCl (ALLEGRA PO), Take 1 tablet by mouth Daily As Needed (allergies)., Disp: , Rfl:   •  losartan (COZAAR) 100 MG tablet, Take 100 mg by mouth Daily., Disp: , Rfl:   •  NAPROXEN PO, Take 440 mg by mouth 2 (Two) Times a Day As Needed (pain)., Disp: , Rfl:   •  Omega-3 Fatty Acids (FISH OIL) 1000 MG capsule capsule, Take 1,000 mg by mouth Daily With Breakfast., Disp: , Rfl:     Past Medical History:   Diagnosis Date   • Arthritis    • Hyperlipidemia    • Hypertension    • Joint pain    • Kidney stones    • Sinus infection 11/13/2017   • Sleep apnea with use of continuous positive airway pressure (CPAP)        Past Surgical History:   Procedure Laterality Date   • KNEE ARTHROSCOPY MENISCUS TRANSPLANT Left    •  URETEROSCOPY LASER LITHOTRIPSY WITH STENT INSERTION Right 11/14/2017    Procedure: URETEROSCOPY LASER LITHOTRIPSY WITH STENT INSERTION;  Surgeon: Ken Headley MD;  Location: [facility];  Service:        Social History     Social History   • Marital status:      Social History Main Topics   • Smoking status: Former Smoker     Types: Cigarettes   • Smokeless tobacco: Never Used   • Alcohol use Yes      Comment: occ.   • Drug use: No   • Sexual activity: Defer     Other Topics Concern   • Not on file       Family History   Problem Relation Age of Onset   • Hypertension Father        Objective    There were no vitals taken for this visit.    Physical Exam   Constitutional: He is oriented to person, place, and time. He appears well-developed and well-nourished. No distress.   Pulmonary/Chest: Effort normal.   Abdominal: Soft. He exhibits no distension and no mass. There is no tenderness. There is no rebound and no guarding. No hernia.   Neurological: He is alert and oriented to person, place, and time.   Skin: Skin is warm and dry. He is not diaphoretic.   Psychiatric: He has a normal mood and affect.   Vitals reviewed.          Results for orders placed or performed in visit on 12/18/17   POC Urinalysis Dipstick, Automated   Result Value Ref Range    Color Yellow Yellow, Straw, Dark Yellow, Rachel    Clarity, UA Clear Clear    Glucose, UA Negative Negative, 1000 mg/dL (3+) mg/dL    Bilirubin Negative Negative    Ketones, UA Negative Negative    Specific Gravity  1.020 1.005 - 1.030    Blood, UA Negative Negative    pH, Urine 7.0 5.0 - 8.0    Protein, POC Negative Negative mg/dL    Urobilinogen, UA Normal Normal    Leukocytes Negative Negative    Nitrite, UA Negative Negative   KUB independent review    A KUB is available for me to review today.  The image is inspected for a bowel gas pattern and the general bone structure of the spine and pelvis. The kidneys are then inspected closely.  Renal outline is  noted if identifiable. The kidney, collecting system, and anticipated path of the ureter are examined for calcifications including those in the true pelvis.  This film reveals:    On the right there is a single renal stone measuring 4 mm.    On the left there are no calcificaitons seen in the kidney or the expected course of the ureter. .      Assessment and Plan    Ken was seen today for right ureteral stone.    Diagnoses and all orders for this visit:    Kidney stones  -     POC Urinalysis Dipstick, Multipro  -     XR Abdomen KUB; Future        Status post right ureteroscopy and 2017.  His KUB today did show a stone in the right kidney.  He is asymptomatic.  I would like to continue to follow this every 6 months.  I did discuss with him proceeding with ESWL and he would like to hold off on that as well.

## 2018-06-20 ENCOUNTER — OFFICE VISIT (OUTPATIENT)
Dept: UROLOGY | Facility: CLINIC | Age: 63
End: 2018-06-20

## 2018-06-20 DIAGNOSIS — N20.0 KIDNEY STONES: Primary | ICD-10-CM

## 2018-06-20 PROCEDURE — 99213 OFFICE O/P EST LOW 20 MIN: CPT | Performed by: UROLOGY

## 2018-06-21 DIAGNOSIS — N20.0 KIDNEY STONES: Primary | ICD-10-CM

## 2018-06-21 DIAGNOSIS — N20.0 KIDNEY STONES: ICD-10-CM

## 2018-10-09 ENCOUNTER — HOSPITAL ENCOUNTER (OUTPATIENT)
Dept: GENERAL RADIOLOGY | Age: 63
Discharge: HOME | End: 2018-10-09
Payer: COMMERCIAL

## 2018-10-09 ENCOUNTER — HOSPITAL ENCOUNTER (OUTPATIENT)
Dept: PREADMISSION TESTING | Age: 63
Discharge: HOME | End: 2018-10-13
Payer: COMMERCIAL

## 2018-10-09 VITALS — BODY MASS INDEX: 36.57 KG/M2 | WEIGHT: 270 LBS | HEIGHT: 72 IN

## 2018-10-09 LAB
ALBUMIN SERPL-MCNC: 4.3 G/DL (ref 3.5–5.2)
ALP BLD-CCNC: 98 U/L (ref 40–130)
ALT SERPL-CCNC: 18 U/L (ref 5–41)
ANION GAP SERPL CALCULATED.3IONS-SCNC: 15 MMOL/L (ref 7–19)
APTT: 27.6 SEC (ref 26–36.2)
AST SERPL-CCNC: 18 U/L (ref 5–40)
BASOPHILS ABSOLUTE: 0 K/UL (ref 0–0.2)
BASOPHILS RELATIVE PERCENT: 0.6 % (ref 0–1)
BILIRUB SERPL-MCNC: 0.6 MG/DL (ref 0.2–1.2)
BILIRUBIN URINE: NEGATIVE
BLOOD, URINE: NEGATIVE
BUN BLDV-MCNC: 19 MG/DL (ref 8–23)
CALCIUM SERPL-MCNC: 9.4 MG/DL (ref 8.8–10.2)
CHLORIDE BLD-SCNC: 105 MMOL/L (ref 98–111)
CLARITY: CLEAR
CO2: 24 MMOL/L (ref 22–29)
COLOR: YELLOW
CREAT SERPL-MCNC: 0.6 MG/DL (ref 0.5–1.2)
EKG P AXIS: 57 DEGREES
EKG P-R INTERVAL: 182 MS
EKG Q-T INTERVAL: 462 MS
EKG QRS DURATION: 104 MS
EKG QTC CALCULATION (BAZETT): 455 MS
EKG T AXIS: 46 DEGREES
EOSINOPHILS ABSOLUTE: 0.2 K/UL (ref 0–0.6)
EOSINOPHILS RELATIVE PERCENT: 3 % (ref 0–5)
GFR NON-AFRICAN AMERICAN: >60
GLUCOSE BLD-MCNC: 112 MG/DL (ref 74–109)
GLUCOSE URINE: NEGATIVE MG/DL
HCT VFR BLD CALC: 46.5 % (ref 42–52)
HEMOGLOBIN: 15.2 G/DL (ref 14–18)
INR BLD: 0.99 (ref 0.88–1.18)
KETONES, URINE: NEGATIVE MG/DL
LEUKOCYTE ESTERASE, URINE: NEGATIVE
LYMPHOCYTES ABSOLUTE: 2.1 K/UL (ref 1.1–4.5)
LYMPHOCYTES RELATIVE PERCENT: 30.8 % (ref 20–40)
MCH RBC QN AUTO: 28.2 PG (ref 27–31)
MCHC RBC AUTO-ENTMCNC: 32.7 G/DL (ref 33–37)
MCV RBC AUTO: 86.3 FL (ref 80–94)
MONOCYTES ABSOLUTE: 0.6 K/UL (ref 0–0.9)
MONOCYTES RELATIVE PERCENT: 9.2 % (ref 0–10)
NEUTROPHILS ABSOLUTE: 3.7 K/UL (ref 1.5–7.5)
NEUTROPHILS RELATIVE PERCENT: 56.1 % (ref 50–65)
NITRITE, URINE: NEGATIVE
PDW BLD-RTO: 14.1 % (ref 11.5–14.5)
PH UA: 7
PLATELET # BLD: 174 K/UL (ref 130–400)
PMV BLD AUTO: 11 FL (ref 9.4–12.4)
POTASSIUM SERPL-SCNC: 4.1 MMOL/L (ref 3.5–5)
PROTEIN UA: NEGATIVE MG/DL
PROTHROMBIN TIME: 13 SEC (ref 12–14.6)
RBC # BLD: 5.39 M/UL (ref 4.7–6.1)
SODIUM BLD-SCNC: 144 MMOL/L (ref 136–145)
SPECIFIC GRAVITY UA: 1.01
TOTAL PROTEIN: 7.2 G/DL (ref 6.6–8.7)
URINE REFLEX TO CULTURE: NORMAL
UROBILINOGEN, URINE: 0.2 E.U./DL
WBC # BLD: 6.7 K/UL (ref 4.8–10.8)

## 2018-10-09 PROCEDURE — 87081 CULTURE SCREEN ONLY: CPT

## 2018-10-09 PROCEDURE — 80053 COMPREHEN METABOLIC PANEL: CPT

## 2018-10-09 PROCEDURE — 93005 ELECTROCARDIOGRAM TRACING: CPT

## 2018-10-09 PROCEDURE — 85730 THROMBOPLASTIN TIME PARTIAL: CPT

## 2018-10-09 PROCEDURE — 85610 PROTHROMBIN TIME: CPT

## 2018-10-09 PROCEDURE — 71046 X-RAY EXAM CHEST 2 VIEWS: CPT

## 2018-10-09 PROCEDURE — 85025 COMPLETE CBC W/AUTO DIFF WBC: CPT

## 2018-10-09 PROCEDURE — 81003 URINALYSIS AUTO W/O SCOPE: CPT

## 2018-10-09 RX ORDER — PREGABALIN 75 MG/1
75 CAPSULE ORAL ONCE
Status: CANCELLED | OUTPATIENT
Start: 2018-10-24

## 2018-10-09 RX ORDER — COVID-19 ANTIGEN TEST
2 KIT MISCELLANEOUS 3 TIMES DAILY PRN
Status: ON HOLD | COMMUNITY
End: 2018-10-26 | Stop reason: HOSPADM

## 2018-10-09 RX ORDER — OXYCODONE HCL 10 MG/1
10 TABLET, FILM COATED, EXTENDED RELEASE ORAL ONCE
Status: CANCELLED | OUTPATIENT
Start: 2018-10-24

## 2018-10-09 RX ORDER — CELECOXIB 200 MG/1
200 CAPSULE ORAL ONCE
Status: CANCELLED | OUTPATIENT
Start: 2018-10-24

## 2018-10-09 RX ORDER — DEXAMETHASONE SODIUM PHOSPHATE 10 MG/ML
10 INJECTION, SOLUTION INTRAMUSCULAR; INTRAVENOUS ONCE
Status: CANCELLED | OUTPATIENT
Start: 2018-10-24

## 2018-10-09 RX ORDER — AMLODIPINE BESYLATE 10 MG/1
10 TABLET ORAL NIGHTLY
COMMUNITY

## 2018-10-09 RX ORDER — ACETAMINOPHEN 500 MG
1000 TABLET ORAL ONCE
Status: CANCELLED | OUTPATIENT
Start: 2018-10-24

## 2018-10-09 RX ORDER — AMPICILLIN TRIHYDRATE 250 MG
1 CAPSULE ORAL DAILY
COMMUNITY

## 2018-10-10 LAB — MRSA CULTURE ONLY: NORMAL

## 2018-10-23 ENCOUNTER — ANESTHESIA EVENT (OUTPATIENT)
Dept: OPERATING ROOM | Age: 63
DRG: 470 | End: 2018-10-23
Payer: COMMERCIAL

## 2018-10-24 ENCOUNTER — APPOINTMENT (OUTPATIENT)
Dept: GENERAL RADIOLOGY | Age: 63
DRG: 470 | End: 2018-10-24
Attending: ORTHOPAEDIC SURGERY
Payer: COMMERCIAL

## 2018-10-24 ENCOUNTER — ANESTHESIA (OUTPATIENT)
Dept: OPERATING ROOM | Age: 63
DRG: 470 | End: 2018-10-24
Payer: COMMERCIAL

## 2018-10-24 ENCOUNTER — HOSPITAL ENCOUNTER (INPATIENT)
Age: 63
LOS: 2 days | Discharge: HOME HEALTH | DRG: 470 | End: 2018-10-26
Attending: ORTHOPAEDIC SURGERY | Admitting: ORTHOPAEDIC SURGERY
Payer: COMMERCIAL

## 2018-10-24 VITALS
DIASTOLIC BLOOD PRESSURE: 67 MMHG | OXYGEN SATURATION: 99 % | RESPIRATION RATE: 13 BRPM | TEMPERATURE: 97.3 F | SYSTOLIC BLOOD PRESSURE: 116 MMHG

## 2018-10-24 DIAGNOSIS — M17.10 ARTHRITIS OF KNEE: Primary | ICD-10-CM

## 2018-10-24 LAB
ABO/RH: NORMAL
ANTIBODY SCREEN: NORMAL

## 2018-10-24 PROCEDURE — 2580000003 HC RX 258: Performed by: ORTHOPAEDIC SURGERY

## 2018-10-24 PROCEDURE — 2709999900 HC NON-CHARGEABLE SUPPLY: Performed by: ORTHOPAEDIC SURGERY

## 2018-10-24 PROCEDURE — 36415 COLL VENOUS BLD VENIPUNCTURE: CPT

## 2018-10-24 PROCEDURE — 2720000010 HC SURG SUPPLY STERILE: Performed by: ORTHOPAEDIC SURGERY

## 2018-10-24 PROCEDURE — 86850 RBC ANTIBODY SCREEN: CPT

## 2018-10-24 PROCEDURE — 86901 BLOOD TYPING SEROLOGIC RH(D): CPT

## 2018-10-24 PROCEDURE — C1713 ANCHOR/SCREW BN/BN,TIS/BN: HCPCS | Performed by: ORTHOPAEDIC SURGERY

## 2018-10-24 PROCEDURE — 7100000001 HC PACU RECOVERY - ADDTL 15 MIN: Performed by: ORTHOPAEDIC SURGERY

## 2018-10-24 PROCEDURE — G8988 SELF CARE GOAL STATUS: HCPCS

## 2018-10-24 PROCEDURE — 6370000000 HC RX 637 (ALT 250 FOR IP): Performed by: ORTHOPAEDIC SURGERY

## 2018-10-24 PROCEDURE — 0QBH0ZZ EXCISION OF LEFT TIBIA, OPEN APPROACH: ICD-10-PCS | Performed by: ORTHOPAEDIC SURGERY

## 2018-10-24 PROCEDURE — 0SRD0J9 REPLACEMENT OF LEFT KNEE JOINT WITH SYNTHETIC SUBSTITUTE, CEMENTED, OPEN APPROACH: ICD-10-PCS | Performed by: ORTHOPAEDIC SURGERY

## 2018-10-24 PROCEDURE — 6360000002 HC RX W HCPCS: Performed by: ORTHOPAEDIC SURGERY

## 2018-10-24 PROCEDURE — 2500000003 HC RX 250 WO HCPCS: Performed by: ORTHOPAEDIC SURGERY

## 2018-10-24 PROCEDURE — 3700000001 HC ADD 15 MINUTES (ANESTHESIA): Performed by: ORTHOPAEDIC SURGERY

## 2018-10-24 PROCEDURE — 2700000000 HC OXYGEN THERAPY PER DAY

## 2018-10-24 PROCEDURE — 3600000005 HC SURGERY LEVEL 5 BASE: Performed by: ORTHOPAEDIC SURGERY

## 2018-10-24 PROCEDURE — G8979 MOBILITY GOAL STATUS: HCPCS

## 2018-10-24 PROCEDURE — 97165 OT EVAL LOW COMPLEX 30 MIN: CPT

## 2018-10-24 PROCEDURE — 6360000002 HC RX W HCPCS

## 2018-10-24 PROCEDURE — G8978 MOBILITY CURRENT STATUS: HCPCS

## 2018-10-24 PROCEDURE — 64447 NJX AA&/STRD FEMORAL NRV IMG: CPT

## 2018-10-24 PROCEDURE — C9290 INJ, BUPIVACAINE LIPOSOME: HCPCS | Performed by: ORTHOPAEDIC SURGERY

## 2018-10-24 PROCEDURE — 6360000002 HC RX W HCPCS: Performed by: ANESTHESIOLOGY

## 2018-10-24 PROCEDURE — 2500000003 HC RX 250 WO HCPCS

## 2018-10-24 PROCEDURE — 97750 PHYSICAL PERFORMANCE TEST: CPT

## 2018-10-24 PROCEDURE — 86900 BLOOD TYPING SEROLOGIC ABO: CPT

## 2018-10-24 PROCEDURE — 3700000000 HC ANESTHESIA ATTENDED CARE: Performed by: ORTHOPAEDIC SURGERY

## 2018-10-24 PROCEDURE — 3600000015 HC SURGERY LEVEL 5 ADDTL 15MIN: Performed by: ORTHOPAEDIC SURGERY

## 2018-10-24 PROCEDURE — G8987 SELF CARE CURRENT STATUS: HCPCS

## 2018-10-24 PROCEDURE — 7100000000 HC PACU RECOVERY - FIRST 15 MIN: Performed by: ORTHOPAEDIC SURGERY

## 2018-10-24 PROCEDURE — 1210000000 HC MED SURG R&B

## 2018-10-24 PROCEDURE — C1776 JOINT DEVICE (IMPLANTABLE): HCPCS | Performed by: ORTHOPAEDIC SURGERY

## 2018-10-24 PROCEDURE — 94664 DEMO&/EVAL PT USE INHALER: CPT

## 2018-10-24 PROCEDURE — 73560 X-RAY EXAM OF KNEE 1 OR 2: CPT

## 2018-10-24 PROCEDURE — 97161 PT EVAL LOW COMPLEX 20 MIN: CPT

## 2018-10-24 DEVICE — Z DUP USE 2508614 EXTENSION STEM SZ H 5DEG L TIBL KNEE CEM NAT TIB PERSONA: Type: IMPLANTABLE DEVICE | Site: KNEE | Status: FUNCTIONAL

## 2018-10-24 DEVICE — PLUG BNE CEM L POLYETH FOR 14-17MM IM CNL: Type: IMPLANTABLE DEVICE | Site: KNEE | Status: FUNCTIONAL

## 2018-10-24 DEVICE — IMPL KNEE PATELLA ALL POLY PSN 41MM: Type: IMPLANTABLE DEVICE | Site: KNEE | Status: FUNCTIONAL

## 2018-10-24 DEVICE — IMPL KNEE PSN FEM CMT CCR STD SZ 12 LT: Type: IMPLANTABLE DEVICE | Site: KNEE | Status: FUNCTIONAL

## 2018-10-24 DEVICE — Z  INACTIVE USE 2750024 CEMENT BONE 40GM W/ GENTMYCN HI VISC PALACOS R+G: Type: IMPLANTABLE DEVICE | Site: KNEE | Status: FUNCTIONAL

## 2018-10-24 DEVICE — EXTENSION STEM L30MM DIA14MM KNEE TAPR CEM PERSONA: Type: IMPLANTABLE DEVICE | Site: KNEE | Status: FUNCTIONAL

## 2018-10-24 RX ORDER — OXYCODONE HCL 10 MG/1
10 TABLET, FILM COATED, EXTENDED RELEASE ORAL ONCE
Status: COMPLETED | OUTPATIENT
Start: 2018-10-24 | End: 2018-10-24

## 2018-10-24 RX ORDER — MIDAZOLAM HYDROCHLORIDE 1 MG/ML
2 INJECTION INTRAMUSCULAR; INTRAVENOUS
Status: DISCONTINUED | OUTPATIENT
Start: 2018-10-24 | End: 2018-10-24 | Stop reason: HOSPADM

## 2018-10-24 RX ORDER — MORPHINE SULFATE/0.9% NACL/PF 1 MG/ML
4 SYRINGE (ML) INJECTION EVERY 5 MIN PRN
Status: DISCONTINUED | OUTPATIENT
Start: 2018-10-24 | End: 2018-10-24 | Stop reason: HOSPADM

## 2018-10-24 RX ORDER — FENTANYL CITRATE 50 UG/ML
50 INJECTION, SOLUTION INTRAMUSCULAR; INTRAVENOUS
Status: DISCONTINUED | OUTPATIENT
Start: 2018-10-24 | End: 2018-10-24 | Stop reason: HOSPADM

## 2018-10-24 RX ORDER — SODIUM CHLORIDE 0.9 % (FLUSH) 0.9 %
10 SYRINGE (ML) INJECTION EVERY 12 HOURS SCHEDULED
Status: DISCONTINUED | OUTPATIENT
Start: 2018-10-24 | End: 2018-10-26 | Stop reason: HOSPADM

## 2018-10-24 RX ORDER — ONDANSETRON 2 MG/ML
4 INJECTION INTRAMUSCULAR; INTRAVENOUS EVERY 6 HOURS PRN
Status: DISCONTINUED | OUTPATIENT
Start: 2018-10-24 | End: 2018-10-26 | Stop reason: HOSPADM

## 2018-10-24 RX ORDER — PREGABALIN 75 MG/1
75 CAPSULE ORAL ONCE
Status: COMPLETED | OUTPATIENT
Start: 2018-10-24 | End: 2018-10-24

## 2018-10-24 RX ORDER — SUCCINYLCHOLINE CHLORIDE 20 MG/ML
INJECTION INTRAMUSCULAR; INTRAVENOUS PRN
Status: DISCONTINUED | OUTPATIENT
Start: 2018-10-24 | End: 2018-10-24 | Stop reason: SDUPTHER

## 2018-10-24 RX ORDER — HYDRALAZINE HYDROCHLORIDE 20 MG/ML
5 INJECTION INTRAMUSCULAR; INTRAVENOUS EVERY 10 MIN PRN
Status: DISCONTINUED | OUTPATIENT
Start: 2018-10-24 | End: 2018-10-24 | Stop reason: HOSPADM

## 2018-10-24 RX ORDER — MORPHINE SULFATE/0.9% NACL/PF 1 MG/ML
2 SYRINGE (ML) INJECTION EVERY 5 MIN PRN
Status: DISCONTINUED | OUTPATIENT
Start: 2018-10-24 | End: 2018-10-24 | Stop reason: HOSPADM

## 2018-10-24 RX ORDER — OXYCODONE HYDROCHLORIDE 5 MG/1
20 TABLET ORAL EVERY 4 HOURS PRN
Status: DISCONTINUED | OUTPATIENT
Start: 2018-10-24 | End: 2018-10-26 | Stop reason: HOSPADM

## 2018-10-24 RX ORDER — FENTANYL CITRATE 50 UG/ML
25 INJECTION, SOLUTION INTRAMUSCULAR; INTRAVENOUS
Status: DISCONTINUED | OUTPATIENT
Start: 2018-10-24 | End: 2018-10-24 | Stop reason: HOSPADM

## 2018-10-24 RX ORDER — PROPOFOL 10 MG/ML
INJECTION, EMULSION INTRAVENOUS PRN
Status: DISCONTINUED | OUTPATIENT
Start: 2018-10-24 | End: 2018-10-24 | Stop reason: SDUPTHER

## 2018-10-24 RX ORDER — SODIUM CHLORIDE 0.9 % (FLUSH) 0.9 %
10 SYRINGE (ML) INJECTION PRN
Status: DISCONTINUED | OUTPATIENT
Start: 2018-10-24 | End: 2018-10-26 | Stop reason: HOSPADM

## 2018-10-24 RX ORDER — LABETALOL HYDROCHLORIDE 5 MG/ML
5 INJECTION, SOLUTION INTRAVENOUS EVERY 10 MIN PRN
Status: DISCONTINUED | OUTPATIENT
Start: 2018-10-24 | End: 2018-10-24 | Stop reason: HOSPADM

## 2018-10-24 RX ORDER — PROMETHAZINE HYDROCHLORIDE 25 MG/ML
6.25 INJECTION, SOLUTION INTRAMUSCULAR; INTRAVENOUS
Status: DISCONTINUED | OUTPATIENT
Start: 2018-10-24 | End: 2018-10-24 | Stop reason: HOSPADM

## 2018-10-24 RX ORDER — OXYCODONE HCL 10 MG/1
10 TABLET, FILM COATED, EXTENDED RELEASE ORAL EVERY 12 HOURS SCHEDULED
Status: DISCONTINUED | OUTPATIENT
Start: 2018-10-24 | End: 2018-10-26 | Stop reason: HOSPADM

## 2018-10-24 RX ORDER — TRANEXAMIC ACID 100 MG/ML
INJECTION, SOLUTION INTRAVENOUS PRN
Status: DISCONTINUED | OUTPATIENT
Start: 2018-10-24 | End: 2018-10-24 | Stop reason: SDUPTHER

## 2018-10-24 RX ORDER — CELECOXIB 200 MG/1
200 CAPSULE ORAL ONCE
Status: COMPLETED | OUTPATIENT
Start: 2018-10-24 | End: 2018-10-24

## 2018-10-24 RX ORDER — CLINDAMYCIN PHOSPHATE 900 MG/50ML
900 INJECTION INTRAVENOUS EVERY 8 HOURS
Status: COMPLETED | OUTPATIENT
Start: 2018-10-24 | End: 2018-10-26

## 2018-10-24 RX ORDER — DEXAMETHASONE SODIUM PHOSPHATE 10 MG/ML
INJECTION INTRAMUSCULAR; INTRAVENOUS PRN
Status: DISCONTINUED | OUTPATIENT
Start: 2018-10-24 | End: 2018-10-24 | Stop reason: SDUPTHER

## 2018-10-24 RX ORDER — FENTANYL CITRATE 50 UG/ML
INJECTION, SOLUTION INTRAMUSCULAR; INTRAVENOUS PRN
Status: DISCONTINUED | OUTPATIENT
Start: 2018-10-24 | End: 2018-10-24 | Stop reason: SDUPTHER

## 2018-10-24 RX ORDER — LIDOCAINE HYDROCHLORIDE 10 MG/ML
1 INJECTION, SOLUTION EPIDURAL; INFILTRATION; INTRACAUDAL; PERINEURAL
Status: DISCONTINUED | OUTPATIENT
Start: 2018-10-24 | End: 2018-10-24 | Stop reason: HOSPADM

## 2018-10-24 RX ORDER — SODIUM CHLORIDE 0.9 % (FLUSH) 0.9 %
10 SYRINGE (ML) INJECTION EVERY 12 HOURS SCHEDULED
Status: DISCONTINUED | OUTPATIENT
Start: 2018-10-24 | End: 2018-10-24 | Stop reason: HOSPADM

## 2018-10-24 RX ORDER — MIDAZOLAM HYDROCHLORIDE 1 MG/ML
INJECTION INTRAMUSCULAR; INTRAVENOUS
Status: COMPLETED
Start: 2018-10-24 | End: 2018-10-24

## 2018-10-24 RX ORDER — ROPIVACAINE HYDROCHLORIDE 5 MG/ML
INJECTION, SOLUTION EPIDURAL; INFILTRATION; PERINEURAL PRN
Status: DISCONTINUED | OUTPATIENT
Start: 2018-10-24 | End: 2018-10-24 | Stop reason: SDUPTHER

## 2018-10-24 RX ORDER — DIAZEPAM 5 MG/1
5 TABLET ORAL EVERY 6 HOURS PRN
Status: DISCONTINUED | OUTPATIENT
Start: 2018-10-24 | End: 2018-10-26 | Stop reason: HOSPADM

## 2018-10-24 RX ORDER — ROCURONIUM BROMIDE 10 MG/ML
INJECTION, SOLUTION INTRAVENOUS PRN
Status: DISCONTINUED | OUTPATIENT
Start: 2018-10-24 | End: 2018-10-24 | Stop reason: SDUPTHER

## 2018-10-24 RX ORDER — SODIUM CHLORIDE 0.9 % (FLUSH) 0.9 %
10 SYRINGE (ML) INJECTION PRN
Status: DISCONTINUED | OUTPATIENT
Start: 2018-10-24 | End: 2018-10-24 | Stop reason: HOSPADM

## 2018-10-24 RX ORDER — LIDOCAINE HYDROCHLORIDE 10 MG/ML
INJECTION, SOLUTION EPIDURAL; INFILTRATION; INTRACAUDAL; PERINEURAL PRN
Status: DISCONTINUED | OUTPATIENT
Start: 2018-10-24 | End: 2018-10-24 | Stop reason: SDUPTHER

## 2018-10-24 RX ORDER — TRAMADOL HYDROCHLORIDE 50 MG/1
50 TABLET ORAL EVERY 6 HOURS
Status: DISCONTINUED | OUTPATIENT
Start: 2018-10-24 | End: 2018-10-26 | Stop reason: HOSPADM

## 2018-10-24 RX ORDER — DOCUSATE SODIUM 100 MG/1
100 CAPSULE, LIQUID FILLED ORAL 2 TIMES DAILY
Status: DISCONTINUED | OUTPATIENT
Start: 2018-10-24 | End: 2018-10-26 | Stop reason: HOSPADM

## 2018-10-24 RX ORDER — ACETAMINOPHEN 500 MG
1000 TABLET ORAL ONCE
Status: COMPLETED | OUTPATIENT
Start: 2018-10-24 | End: 2018-10-24

## 2018-10-24 RX ORDER — MEPERIDINE HYDROCHLORIDE 50 MG/ML
12.5 INJECTION INTRAMUSCULAR; INTRAVENOUS; SUBCUTANEOUS EVERY 5 MIN PRN
Status: DISCONTINUED | OUTPATIENT
Start: 2018-10-24 | End: 2018-10-24 | Stop reason: HOSPADM

## 2018-10-24 RX ORDER — EPHEDRINE SULFATE 50 MG/ML
INJECTION, SOLUTION INTRAVENOUS PRN
Status: DISCONTINUED | OUTPATIENT
Start: 2018-10-24 | End: 2018-10-24 | Stop reason: SDUPTHER

## 2018-10-24 RX ORDER — OXYCODONE HYDROCHLORIDE 5 MG/1
10 TABLET ORAL EVERY 4 HOURS PRN
Status: DISCONTINUED | OUTPATIENT
Start: 2018-10-24 | End: 2018-10-26 | Stop reason: HOSPADM

## 2018-10-24 RX ORDER — LOSARTAN POTASSIUM 100 MG/1
100 TABLET ORAL DAILY
COMMUNITY

## 2018-10-24 RX ORDER — KETAMINE HYDROCHLORIDE 100 MG/ML
INJECTION, SOLUTION INTRAMUSCULAR; INTRAVENOUS PRN
Status: DISCONTINUED | OUTPATIENT
Start: 2018-10-24 | End: 2018-10-24 | Stop reason: SDUPTHER

## 2018-10-24 RX ORDER — ONDANSETRON 2 MG/ML
INJECTION INTRAMUSCULAR; INTRAVENOUS PRN
Status: DISCONTINUED | OUTPATIENT
Start: 2018-10-24 | End: 2018-10-24 | Stop reason: SDUPTHER

## 2018-10-24 RX ORDER — SODIUM CHLORIDE 9 MG/ML
INJECTION, SOLUTION INTRAVENOUS CONTINUOUS
Status: DISCONTINUED | OUTPATIENT
Start: 2018-10-24 | End: 2018-10-26 | Stop reason: HOSPADM

## 2018-10-24 RX ORDER — ACETAMINOPHEN 500 MG
1000 TABLET ORAL EVERY 8 HOURS
Status: DISCONTINUED | OUTPATIENT
Start: 2018-10-24 | End: 2018-10-26 | Stop reason: HOSPADM

## 2018-10-24 RX ORDER — BISACODYL 10 MG
10 SUPPOSITORY, RECTAL RECTAL DAILY PRN
Status: DISCONTINUED | OUTPATIENT
Start: 2018-10-24 | End: 2018-10-26 | Stop reason: HOSPADM

## 2018-10-24 RX ORDER — 0.9 % SODIUM CHLORIDE 0.9 %
500 INTRAVENOUS SOLUTION INTRAVENOUS PRN
Status: DISCONTINUED | OUTPATIENT
Start: 2018-10-24 | End: 2018-10-26 | Stop reason: HOSPADM

## 2018-10-24 RX ORDER — UBIDECARENONE 100 MG
200 CAPSULE ORAL DAILY
Status: DISCONTINUED | OUTPATIENT
Start: 2018-10-24 | End: 2018-10-26 | Stop reason: HOSPADM

## 2018-10-24 RX ORDER — SODIUM CHLORIDE, SODIUM LACTATE, POTASSIUM CHLORIDE, CALCIUM CHLORIDE 600; 310; 30; 20 MG/100ML; MG/100ML; MG/100ML; MG/100ML
INJECTION, SOLUTION INTRAVENOUS CONTINUOUS
Status: DISCONTINUED | OUTPATIENT
Start: 2018-10-24 | End: 2018-10-26 | Stop reason: HOSPADM

## 2018-10-24 RX ORDER — OXYCODONE HYDROCHLORIDE 5 MG/1
5 TABLET ORAL EVERY 4 HOURS PRN
Status: DISCONTINUED | OUTPATIENT
Start: 2018-10-24 | End: 2018-10-26 | Stop reason: HOSPADM

## 2018-10-24 RX ORDER — DEXAMETHASONE SODIUM PHOSPHATE 10 MG/ML
10 INJECTION INTRAMUSCULAR; INTRAVENOUS ONCE
Status: DISCONTINUED | OUTPATIENT
Start: 2018-10-24 | End: 2018-10-26 | Stop reason: HOSPADM

## 2018-10-24 RX ORDER — DIPHENHYDRAMINE HCL 25 MG
25 TABLET ORAL EVERY 6 HOURS PRN
Status: DISCONTINUED | OUTPATIENT
Start: 2018-10-24 | End: 2018-10-26 | Stop reason: HOSPADM

## 2018-10-24 RX ORDER — ENALAPRILAT 2.5 MG/2ML
1.25 INJECTION INTRAVENOUS
Status: DISCONTINUED | OUTPATIENT
Start: 2018-10-24 | End: 2018-10-24 | Stop reason: HOSPADM

## 2018-10-24 RX ORDER — SODIUM CHLORIDE, SODIUM LACTATE, POTASSIUM CHLORIDE, CALCIUM CHLORIDE 600; 310; 30; 20 MG/100ML; MG/100ML; MG/100ML; MG/100ML
INJECTION, SOLUTION INTRAVENOUS CONTINUOUS
Status: DISCONTINUED | OUTPATIENT
Start: 2018-10-24 | End: 2018-10-24

## 2018-10-24 RX ORDER — METOCLOPRAMIDE HYDROCHLORIDE 5 MG/ML
10 INJECTION INTRAMUSCULAR; INTRAVENOUS
Status: DISCONTINUED | OUTPATIENT
Start: 2018-10-24 | End: 2018-10-24 | Stop reason: HOSPADM

## 2018-10-24 RX ORDER — DIPHENHYDRAMINE HYDROCHLORIDE 50 MG/ML
12.5 INJECTION INTRAMUSCULAR; INTRAVENOUS
Status: DISCONTINUED | OUTPATIENT
Start: 2018-10-24 | End: 2018-10-24 | Stop reason: HOSPADM

## 2018-10-24 RX ORDER — TAMSULOSIN HYDROCHLORIDE 0.4 MG/1
0.4 CAPSULE ORAL DAILY
Status: DISCONTINUED | OUTPATIENT
Start: 2018-10-24 | End: 2018-10-26 | Stop reason: HOSPADM

## 2018-10-24 RX ADMIN — MIDAZOLAM HYDROCHLORIDE 2 MG: 1 INJECTION, SOLUTION INTRAMUSCULAR; INTRAVENOUS at 08:50

## 2018-10-24 RX ADMIN — SUCCINYLCHOLINE CHLORIDE 200 MG: 20 INJECTION, SOLUTION INTRAMUSCULAR; INTRAVENOUS; PARENTERAL at 09:51

## 2018-10-24 RX ADMIN — Medication 200 MG: at 15:01

## 2018-10-24 RX ADMIN — DOCUSATE SODIUM 100 MG: 100 CAPSULE, LIQUID FILLED ORAL at 15:01

## 2018-10-24 RX ADMIN — PROPOFOL 50 MG: 10 INJECTION, EMULSION INTRAVENOUS at 10:22

## 2018-10-24 RX ADMIN — ACETAMINOPHEN 1000 MG: 500 TABLET, FILM COATED ORAL at 07:21

## 2018-10-24 RX ADMIN — ACETAMINOPHEN 1000 MG: 500 TABLET, FILM COATED ORAL at 23:51

## 2018-10-24 RX ADMIN — EPHEDRINE SULFATE 15 MG: 50 INJECTION, SOLUTION INTRAMUSCULAR; INTRAVENOUS; SUBCUTANEOUS at 11:09

## 2018-10-24 RX ADMIN — FENTANYL CITRATE 100 MCG: 50 INJECTION INTRAMUSCULAR; INTRAVENOUS at 10:28

## 2018-10-24 RX ADMIN — LIDOCAINE HYDROCHLORIDE 3 ML: 10 INJECTION, SOLUTION EPIDURAL; INFILTRATION; INTRACAUDAL; PERINEURAL at 09:19

## 2018-10-24 RX ADMIN — LIDOCAINE HYDROCHLORIDE 3 ML: 10 INJECTION, SOLUTION EPIDURAL; INFILTRATION; INTRACAUDAL; PERINEURAL at 09:30

## 2018-10-24 RX ADMIN — ONDANSETRON HYDROCHLORIDE 4 MG: 2 INJECTION, SOLUTION INTRAMUSCULAR; INTRAVENOUS at 11:38

## 2018-10-24 RX ADMIN — ROCURONIUM BROMIDE 30 MG: 10 INJECTION INTRAVENOUS at 10:23

## 2018-10-24 RX ADMIN — RIVAROXABAN 10 MG: 10 TABLET, FILM COATED ORAL at 20:59

## 2018-10-24 RX ADMIN — LIDOCAINE HYDROCHLORIDE 2 ML: 10 INJECTION, SOLUTION EPIDURAL; INFILTRATION; INTRACAUDAL; PERINEURAL at 09:35

## 2018-10-24 RX ADMIN — TRAMADOL HYDROCHLORIDE 50 MG: 50 TABLET, FILM COATED ORAL at 20:51

## 2018-10-24 RX ADMIN — DEXAMETHASONE SODIUM PHOSPHATE 10 MG: 10 INJECTION INTRAMUSCULAR; INTRAVENOUS at 09:55

## 2018-10-24 RX ADMIN — Medication 2 G: at 09:55

## 2018-10-24 RX ADMIN — EPHEDRINE SULFATE 10 MG: 50 INJECTION, SOLUTION INTRAMUSCULAR; INTRAVENOUS; SUBCUTANEOUS at 11:37

## 2018-10-24 RX ADMIN — FENTANYL CITRATE 50 MCG: 50 INJECTION INTRAMUSCULAR; INTRAVENOUS at 09:15

## 2018-10-24 RX ADMIN — EPHEDRINE SULFATE 10 MG: 50 INJECTION, SOLUTION INTRAMUSCULAR; INTRAVENOUS; SUBCUTANEOUS at 10:57

## 2018-10-24 RX ADMIN — CLINDAMYCIN PHOSPHATE 900 MG: 900 INJECTION, SOLUTION INTRAVENOUS at 23:15

## 2018-10-24 RX ADMIN — SODIUM CHLORIDE, SODIUM LACTATE, POTASSIUM CHLORIDE, AND CALCIUM CHLORIDE: 600; 310; 30; 20 INJECTION, SOLUTION INTRAVENOUS at 10:29

## 2018-10-24 RX ADMIN — ROCURONIUM BROMIDE 10 MG: 10 INJECTION INTRAVENOUS at 09:51

## 2018-10-24 RX ADMIN — TRANEXAMIC ACID 1000 MG: 100 INJECTION, SOLUTION INTRAVENOUS at 09:55

## 2018-10-24 RX ADMIN — CLINDAMYCIN PHOSPHATE 900 MG: 900 INJECTION, SOLUTION INTRAVENOUS at 15:01

## 2018-10-24 RX ADMIN — ROPIVACAINE HYDROCHLORIDE 20 ML: 5 INJECTION, SOLUTION EPIDURAL; INFILTRATION; PERINEURAL at 08:51

## 2018-10-24 RX ADMIN — CEFAZOLIN SODIUM 3 G: 10 INJECTION, POWDER, FOR SOLUTION INTRAVENOUS at 17:47

## 2018-10-24 RX ADMIN — TRAMADOL HYDROCHLORIDE 50 MG: 50 TABLET, FILM COATED ORAL at 15:01

## 2018-10-24 RX ADMIN — PROPOFOL 200 MG: 10 INJECTION, EMULSION INTRAVENOUS at 09:51

## 2018-10-24 RX ADMIN — FENTANYL CITRATE 50 MCG: 50 INJECTION INTRAMUSCULAR; INTRAVENOUS at 09:51

## 2018-10-24 RX ADMIN — CELECOXIB 200 MG: 200 CAPSULE ORAL at 07:21

## 2018-10-24 RX ADMIN — PREGABALIN 75 MG: 75 CAPSULE ORAL at 07:21

## 2018-10-24 RX ADMIN — ACETAMINOPHEN 1000 MG: 500 TABLET, FILM COATED ORAL at 15:01

## 2018-10-24 RX ADMIN — OXYCODONE HYDROCHLORIDE 10 MG: 10 TABLET, FILM COATED, EXTENDED RELEASE ORAL at 07:22

## 2018-10-24 RX ADMIN — OXYCODONE HYDROCHLORIDE 10 MG: 5 TABLET ORAL at 20:59

## 2018-10-24 RX ADMIN — SUGAMMADEX 250 MG: 100 INJECTION, SOLUTION INTRAVENOUS at 12:12

## 2018-10-24 RX ADMIN — DOCUSATE SODIUM 100 MG: 100 CAPSULE, LIQUID FILLED ORAL at 20:51

## 2018-10-24 RX ADMIN — SODIUM CHLORIDE, SODIUM LACTATE, POTASSIUM CHLORIDE, AND CALCIUM CHLORIDE: 600; 310; 30; 20 INJECTION, SOLUTION INTRAVENOUS at 07:22

## 2018-10-24 RX ADMIN — ROCURONIUM BROMIDE 40 MG: 10 INJECTION INTRAVENOUS at 10:06

## 2018-10-24 RX ADMIN — LIDOCAINE HYDROCHLORIDE 2 ML: 10 INJECTION, SOLUTION EPIDURAL; INFILTRATION; INTRACAUDAL; PERINEURAL at 09:24

## 2018-10-24 RX ADMIN — Medication 50 MG: at 10:54

## 2018-10-24 RX ADMIN — Medication 10 ML: at 21:01

## 2018-10-24 RX ADMIN — TRANEXAMIC ACID 1000 MG: 100 INJECTION, SOLUTION INTRAVENOUS at 11:35

## 2018-10-24 RX ADMIN — OXYCODONE HYDROCHLORIDE 10 MG: 10 TABLET, FILM COATED, EXTENDED RELEASE ORAL at 20:51

## 2018-10-24 ASSESSMENT — PAIN SCALES - GENERAL
PAINLEVEL_OUTOF10: 7
PAINLEVEL_OUTOF10: 0
PAINLEVEL_OUTOF10: 0
PAINLEVEL_OUTOF10: 4

## 2018-10-24 ASSESSMENT — LIFESTYLE VARIABLES: SMOKING_STATUS: 0

## 2018-10-24 NOTE — PROGRESS NOTES
wrapped with ace bandage, IV  Standing Balance  Sit to stand: Contact guard assistance  ADL  Feeding: Independent  Grooming: Independent  UE Bathing: Supervision  LE Bathing: Supervision  UE Dressing: Supervision  LE Dressing: Minimal assistance  Toileting: Contact guard assistance           Transfers  Stand Step Transfers: Contact guard assistance  Sit to stand: Contact guard assistance  Transfer Comments: with r.w. Cognition  Overall Cognitive Status: WNL        Sensation  Overall Sensation Status:  (LLE feels funny per pt.)        LUE AROM (degrees)  LUE AROM : WNL  RUE AROM (degrees)  RUE AROM : WNL  LUE Strength  Gross LUE Strength: WNL  RUE Strength  Gross RUE Strength: WNL                  Assessment   Performance deficits / Impairments: Decreased ADL status; Decreased functional mobility   Assessment: Pt. doing well with knee ROM and transfers. Should progress well. Treatment Diagnosis: L TKR  Prognosis: Good  Decision Making: Low Complexity  REQUIRES OT FOLLOW UP: Yes  Activity Tolerance  Activity Tolerance: Patient Tolerated treatment well         Plan   Plan  Times per week: 3-5x/week  Times per day: Daily    G-Code  OT G-codes  Functional Assessment Tool Used: ADLs, transfers  Score: CJ  Functional Limitation: Self care  Self Care Current Status (): At least 20 percent but less than 40 percent impaired, limited or restricted  Self Care Goal Status (): At least 1 percent but less than 20 percent impaired, limited or restricted  OutComes Score                                           AM-PAC Score             Goals  Short term goals  Time Frame for Short term goals: 1 week  Short term goal 1: Supervision with seated LB dsg  Short term goal 2: Supervision with toilet tfers  Short term goal 3: Supervision with clothing mgmt.  in standing  Long term goals  Long term goal 1: Return to PLOF       Therapy Time   Individual Concurrent Group Co-treatment   Time In           Time Out           Minutes

## 2018-10-24 NOTE — ANESTHESIA PROCEDURE NOTES
Peripheral Block    Patient location during procedure: holding area  Start time: 10/24/2018 8:53 AM  End time: 10/24/2018 8:53 AM  Staffing  Anesthesiologist: Nikole Herzog  Performed: anesthesiologist   Preanesthetic Checklist  Completed: patient identified, site marked, surgical consent, pre-op evaluation, timeout performed, IV checked, risks and benefits discussed, monitors and equipment checked, anesthesia consent given, oxygen available and patient being monitored  Peripheral Block  Patient position: supine  Prep: ChloraPrep  Patient monitoring: cardiac monitor, continuous pulse ox, frequent blood pressure checks and IV access  Block type: Femoral  Laterality: left  Injection technique: single-shot  Procedures: ultrasound guided  Local infiltration: lidocaine  Infiltration strength: 1 %  Dose: 3 mL  Adductor canal  Provider prep: sterile gloves  Local infiltration: lidocaine  Needle  Needle type: combined needle/nerve stimulator   Needle gauge: 21 G  Needle length: 10 cm  Needle localization: ultrasound guidance  Assessment  Injection assessment: negative aspiration for heme, no paresthesia on injection and local visualized surrounding nerve on ultrasound  Paresthesia pain: none  Slow fractionated injection: yes  Hemodynamics: stable  Additional Notes  Under ultrasound (\"US\") guidance, a 21 gauge needle was inserted and placed in close proximity to the femoral nerve. Ultrasound was also used to visualize the spread of the anesthetic in close proximity to the nerve being blocked. The nerve appeared anatomically normal, and there were no abnormal pathological findings. A permanent image was recorded. Emergency resuscitation equipment and lipid emulsion readily available.     Reason for block: post-op pain management
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
## 2018-10-24 NOTE — PROGRESS NOTES
Physical Therapy    Facility/Department: [facility] SURG SERVICES  Initial Assessment    NAME: Adair Hannha  : 1955  MRN: [id number]    Date of Service: 10/24/2018    Discharge Recommendations:  Continue to assess pending progress, Patient would benefit from continued therapy after discharge        Patient Diagnosis(es): There were no encounter diagnoses. has a past medical history of Arthritis; Head injury; Heel spur; HTN (hypertension); Knee pain; and Seasonal allergies. has a past surgical history that includes Knee arthroscopy; Colonoscopy (2007); and pr total knee arthroplasty (Left, 10/24/2018). Restrictions  Restrictions/Precautions  Restrictions/Precautions: Weight Bearing, Fall Risk  Required Braces or Orthoses?: No  Lower Extremity Weight Bearing Restrictions  Left Lower Extremity Weight Bearing: Weight Bearing As Tolerated  Vision/Hearing  Vision: Within Functional Limits  Hearing: Within functional limits     Subjective  General  Chart Reviewed: Yes  Patient assessed for rehabilitation services?: Yes  Response To Previous Treatment: Not applicable  Family / Caregiver Present: Yes (wife and daughter of pt. present)  Referring Practitioner: Dr. Fabián Quintanilla  Referral Date : 10/24/18  Diagnosis: L knee OA  Follows Commands: Within Functional Limits  General Comment  Comments: Complex primary knee total arthoplasty L 10/24/18  Subjective  Subjective: Pt. states he would be fine getting up to chair, leg feels funny.   Pain Screening  Patient Currently in Pain: Denies  Vital Signs  Patient Currently in Pain: Denies  Pre Treatment Pain Screening  Intervention List: Patient able to continue with treatment    Orientation  Orientation  Overall Orientation Status: Within Functional Limits    Social/Functional History  Social/Functional History  Lives With: Spouse  Type of Home: House  Home Layout: One level  Home Access: Stairs to enter without rails  Entrance Stairs - Number of Steps: 2  Bathroom Shower/Tub: LOS would be 10-14 days. Treatment Diagnosis: impaired gait and mobility  Prognosis: Excellent  Decision Making: Low Complexity  Patient Education: purpose of PT, safety, fall risk, staff A, FWBAT LLE, RW use, call bell use  Barriers to Learning: none noted  REQUIRES PT FOLLOW UP: Yes  Activity Tolerance  Activity Tolerance: Patient Tolerated treatment well         Plan   Plan  Times per week: 6-7  Times per day: Twice a day  Plan weeks: 2  Current Treatment Recommendations: Strengthening, ROM, Balance Training, Functional Mobility Training, Transfer Training, Gait Training, Pain Management, Safety Education & Training, Equipment Evaluation, Education, & procurement, Patient/Caregiver Education & Training, Positioning  Plan Comment: cont. PT per POC. Safety Devices  Type of devices: Call light within reach, Gait belt, Patient at risk for falls, Left in chair, Nurse notified (reclined, cold pack on L knee. RN, Rizwana Khan, aware pt. up in the chair.)    G-Code  PT G-Codes  Functional Assessment Tool Used: amb. Score: Min A  Functional Limitation: Mobility: Walking and moving around  Mobility: Walking and Moving Around Current Status (): At least 40 percent but less than 60 percent impaired, limited or restricted  Mobility: Walking and Moving Around Goal Status ():  At least 1 percent but less than 20 percent impaired, limited or restricted  OutComes Score                                           AM-PAC Score             Goals  Short term goals  Time Frame for Short term goals: 2 wks  Short term goal 1: supine to sit indep  Short term goal 2: sit to stand indep  Short term goal 3: amb. 120' with RW SBA  Short term goal 4: up/down 3-5 stairs with RW SBA  Patient Goals   Patient goals : get stonger, go home       Therapy Time   Individual Concurrent Group Co-treatment   Time In           Time Out           Minutes                   Hillary Mirza, ANGE    Electronically signed by Hillary Mirza PT on

## 2018-10-25 LAB
ANION GAP SERPL CALCULATED.3IONS-SCNC: 12 MMOL/L (ref 7–19)
BUN BLDV-MCNC: 16 MG/DL (ref 8–23)
CALCIUM SERPL-MCNC: 8.4 MG/DL (ref 8.8–10.2)
CHLORIDE BLD-SCNC: 100 MMOL/L (ref 98–111)
CO2: 26 MMOL/L (ref 22–29)
CREAT SERPL-MCNC: 0.9 MG/DL (ref 0.5–1.2)
GFR NON-AFRICAN AMERICAN: >60
GLUCOSE BLD-MCNC: 147 MG/DL (ref 74–109)
HCT VFR BLD CALC: 38.2 % (ref 42–52)
HEMOGLOBIN: 12.4 G/DL (ref 14–18)
POTASSIUM REFLEX MAGNESIUM: 4.4 MMOL/L (ref 3.5–5)
SODIUM BLD-SCNC: 138 MMOL/L (ref 136–145)

## 2018-10-25 PROCEDURE — 85014 HEMATOCRIT: CPT

## 2018-10-25 PROCEDURE — 97116 GAIT TRAINING THERAPY: CPT

## 2018-10-25 PROCEDURE — 6360000002 HC RX W HCPCS: Performed by: ORTHOPAEDIC SURGERY

## 2018-10-25 PROCEDURE — 2580000003 HC RX 258: Performed by: ANESTHESIOLOGY

## 2018-10-25 PROCEDURE — 1210000000 HC MED SURG R&B

## 2018-10-25 PROCEDURE — 85018 HEMOGLOBIN: CPT

## 2018-10-25 PROCEDURE — 6370000000 HC RX 637 (ALT 250 FOR IP): Performed by: ORTHOPAEDIC SURGERY

## 2018-10-25 PROCEDURE — 36415 COLL VENOUS BLD VENIPUNCTURE: CPT

## 2018-10-25 PROCEDURE — 2580000003 HC RX 258: Performed by: ORTHOPAEDIC SURGERY

## 2018-10-25 PROCEDURE — 2500000003 HC RX 250 WO HCPCS: Performed by: ORTHOPAEDIC SURGERY

## 2018-10-25 PROCEDURE — 6370000000 HC RX 637 (ALT 250 FOR IP): Performed by: FAMILY MEDICINE

## 2018-10-25 PROCEDURE — 80048 BASIC METABOLIC PNL TOTAL CA: CPT

## 2018-10-25 RX ORDER — AMLODIPINE BESYLATE 10 MG/1
10 TABLET ORAL NIGHTLY
Status: DISCONTINUED | OUTPATIENT
Start: 2018-10-25 | End: 2018-10-26 | Stop reason: HOSPADM

## 2018-10-25 RX ORDER — CLONIDINE HYDROCHLORIDE 0.1 MG/1
0.1 TABLET ORAL EVERY 4 HOURS PRN
Status: DISCONTINUED | OUTPATIENT
Start: 2018-10-25 | End: 2018-10-26 | Stop reason: HOSPADM

## 2018-10-25 RX ORDER — LOSARTAN POTASSIUM 100 MG/1
100 TABLET ORAL DAILY
Status: DISCONTINUED | OUTPATIENT
Start: 2018-10-25 | End: 2018-10-26 | Stop reason: HOSPADM

## 2018-10-25 RX ADMIN — OXYCODONE HYDROCHLORIDE 20 MG: 5 TABLET ORAL at 13:55

## 2018-10-25 RX ADMIN — OXYCODONE HYDROCHLORIDE 20 MG: 5 TABLET ORAL at 06:34

## 2018-10-25 RX ADMIN — OXYCODONE HYDROCHLORIDE 10 MG: 10 TABLET, FILM COATED, EXTENDED RELEASE ORAL at 07:39

## 2018-10-25 RX ADMIN — CLINDAMYCIN PHOSPHATE 900 MG: 900 INJECTION, SOLUTION INTRAVENOUS at 21:57

## 2018-10-25 RX ADMIN — AMLODIPINE BESYLATE 10 MG: 10 TABLET ORAL at 20:45

## 2018-10-25 RX ADMIN — DOCUSATE SODIUM 100 MG: 100 CAPSULE, LIQUID FILLED ORAL at 07:39

## 2018-10-25 RX ADMIN — DOCUSATE SODIUM 100 MG: 100 CAPSULE, LIQUID FILLED ORAL at 20:45

## 2018-10-25 RX ADMIN — ACETAMINOPHEN 1000 MG: 500 TABLET, FILM COATED ORAL at 07:38

## 2018-10-25 RX ADMIN — OXYCODONE HYDROCHLORIDE 10 MG: 10 TABLET, FILM COATED, EXTENDED RELEASE ORAL at 20:46

## 2018-10-25 RX ADMIN — TRAMADOL HYDROCHLORIDE 50 MG: 50 TABLET, FILM COATED ORAL at 07:39

## 2018-10-25 RX ADMIN — SODIUM CHLORIDE: 9 INJECTION, SOLUTION INTRAVENOUS at 04:21

## 2018-10-25 RX ADMIN — ACETAMINOPHEN 1000 MG: 500 TABLET, FILM COATED ORAL at 17:19

## 2018-10-25 RX ADMIN — CLINDAMYCIN PHOSPHATE 900 MG: 900 INJECTION, SOLUTION INTRAVENOUS at 06:35

## 2018-10-25 RX ADMIN — TRAMADOL HYDROCHLORIDE 50 MG: 50 TABLET, FILM COATED ORAL at 20:45

## 2018-10-25 RX ADMIN — Medication 200 MG: at 07:39

## 2018-10-25 RX ADMIN — LOSARTAN POTASSIUM 100 MG: 100 TABLET ORAL at 07:39

## 2018-10-25 RX ADMIN — OXYCODONE HYDROCHLORIDE 20 MG: 5 TABLET ORAL at 21:55

## 2018-10-25 RX ADMIN — CLINDAMYCIN PHOSPHATE 900 MG: 900 INJECTION, SOLUTION INTRAVENOUS at 14:00

## 2018-10-25 RX ADMIN — RIVAROXABAN 10 MG: 10 TABLET, FILM COATED ORAL at 17:19

## 2018-10-25 RX ADMIN — TRAMADOL HYDROCHLORIDE 50 MG: 50 TABLET, FILM COATED ORAL at 02:15

## 2018-10-25 RX ADMIN — Medication 10 ML: at 22:10

## 2018-10-25 RX ADMIN — SODIUM CHLORIDE, POTASSIUM CHLORIDE, SODIUM LACTATE AND CALCIUM CHLORIDE: 600; 310; 30; 20 INJECTION, SOLUTION INTRAVENOUS at 02:19

## 2018-10-25 RX ADMIN — CEFAZOLIN SODIUM 3 G: 10 INJECTION, POWDER, FOR SOLUTION INTRAVENOUS at 02:15

## 2018-10-25 RX ADMIN — Medication 10 ML: at 07:42

## 2018-10-25 RX ADMIN — OXYCODONE HYDROCHLORIDE 20 MG: 5 TABLET ORAL at 18:36

## 2018-10-25 ASSESSMENT — PAIN SCALES - GENERAL
PAINLEVEL_OUTOF10: 8
PAINLEVEL_OUTOF10: 2
PAINLEVEL_OUTOF10: 7
PAINLEVEL_OUTOF10: 1
PAINLEVEL_OUTOF10: 1
PAINLEVEL_OUTOF10: 5
PAINLEVEL_OUTOF10: 5
PAINLEVEL_OUTOF10: 1
PAINLEVEL_OUTOF10: 5
PAINLEVEL_OUTOF10: 2
PAINLEVEL_OUTOF10: 0
PAINLEVEL_OUTOF10: 0
PAINLEVEL_OUTOF10: 6
PAINLEVEL_OUTOF10: 7
PAINLEVEL_OUTOF10: 1

## 2018-10-25 NOTE — CARE COORDINATION
Patient attended [facility] Class ON 9/25/2018.   Electronically signed by Tino Figueroa RN on 10/25/2018 at 12:43 PM

## 2018-10-25 NOTE — CARE COORDINATION
Cindy Rogel, RN  P# [phone]    Patient would like dme item to be delivered to room #539. Please call if you have any questions.   Patient Information   Patient Name  Monik Michaels ([id number]) Sex  Male   1955   Room Bed   0539 [id number]   Patient Ethnicity & Race   Ethnic Group Patient Race   Non-/Non  [place]   Patient Demographics   Address  [address] [place] [address] Phone  [phone] [facility])  [phone] (Work)  [phone] (Mobile)   PCP and [facility]   Documents on File      Status Date Received Description   Documents for the Patient   H&P   2006 OV- JACINTA Harper   Operative Report   2007 Colon w/ pictures- Dr. Kane Reddy   6-3-2016 RETURN MAIL   (OLD) [facility]) Physician Consent and NPP Not Received     Photo ID Received 17    Insurance Card      Physician Office Consent for Treatment Not Received     Advance Directives and Living Will Not Received     Power of [address] [place] [address] Not Received     Financial Responsibility Form Not Received     Financial Assistance Program Applications Not Received     [employer] Experience Adult Proxy Access Form Not Received     [employer] Child Proxy Access Form Not Received     ACO Consent for the Release of  Confidential Alcohol or Drug Treatment Information Not Received     ACO Declining to [address] Not Received     [address] Physician Communication Release NPP Signed 17    [facility]) Physician Consent and NPP Signed 17    Insurance Card Received 17    Miscellaneous Received 17 CT authorization   Form Received 17 11.17 GI SCHED FORM=NS   Advance Directive Assessment Received 10/09/18    Advance Directives and Living Will Received 10/24/18    Advance Directives and Living Will Received 10/24/18    Advance Directive Assessment Received 10/24/18    Documents for the [facility] Consent Treat/HIPAA Received 10/09/18    IMM Medicare Not Received     IMM - Medicare Second Copy Given to Pt      Observation Notification Not Received     Hospital Consent Treat/HIPAA      [facility] Consent Treat/HIPAA Received 10/24/18    Coverage COB Information Received 10/24/18    Hospital Consent Treat/HIPAA Received 10/24/18    Coverage COB Information Received 10/24/18    Admission Information   Attending Provider Admitting Provider Admission Type Admission Date/Time   MD Se Parada MD Elective 10/24/18  0654   Discharge Date Hospital Service Auth/Cert Status Service Area    Med/Surg [facility] ([facility])   Unit Room/Bed Admission Status      [facility] 5 SURG SERVICES [id number] Admission (Confirmed)    Admission   Complaint   [facility] Account   Name Acct ID Class Status Primary Coverage   Earnie [employer] [id number] [address]          Guarantor Account (for Hospital Account [de-identified])   Name Relation to Pt Service Area Active?  Acct Type   Earnie American Self [address] Yes Personal/Family   Address Phone         [address] [facility] [phone](I)  [phone](E)            Coverage Information (for Hospital Account [de-identified])   F/O Payor/Plan Precert #   BCBS/BCBS -  [address] #   Earnie American [id number]   Address Phone   [address]  [place], [address]           Medical Problems   Comment      Last edited by  on  at    [address] [facility] Problem List   Date Reviewed: 11/13/2017            ICD-10-CM Priority Class Noted POA   Arthritis of knee M17.10   10/24/2018 Yes      Non-Hospital Problem List   Date Reviewed: 11/13/2017   None        Electronically signed by Mirna Garber RN on 10/25/2018 at 8:18 AM

## 2018-10-26 ENCOUNTER — APPOINTMENT (OUTPATIENT)
Dept: GENERAL RADIOLOGY | Age: 63
DRG: 470 | End: 2018-10-26
Attending: ORTHOPAEDIC SURGERY
Payer: COMMERCIAL

## 2018-10-26 VITALS
RESPIRATION RATE: 16 BRPM | TEMPERATURE: 98.8 F | DIASTOLIC BLOOD PRESSURE: 68 MMHG | HEIGHT: 72 IN | OXYGEN SATURATION: 90 % | SYSTOLIC BLOOD PRESSURE: 134 MMHG | BODY MASS INDEX: 36.57 KG/M2 | WEIGHT: 270 LBS | HEART RATE: 74 BPM

## 2018-10-26 LAB
ANION GAP SERPL CALCULATED.3IONS-SCNC: 10 MMOL/L (ref 7–19)
BILIRUBIN URINE: NEGATIVE
BLOOD, URINE: NEGATIVE
BUN BLDV-MCNC: 17 MG/DL (ref 8–23)
CALCIUM SERPL-MCNC: 8.3 MG/DL (ref 8.8–10.2)
CHLORIDE BLD-SCNC: 101 MMOL/L (ref 98–111)
CLARITY: CLEAR
CO2: 27 MMOL/L (ref 22–29)
COLOR: YELLOW
CREAT SERPL-MCNC: 0.9 MG/DL (ref 0.5–1.2)
GFR NON-AFRICAN AMERICAN: >60
GLUCOSE BLD-MCNC: 134 MG/DL (ref 74–109)
GLUCOSE URINE: NEGATIVE MG/DL
HCT VFR BLD CALC: 35.4 % (ref 42–52)
HEMOGLOBIN: 11.6 G/DL (ref 14–18)
KETONES, URINE: NEGATIVE MG/DL
LEUKOCYTE ESTERASE, URINE: NEGATIVE
NITRITE, URINE: NEGATIVE
PH UA: 6.5
POTASSIUM REFLEX MAGNESIUM: 4.2 MMOL/L (ref 3.5–5)
PROTEIN UA: NEGATIVE MG/DL
SODIUM BLD-SCNC: 138 MMOL/L (ref 136–145)
SPECIFIC GRAVITY UA: 1.01
URINE REFLEX TO CULTURE: NORMAL
UROBILINOGEN, URINE: 1 E.U./DL

## 2018-10-26 PROCEDURE — 97535 SELF CARE MNGMENT TRAINING: CPT

## 2018-10-26 PROCEDURE — 85014 HEMATOCRIT: CPT

## 2018-10-26 PROCEDURE — 6370000000 HC RX 637 (ALT 250 FOR IP): Performed by: ORTHOPAEDIC SURGERY

## 2018-10-26 PROCEDURE — 85018 HEMOGLOBIN: CPT

## 2018-10-26 PROCEDURE — 2500000003 HC RX 250 WO HCPCS: Performed by: ORTHOPAEDIC SURGERY

## 2018-10-26 PROCEDURE — 80048 BASIC METABOLIC PNL TOTAL CA: CPT

## 2018-10-26 PROCEDURE — 97116 GAIT TRAINING THERAPY: CPT

## 2018-10-26 PROCEDURE — 2580000003 HC RX 258: Performed by: ORTHOPAEDIC SURGERY

## 2018-10-26 PROCEDURE — 71046 X-RAY EXAM CHEST 2 VIEWS: CPT

## 2018-10-26 PROCEDURE — 36415 COLL VENOUS BLD VENIPUNCTURE: CPT

## 2018-10-26 PROCEDURE — 81003 URINALYSIS AUTO W/O SCOPE: CPT

## 2018-10-26 RX ORDER — OXYCODONE HYDROCHLORIDE 10 MG/1
10 TABLET ORAL SEE ADMIN INSTRUCTIONS
Qty: 90 TABLET | Refills: 0 | Status: SHIPPED | OUTPATIENT
Start: 2018-10-26 | End: 2018-11-26

## 2018-10-26 RX ADMIN — OXYCODONE HYDROCHLORIDE 20 MG: 5 TABLET ORAL at 04:31

## 2018-10-26 RX ADMIN — ACETAMINOPHEN 1000 MG: 500 TABLET, FILM COATED ORAL at 00:00

## 2018-10-26 RX ADMIN — DOCUSATE SODIUM 100 MG: 100 CAPSULE, LIQUID FILLED ORAL at 07:38

## 2018-10-26 RX ADMIN — Medication 10 ML: at 07:39

## 2018-10-26 RX ADMIN — TRAMADOL HYDROCHLORIDE 50 MG: 50 TABLET, FILM COATED ORAL at 07:38

## 2018-10-26 RX ADMIN — OXYCODONE HYDROCHLORIDE 20 MG: 5 TABLET ORAL at 12:07

## 2018-10-26 RX ADMIN — CLINDAMYCIN PHOSPHATE 900 MG: 900 INJECTION, SOLUTION INTRAVENOUS at 06:20

## 2018-10-26 RX ADMIN — OXYCODONE HYDROCHLORIDE 10 MG: 10 TABLET, FILM COATED, EXTENDED RELEASE ORAL at 07:38

## 2018-10-26 RX ADMIN — Medication 200 MG: at 07:38

## 2018-10-26 RX ADMIN — ACETAMINOPHEN 1000 MG: 500 TABLET, FILM COATED ORAL at 04:31

## 2018-10-26 ASSESSMENT — PAIN SCALES - GENERAL
PAINLEVEL_OUTOF10: 0
PAINLEVEL_OUTOF10: 4
PAINLEVEL_OUTOF10: 5
PAINLEVEL_OUTOF10: 0
PAINLEVEL_OUTOF10: 7
PAINLEVEL_OUTOF10: 0
PAINLEVEL_OUTOF10: 5
PAINLEVEL_OUTOF10: 5
PAINLEVEL_OUTOF10: 0

## 2018-10-26 ASSESSMENT — PAIN DESCRIPTION - ORIENTATION: ORIENTATION: LEFT

## 2018-10-26 ASSESSMENT — PAIN DESCRIPTION - LOCATION: LOCATION: KNEE

## 2018-10-26 ASSESSMENT — PAIN DESCRIPTION - PAIN TYPE: TYPE: SURGICAL PAIN

## 2018-10-26 ASSESSMENT — PAIN DESCRIPTION - DESCRIPTORS: DESCRIPTORS: ACHING

## 2018-10-26 ASSESSMENT — PAIN SCALES - WONG BAKER: WONGBAKER_NUMERICALRESPONSE: 2

## 2018-10-26 NOTE — PROGRESS NOTES
Subjective:     Post-Operative Day: 2 Status Post left tka complex  Systemic or Specific Complaints:No Complaints  no nausea Pain 2    Objective:     Patient Vitals for the past 24 hrs:   BP Temp Temp src Pulse Resp SpO2   10/26/18 1053 134/68 98.8 °F (37.1 °C) Temporal 74 16 90 %   10/26/18 0620 106/64 99.9 °F (37.7 °C) Oral 70 16 90 %   10/26/18 0415 130/74 100.3 °F (37.9 °C) Oral 71 17 91 %   10/25/18 2344 - 101.6 °F (38.7 °C) Oral - - -   10/25/18 2238 (!) 187/82 102.5 °F (39.2 °C) Oral 89 17 91 %   10/25/18 2120 - 100.6 °F (38.1 °C) Oral - - -   10/25/18 1840 (!) 169/74 99.9 °F (37.7 °C) Temporal 74 17 92 %   10/25/18 1428 (!) 142/73 99.2 °F (37.3 °C) Temporal 69 16 92 %       General: alert, appears stated age and cooperative   Exam: Incision clean, dry, and intact, no evidence of infection. Neurovascular: Exam normal       Data Review:  Recent Labs      10/25/18   0135  10/26/18   0151   HGB  12.4*  11.6*     Recent Labs      10/26/18   0151   NA  138   K  4.2   CREATININE  0.9     Recent Labs      10/26/18   0151   LABGLOM  >60           Assessment:     Status Post left tka complex. Doing well postoperatively.      Plan:    DC home          Electronically signed by Ralph Frias MD on 10/26/2018 at 12:53 PM

## 2018-10-26 NOTE — CARE COORDINATION
[facility] notified of patient discharge today. DC Summary and DC med list faxed.   Electronically signed by Nadja Hoyos RN on 10/26/18 at 1:25 PM

## 2018-10-26 NOTE — PROGRESS NOTES
Progress Note  10/26/2018 7:28 AM  Subjective:   Admit Date: 10/24/2018  PCP: Rosy Kay MD    Interval History: No N&V, diarrhea    DIET GENERAL;  Dietary Nutrition Supplements: Standard High Calorie Oral Supplement    Intake/Output Summary (Last 24 hours) at 10/26/18 0728  Last data filed at 10/26/18 0415   Gross per 24 hour   Intake             1210 ml   Output              900 ml   Net              310 ml     Medications:      sodium chloride 150 mL/hr at 10/25/18 0421    lactated ringers 125 mL/hr at 10/25/18 0219      amLODIPine  10 mg Oral Nightly    losartan  100 mg Oral Daily    dexamethasone  10 mg Intravenous Once    coenzyme Q10  200 mg Oral Daily    sodium chloride flush  10 mL Intravenous 2 times per day    acetaminophen  1,000 mg Oral Q8H    docusate sodium  100 mg Oral BID    rivaroxaban  10 mg Oral Daily    oxyCODONE  10 mg Oral 2 times per day    traMADol  50 mg Oral Q6H    tamsulosin  0.4 mg Oral Daily     Recent Labs      10/25/18   0135  10/26/18   0151   HGB  12.4*  11.6*     Recent Labs      10/25/18   0135  10/26/18   0151   NA  138  138   K  4.4  4.2   CL  100  101   CO2  26  27   BUN  16  17   CREATININE  0.9  0.9   GLUCOSE  147*  134*     No results for input(s): AST, ALT, ALB, BILITOT, ALKPHOS in the last 72 hours. Objective:   Vitals: /64   Pulse 70   Temp 99.9 °F (37.7 °C) (Oral)   Resp 16   Ht 6' (1.829 m)   Wt 270 lb (122.5 kg)   SpO2 90%   BMI 36.62 kg/m²   General appearance: alert and cooperative with exam  Lungs: clear to auscultation bilaterally  Heart: regular rate and rhythm, S1, S2 normal, no murmur, click, rub or gallop  Abdomen: soft, non-tender; bowel sounds normal; no masses,  no organomegaly  Extremities: extremities normal, atraumatic, no cyanosis or edema  Neurologic: No obvious focal neurologic deficits. Assessment and Plan:   1. Left TKA, POD #2: Had a fever last night. 2. Fever: CXR, ua w/ c&s.   3. HTN: On the low side this am,

## 2018-10-26 NOTE — DISCHARGE SUMMARY
[facility] [address] [place] [address]  Discharge Summary    The Brandee Sargent is a 60 y.o. male underwent L TKA procedure without complication. Brandee Sargent was admitted to the floor following his   recovery in the PACU.      Discharge Diagnosis  left knee djd    Current Inpatient Medications    Current Facility-Administered Medications: amLODIPine (NORVASC) tablet 10 mg, 10 mg, Oral, Nightly  losartan (COZAAR) tablet 100 mg, 100 mg, Oral, Daily  cloNIDine (CATAPRES) tablet 0.1 mg, 0.1 mg, Oral, Q4H PRN  dexamethasone (DECADRON) injection 10 mg, 10 mg, Intravenous, Once  coenzyme Q10 capsule 200 mg, 200 mg, Oral, Daily  0.9 % sodium chloride infusion, , Intravenous, Continuous  0.9 % sodium chloride bolus, 500 mL, Intravenous, PRN  sodium chloride flush 0.9 % injection 10 mL, 10 mL, Intravenous, 2 times per day  sodium chloride flush 0.9 % injection 10 mL, 10 mL, Intravenous, PRN  acetaminophen (TYLENOL) tablet 1,000 mg, 1,000 mg, Oral, Q8H  oxyCODONE (ROXICODONE) immediate release tablet 5 mg, 5 mg, Oral, Q4H PRN **OR** oxyCODONE (ROXICODONE) immediate release tablet 10 mg, 10 mg, Oral, Q4H PRN  docusate sodium (COLACE) capsule 100 mg, 100 mg, Oral, BID  magnesium hydroxide (MILK OF MAGNESIA) 400 MG/5ML suspension 30 mL, 30 mL, Oral, Daily PRN  bisacodyl (DULCOLAX) suppository 10 mg, 10 mg, Rectal, Daily PRN  ondansetron (ZOFRAN) injection 4 mg, 4 mg, Intravenous, Q6H PRN  rivaroxaban (XARELTO) tablet 10 mg, 10 mg, Oral, Daily  oxyCODONE (OXYCONTIN) extended release tablet 10 mg, 10 mg, Oral, 2 times per day  oxyCODONE (ROXICODONE) immediate release tablet 10 mg, 10 mg, Oral, Q4H PRN **OR** oxyCODONE (ROXICODONE) immediate release tablet 20 mg, 20 mg, Oral, Q4H PRN  diazepam (VALIUM) tablet 5 mg, 5 mg, Oral, Q6H PRN  traMADol (ULTRAM) tablet 50 mg, 50 mg, Oral, Q6H  HYDROmorphone (DILAUDID) injection 0.5 mg, 0.5 mg, Intravenous, Q3H PRN **OR** HYDROmorphone (DILAUDID) injection 1 mg, 1 mg, Intravenous, Q3H

## 2018-10-26 NOTE — PROGRESS NOTES
Occupational Therapy  Facility/Department: [facility] SURG SERVICES  Daily Treatment Note  NAME: Martinez Diaz  : 1955  MRN: [id number]    Date of Service: 10/26/2018    Discharge Recommendations:          Patient Diagnosis(es): There were no encounter diagnoses. has a past medical history of Arthritis; Head injury; Heel spur; HTN (hypertension); Knee pain; and Seasonal allergies. has a past surgical history that includes Knee arthroscopy; Colonoscopy (2007); and pr total knee arthroplasty (Left, 10/24/2018). Restrictions  Restrictions/Precautions  Restrictions/Precautions: Weight Bearing, Fall Risk  Required Braces or Orthoses?: No  Lower Extremity Weight Bearing Restrictions  Left Lower Extremity Weight Bearing: Weight Bearing As Tolerated  Subjective   General  Chart Reviewed: Yes  Patient assessed for rehabilitation services?: Yes  Family / Caregiver Present: Yes  Diagnosis: L TKR  General Comment  Comments: Pt. states he feels well and is hoping to go home today. Pain Assessment  Patient Currently in Pain: Yes  Pain Assessment: Faces  Santos-Baker Pain Rating: Hurts a little bit  Pain Type: Surgical pain  Pain Location: Knee  Pain Orientation: Left  Pain Descriptors: Aching  Pain Intervention(s): Medication (see eMar)  Vital Signs  Patient Currently in Pain: Yes   Orientation  Orientation  Overall Orientation Status: Within Normal Limits  Objective    ADL  Feeding: Independent  Grooming: Independent  UE Bathing: Supervision  LE Bathing: Supervision  UE Dressing: Supervision  LE Dressing: Minimal assistance        Standing Balance  Sit to stand: Supervision     Transfers  Stand Step Transfers: Supervision  Sit to stand: Supervision                                                                 Assessment   Assessment: Still needing a little assist with socks on L foot but should be able to do this Independently in a few days.              Plan   Plan  Times per week: 3-5x/week  Times per day: Daily  G-Code     OutComes Score                                           AM-PAC Score             Goals  Short term goals  Time Frame for Short term goals: 1 week  Short term goal 1: Supervision with seated LB dsg  Short term goal 2: Supervision with toilet tfers  Short term goal 3: Supervision with clothing mgmt.  in standing  Long term goals  Long term goal 1: Return to PLOF       Therapy Time   Individual Concurrent Group Co-treatment   Time In           Time Out           Minutes                   Jason Krueger OTR/L

## 2018-11-19 ENCOUNTER — TELEPHONE (OUTPATIENT)
Dept: INPATIENT UNIT | Age: 63
End: 2018-11-19

## 2020-01-14 RX ORDER — MULTIPLE VITAMINS W/ MINERALS TAB 9MG-400MCG
1 TAB ORAL DAILY
COMMUNITY

## 2020-01-14 RX ORDER — KETOCONAZOLE 20 MG/G
CREAM TOPICAL DAILY
Status: ON HOLD | COMMUNITY
End: 2020-02-19

## 2020-01-14 RX ORDER — TRIAMTERENE AND HYDROCHLOROTHIAZIDE 37.5; 25 MG/1; MG/1
1 CAPSULE ORAL EVERY MORNING
COMMUNITY
End: 2020-06-19

## 2020-02-12 NOTE — H&P (VIEW-ONLY)
[facility] Gastroenterology    Primary Physician Marcos Walls MD    2/13/2020    Ken Edmondson   1955      Chief Complaint   Patient presents with   • Colonoscopy       Subjective     HPI    Ken Edmondson is a 64 y.o. male who presents as a referral for preventative maintenance. He has no complaints of nausea or vomiting. No change in bowels. No wt loss. No BRBPR. No melena. No abdominal pain.        Last colonoscopy was around 10 years ago by Dr. Lopez and was okay per patient.  The patient does not have history of colon polyps. The patient does not have history of colon cancer.   There is family history of colon polyps brother. There is family history of colon cancer mother.     Past Medical History:   Diagnosis Date   • Arthritis    • Colon polyp    • Hyperlipidemia    • Hypertension    • Joint pain    • Kidney stones    • Sinus infection 11/13/2017   • Sleep apnea with use of continuous positive airway pressure (CPAP)        Past Surgical History:   Procedure Laterality Date   • COLONOSCOPY     • KNEE ARTHROSCOPY MENISCUS TRANSPLANT Left    • URETEROSCOPY LASER LITHOTRIPSY WITH STENT INSERTION Right 11/14/2017    Procedure: URETEROSCOPY LASER LITHOTRIPSY WITH STENT INSERTION;  Surgeon: Ken Headley MD;  Location: [facility] OR;  Service:        Outpatient Medications Marked as Taking for the 2/13/20 encounter (Office Visit) with Jody Terry APRN   Medication Sig Dispense Refill   • amLODIPine (NORVASC) 5 MG tablet Take 5 mg by mouth Every Night.     • Coenzyme Q10 (CO Q 10 PO) Take 1 tablet by mouth Daily.     • Fexofenadine HCl (ALLEGRA PO) Take 1 tablet by mouth Daily As Needed (allergies).     • losartan (COZAAR) 100 MG tablet Take 100 mg by mouth Daily.     • Multiple Vitamins-Minerals (MULTIVITAMIN WITH MINERALS) tablet tablet Take 1 tablet by mouth Daily.     • NAPROXEN PO Take 440 mg by mouth 2 (Two) Times a Day As Needed (pain).     • triamterene-hydrochlorothiazide  (DYAZIDE) 37.5-25 MG per capsule Take 1 capsule by mouth Every Morning.         No Known Allergies    Social History     Socioeconomic History   • Marital status:      Spouse name: Not on file   • Number of children: Not on file   • Years of education: Not on file   • Highest education level: Not on file   Tobacco Use   • Smoking status: Former Smoker     Types: Cigarettes   • Smokeless tobacco: Never Used   Substance and Sexual Activity   • Alcohol use: Yes     Comment: occ.   • Drug use: No   • Sexual activity: Defer       Family History   Problem Relation Age of Onset   • Hypertension Father    • Colon cancer Mother        Review of Systems   Constitutional: Negative for appetite change, chills, fatigue, fever and unexpected weight change.   HENT: Negative for sore throat and trouble swallowing.    Eyes: Negative for visual disturbance.   Respiratory: Negative for cough, shortness of breath and wheezing.    Cardiovascular: Negative for chest pain and palpitations.   Gastrointestinal: Negative for abdominal distention, abdominal pain, anal bleeding, blood in stool, constipation, diarrhea, nausea and vomiting.        As mentioned in hpi   Genitourinary: Negative for difficulty urinating and hematuria.   Musculoskeletal: Negative for arthralgias and back pain.   Skin: Negative for color change and rash.   Neurological: Negative for dizziness, seizures, syncope, light-headedness and headaches.   Hematological: Negative for adenopathy.   Psychiatric/Behavioral: Negative for confusion. The patient is not nervous/anxious.        Objective     Vitals:    02/13/20 0840   BP: 158/92   Pulse: 66   Temp: 98 °F (36.7 °C)   SpO2: 99%         02/13/20  0840   Weight: (!) 137 kg (303 lb)     Body mass index is 41.09 kg/m².    Physical Exam   Constitutional: He appears well-developed and well-nourished. No distress.   HENT:   Head: Normocephalic and atraumatic.   Eyes: EOM are normal. No scleral icterus.   Neck: Neck  supple. No JVD present.   Cardiovascular: Normal rate, regular rhythm and normal heart sounds.   Pulmonary/Chest: Effort normal and breath sounds normal.   Abdominal: Soft. Bowel sounds are normal. He exhibits no distension. There is no tenderness.   Musculoskeletal: Normal range of motion. He exhibits no deformity.   Neurological: He is alert.   Skin: Skin is warm and dry. No rash noted.   Psychiatric: He has a normal mood and affect. His behavior is normal.   Vitals reviewed.      Imaging Results (Most Recent)     None          Assessment/Plan     Ken was seen today for colonoscopy.    Diagnoses and all orders for this visit:    Family hx of colon cancer  -     Case Request; Standing    Family hx colonic polyps  -     Case Request; Standing    Essential hypertension    Other orders  -     Follow Anesthesia Guidelines / Protocol; Future  -     Obtain Informed Consent; Future  -     Sod Picosulfate-Mag Ox-Cit Acd (CLENPIQ) 10-3.5-12 MG-GM -GM/160ML solution; Take 2 bottles by mouth 1 (One) Time for 1 dose. Take as directed            Plan for colonoscopy. The patient was advised to take any blood pressure or heart  medications the morning of  procedure if that is when he/she normally takes.  Hold naproxen 5 days prior to procedure.          Body mass index is 41.09 kg/m².    Patient's Body mass index is 41.09 kg/m². BMI is above normal parameters. Recommendations include: no follow up ,recommend weight loss .      COLONOSCOPY WITH ANESTHESIA (N/A)  All risks, benefits, alternatives, and indications of colonoscopy procedure have been discussed with the patient. Risks to include perforation of the colon requiring possible surgery or colostomy, risk of bleeding from biopsies or removal of colon tissue, possibility of missing a colon polyp or cancer, or adverse drug reaction.  Benefits to include the diagnosis and management of disease of the colon and rectum. Alternatives to include barium enema, radiographic  evaluation, lab testing or no intervention. Pt verbalizes understanding and agrees.       BREANNA Motley      EMR Dragon/transcription disclaimer:  Much of this encounter note is electronic transcription/translation of spoken language to printed text.  The electronic translation of spoken language may be erroneous, or at times, nonsensical words or phrases may be inadvertently transcribed.  Although I have reviewed the note for such errors, some may still exist.

## 2020-02-12 NOTE — PROGRESS NOTES
[facility] Gastroenterology    Primary Physician Marcos Walls MD    2/13/2020    Ken Edmondson   1955      Chief Complaint   Patient presents with   • Colonoscopy       Subjective     HPI    Ken Edmondson is a 64 y.o. male who presents as a referral for preventative maintenance. He has no complaints of nausea or vomiting. No change in bowels. No wt loss. No BRBPR. No melena. No abdominal pain.        Last colonoscopy was around 10 years ago by Dr. Lopez and was okay per patient.  The patient does not have history of colon polyps. The patient does not have history of colon cancer.   There is family history of colon polyps brother. There is family history of colon cancer mother.     Past Medical History:   Diagnosis Date   • Arthritis    • Colon polyp    • Hyperlipidemia    • Hypertension    • Joint pain    • Kidney stones    • Sinus infection 11/13/2017   • Sleep apnea with use of continuous positive airway pressure (CPAP)        Past Surgical History:   Procedure Laterality Date   • COLONOSCOPY     • KNEE ARTHROSCOPY MENISCUS TRANSPLANT Left    • URETEROSCOPY LASER LITHOTRIPSY WITH STENT INSERTION Right 11/14/2017    Procedure: URETEROSCOPY LASER LITHOTRIPSY WITH STENT INSERTION;  Surgeon: Ken Headley MD;  Location: [facility] OR;  Service:        Outpatient Medications Marked as Taking for the 2/13/20 encounter (Office Visit) with Jody Terry APRN   Medication Sig Dispense Refill   • amLODIPine (NORVASC) 5 MG tablet Take 5 mg by mouth Every Night.     • Coenzyme Q10 (CO Q 10 PO) Take 1 tablet by mouth Daily.     • Fexofenadine HCl (ALLEGRA PO) Take 1 tablet by mouth Daily As Needed (allergies).     • losartan (COZAAR) 100 MG tablet Take 100 mg by mouth Daily.     • Multiple Vitamins-Minerals (MULTIVITAMIN WITH MINERALS) tablet tablet Take 1 tablet by mouth Daily.     • NAPROXEN PO Take 440 mg by mouth 2 (Two) Times a Day As Needed (pain).     • triamterene-hydrochlorothiazide  (DYAZIDE) 37.5-25 MG per capsule Take 1 capsule by mouth Every Morning.         No Known Allergies    Social History     Socioeconomic History   • Marital status:      Spouse name: Not on file   • Number of children: Not on file   • Years of education: Not on file   • Highest education level: Not on file   Tobacco Use   • Smoking status: Former Smoker     Types: Cigarettes   • Smokeless tobacco: Never Used   Substance and Sexual Activity   • Alcohol use: Yes     Comment: occ.   • Drug use: No   • Sexual activity: Defer       Family History   Problem Relation Age of Onset   • Hypertension Father    • Colon cancer Mother        Review of Systems   Constitutional: Negative for appetite change, chills, fatigue, fever and unexpected weight change.   HENT: Negative for sore throat and trouble swallowing.    Eyes: Negative for visual disturbance.   Respiratory: Negative for cough, shortness of breath and wheezing.    Cardiovascular: Negative for chest pain and palpitations.   Gastrointestinal: Negative for abdominal distention, abdominal pain, anal bleeding, blood in stool, constipation, diarrhea, nausea and vomiting.        As mentioned in hpi   Genitourinary: Negative for difficulty urinating and hematuria.   Musculoskeletal: Negative for arthralgias and back pain.   Skin: Negative for color change and rash.   Neurological: Negative for dizziness, seizures, syncope, light-headedness and headaches.   Hematological: Negative for adenopathy.   Psychiatric/Behavioral: Negative for confusion. The patient is not nervous/anxious.        Objective     Vitals:    02/13/20 0840   BP: 158/92   Pulse: 66   Temp: 98 °F (36.7 °C)   SpO2: 99%         02/13/20  0840   Weight: (!) 137 kg (303 lb)     Body mass index is 41.09 kg/m².    Physical Exam   Constitutional: He appears well-developed and well-nourished. No distress.   HENT:   Head: Normocephalic and atraumatic.   Eyes: EOM are normal. No scleral icterus.   Neck: Neck  supple. No JVD present.   Cardiovascular: Normal rate, regular rhythm and normal heart sounds.   Pulmonary/Chest: Effort normal and breath sounds normal.   Abdominal: Soft. Bowel sounds are normal. He exhibits no distension. There is no tenderness.   Musculoskeletal: Normal range of motion. He exhibits no deformity.   Neurological: He is alert.   Skin: Skin is warm and dry. No rash noted.   Psychiatric: He has a normal mood and affect. His behavior is normal.   Vitals reviewed.      Imaging Results (Most Recent)     None          Assessment/Plan     Ken was seen today for colonoscopy.    Diagnoses and all orders for this visit:    Family hx of colon cancer  -     Case Request; Standing    Family hx colonic polyps  -     Case Request; Standing    Essential hypertension    Other orders  -     Follow Anesthesia Guidelines / Protocol; Future  -     Obtain Informed Consent; Future  -     Sod Picosulfate-Mag Ox-Cit Acd (CLENPIQ) 10-3.5-12 MG-GM -GM/160ML solution; Take 2 bottles by mouth 1 (One) Time for 1 dose. Take as directed            Plan for colonoscopy. The patient was advised to take any blood pressure or heart  medications the morning of  procedure if that is when he/she normally takes.  Hold naproxen 5 days prior to procedure.          Body mass index is 41.09 kg/m².    Patient's Body mass index is 41.09 kg/m². BMI is above normal parameters. Recommendations include: no follow up ,recommend weight loss .      COLONOSCOPY WITH ANESTHESIA (N/A)  All risks, benefits, alternatives, and indications of colonoscopy procedure have been discussed with the patient. Risks to include perforation of the colon requiring possible surgery or colostomy, risk of bleeding from biopsies or removal of colon tissue, possibility of missing a colon polyp or cancer, or adverse drug reaction.  Benefits to include the diagnosis and management of disease of the colon and rectum. Alternatives to include barium enema, radiographic  evaluation, lab testing or no intervention. Pt verbalizes understanding and agrees.       BREANNA Motley      EMR Dragon/transcription disclaimer:  Much of this encounter note is electronic transcription/translation of spoken language to printed text.  The electronic translation of spoken language may be erroneous, or at times, nonsensical words or phrases may be inadvertently transcribed.  Although I have reviewed the note for such errors, some may still exist.

## 2020-02-13 ENCOUNTER — OFFICE VISIT (OUTPATIENT)
Dept: GASTROENTEROLOGY | Facility: CLINIC | Age: 65
End: 2020-02-13

## 2020-02-13 VITALS
HEIGHT: 72 IN | HEART RATE: 66 BPM | WEIGHT: 303 LBS | BODY MASS INDEX: 41.04 KG/M2 | DIASTOLIC BLOOD PRESSURE: 92 MMHG | SYSTOLIC BLOOD PRESSURE: 158 MMHG | TEMPERATURE: 98 F | OXYGEN SATURATION: 99 %

## 2020-02-13 DIAGNOSIS — Z80.0 FAMILY HX OF COLON CANCER: Primary | ICD-10-CM

## 2020-02-13 DIAGNOSIS — I10 ESSENTIAL HYPERTENSION: ICD-10-CM

## 2020-02-13 DIAGNOSIS — Z83.71 FAMILY HX COLONIC POLYPS: ICD-10-CM

## 2020-02-13 PROBLEM — Z83.719 FAMILY HX COLONIC POLYPS: Status: ACTIVE | Noted: 2020-02-13

## 2020-02-13 PROCEDURE — S0285 CNSLT BEFORE SCREEN COLONOSC: HCPCS | Performed by: NURSE PRACTITIONER

## 2020-02-19 ENCOUNTER — ANESTHESIA EVENT (OUTPATIENT)
Dept: GASTROENTEROLOGY | Facility: HOSPITAL | Age: 65
End: 2020-02-19

## 2020-02-19 ENCOUNTER — HOSPITAL ENCOUNTER (OUTPATIENT)
Facility: HOSPITAL | Age: 65
Setting detail: HOSPITAL OUTPATIENT SURGERY
Discharge: HOME | End: 2020-02-19
Attending: INTERNAL MEDICINE | Admitting: INTERNAL MEDICINE

## 2020-02-19 ENCOUNTER — ANESTHESIA (OUTPATIENT)
Dept: GASTROENTEROLOGY | Facility: HOSPITAL | Age: 65
End: 2020-02-19

## 2020-02-19 ENCOUNTER — TELEPHONE (OUTPATIENT)
Dept: GASTROENTEROLOGY | Facility: CLINIC | Age: 65
End: 2020-02-19

## 2020-02-19 VITALS
HEART RATE: 58 BPM | BODY MASS INDEX: 40.36 KG/M2 | DIASTOLIC BLOOD PRESSURE: 69 MMHG | WEIGHT: 298 LBS | RESPIRATION RATE: 16 BRPM | SYSTOLIC BLOOD PRESSURE: 138 MMHG | HEIGHT: 72 IN | TEMPERATURE: 98 F | OXYGEN SATURATION: 100 %

## 2020-02-19 PROCEDURE — G0105 COLORECTAL SCRN; HI RISK IND: HCPCS | Performed by: INTERNAL MEDICINE

## 2020-02-19 PROCEDURE — 25010000002 PROPOFOL 10 MG/ML EMULSION: Performed by: NURSE ANESTHETIST, CERTIFIED REGISTERED

## 2020-02-19 RX ORDER — SODIUM CHLORIDE 0.9 % (FLUSH) 0.9 %
10 SYRINGE (ML) INJECTION AS NEEDED
Status: DISCONTINUED | OUTPATIENT
Start: 2020-02-19 | End: 2020-02-19 | Stop reason: HOSPADM

## 2020-02-19 RX ORDER — ONDANSETRON 2 MG/ML
4 INJECTION INTRAMUSCULAR; INTRAVENOUS ONCE AS NEEDED
Status: DISCONTINUED | OUTPATIENT
Start: 2020-02-19 | End: 2020-02-19 | Stop reason: HOSPADM

## 2020-02-19 RX ORDER — SODIUM CHLORIDE 9 MG/ML
500 INJECTION, SOLUTION INTRAVENOUS CONTINUOUS PRN
Status: DISCONTINUED | OUTPATIENT
Start: 2020-02-19 | End: 2020-02-19 | Stop reason: HOSPADM

## 2020-02-19 RX ORDER — PROPOFOL 10 MG/ML
VIAL (ML) INTRAVENOUS AS NEEDED
Status: DISCONTINUED | OUTPATIENT
Start: 2020-02-19 | End: 2020-02-19 | Stop reason: SURG

## 2020-02-19 RX ADMIN — PROPOFOL 240 MG: 10 INJECTION, EMULSION INTRAVENOUS at 12:47

## 2020-02-19 RX ADMIN — LIDOCAINE HYDROCHLORIDE 100 MG: 20 INJECTION, SOLUTION INTRAVENOUS at 12:47

## 2020-02-19 RX ADMIN — SODIUM CHLORIDE 500 ML: 9 INJECTION, SOLUTION INTRAVENOUS at 10:55

## 2020-02-19 NOTE — ANESTHESIA POSTPROCEDURE EVALUATION
Patient: Ken Edmondson    Procedure Summary     Date:  02/19/20 Room / Location:   [facility] /  [facility]    Anesthesia Start:  1244 Anesthesia Stop:  1304    Procedure:  COLONOSCOPY WITH ANESTHESIA (N/A ) Diagnosis:       Family hx of colon cancer      Family hx colonic polyps      (Family hx of colon cancer [Z80.0])      (Family hx colonic polyps [Z83.71])    Surgeon:  Ludwin Conti MD Provider:  Steph Eugene CRNA    Anesthesia Type:  MAC ASA Status:  3          Anesthesia Type: MAC    Vitals  No vitals data found for the desired time range.          Post Anesthesia Care and Evaluation    Patient location during evaluation: PHASE II  Level of consciousness: awake and alert  Pain management: adequate  Airway patency: patent  Anesthetic complications: No anesthetic complications  PONV Status: none  Cardiovascular status: acceptable  Respiratory status: acceptable  Hydration status: acceptable

## 2020-02-19 NOTE — ANESTHESIA PREPROCEDURE EVALUATION
Anesthesia Evaluation     Patient summary reviewed   no history of anesthetic complications:  NPO Solid Status: > 8 hours  NPO Liquid Status: > 2 hours           Airway   Mallampati: II  TM distance: <3 FB  No difficulty expected  Dental - normal exam     Pulmonary    (+) sleep apnea on CPAP,   (-) COPD, asthma, not a smoker  Cardiovascular   Exercise tolerance: good (4-7 METS)    (+) hypertension, hyperlipidemia,   (-) past MI, CAD, dysrhythmias      Neuro/Psych  (-) seizures, TIA, CVA  GI/Hepatic/Renal/Endo    (+) morbid obesity,    (-) liver disease, no renal disease, diabetes    Musculoskeletal     Abdominal    Substance History      OB/GYN          Other                        Anesthesia Plan    ASA 3     MAC     intravenous induction     Anesthetic plan, all risks, benefits, and alternatives have been provided, discussed and informed consent has been obtained with: patient.

## 2020-06-19 PROCEDURE — U0003 INFECTIOUS AGENT DETECTION BY NUCLEIC ACID (DNA OR RNA); SEVERE ACUTE RESPIRATORY SYNDROME CORONAVIRUS 2 (SARS-COV-2) (CORONAVIRUS DISEASE [COVID-19]), AMPLIFIED PROBE TECHNIQUE, MAKING USE OF HIGH THROUGHPUT TECHNOLOGIES AS DESCRIBED BY CMS-2020-01-R: HCPCS | Performed by: NURSE PRACTITIONER

## 2020-06-20 ENCOUNTER — TELEPHONE (OUTPATIENT)
Dept: URGENT CARE | Facility: CLINIC | Age: 65
End: 2020-06-20

## 2020-06-20 NOTE — TELEPHONE ENCOUNTER
Spoke with patient to discuss negative Covid-19 test results, and he reports absolutely no signs/symptoms of illness. He agrees to follow-up with a healthcare provider if anything changes.    COVID-19 Test Result   Telephone Encounter    Patient Name: Ken Edmondson   : 1955   MRN: [id number]     SARS-CoV-2, PRATIK   Date Value Ref Range Status   2020 Not Detected Not Detected Final     Comment:     This test was developed and its performance characteristics determined  by [employer]. This test has not been FDA cleared or  approved. This test has been authorized by FDA under an Emergency Use  Authorization (EUA). This test is only authorized for the duration of  time the declaration that circumstances exist justifying the  authorization of the emergency use of in vitro diagnostic tests for  detection of SARS-CoV-2 virus and/or diagnosis of COVID-19 infection  under section 564(b)(1) of the Act, 21 U.S.C. 360bbb-3(b)(1), unless  the authorization is terminated or revoked sooner.  When diagnostic testing is negative, the possibility of a false  negative result should be considered in the context of a patient's  recent exposures and the presence of clinical signs and symptoms  consistent with COVID-19. An individual without symptoms of COVID-19  and who is not shedding SARS-CoV-2 virus would expect to have a  negative (not detected) result in this assay.        Patient was counseled as follows:  • (-) negative COVID-19 test result with or without symptoms   • The test is not perfect, so there is a chance it could be falsely negative or the virus level is too low for detection due to being very early in the infectious process.   • The optimal duration of home isolation is uncertain. The [place] Centers for Disease Control and Prevention (CDC) has issued recommendations on discontinuation of home isolation.   • For this reason, Ken is strongly encouraged to practice the safest standards in  protecting their health and others given the current pandemic concerns. He is advised to:   o Practice social distancing in the community by staying at least 6 feet away from people   o Encouraged to use face mask while out in public   o Continue to wash their hands frequently with soap and hot water, and cover their mouth while coughing.   • If Ken is asymptomatic, he should self isolate for a total of 14 days from time of potential contact with Covid-19.   • If Ken is symptomatic then he may discontinue home isolation when the following criteria are met:   o At least seven days have passed since symptoms first appeared AND   o At least three days (72 hours) have passed since recovery of symptoms (defined as resolution of fever without the use of fever-reducing medications and improvement in respiratory symptoms [e.g., cough, shortness of breath])   • If Ken has been asymptomatic but then develops non-emergent symptoms such as mild increased shortness of breath, fever, cough, or for other questions, he  was asked to please call their primary care physician’s office or the [place] [facility] Recordsline at [phone].   · Questions were engaged and answered to the best of my ability. He         expressed verbal understanding of their test results and my advice.    Primary Care Physician verified as being: Marcos Walls MD      Electronically signed by BREANNA Ahn, 06/20/20, 4:11 PM.

## 2020-10-05 ENCOUNTER — HOSPITAL ENCOUNTER (OUTPATIENT)
Dept: GENERAL RADIOLOGY | Age: 65
Discharge: HOME | End: 2020-10-05
Payer: COMMERCIAL

## 2020-10-05 ENCOUNTER — HOSPITAL ENCOUNTER (OUTPATIENT)
Dept: PREADMISSION TESTING | Age: 65
Discharge: HOME | End: 2020-10-09
Payer: COMMERCIAL

## 2020-10-05 VITALS — HEIGHT: 72 IN | BODY MASS INDEX: 41.31 KG/M2 | WEIGHT: 305 LBS

## 2020-10-05 LAB
ABO/RH: NORMAL
ALBUMIN SERPL-MCNC: 4 G/DL (ref 3.5–5.2)
ALP BLD-CCNC: 88 U/L (ref 40–130)
ALT SERPL-CCNC: 25 U/L (ref 5–41)
ANION GAP SERPL CALCULATED.3IONS-SCNC: 10 MMOL/L (ref 7–19)
ANTIBODY SCREEN: NORMAL
ANTIBODY SCREEN: NORMAL
APTT: 28.2 SEC (ref 26–36.2)
AST SERPL-CCNC: 25 U/L (ref 5–40)
BASOPHILS ABSOLUTE: 0 K/UL (ref 0–0.2)
BASOPHILS RELATIVE PERCENT: 0.5 % (ref 0–1)
BILIRUB SERPL-MCNC: 0.4 MG/DL (ref 0.2–1.2)
BILIRUBIN URINE: NEGATIVE
BLOOD, URINE: NEGATIVE
BUN BLDV-MCNC: 15 MG/DL (ref 8–23)
CALCIUM SERPL-MCNC: 9.3 MG/DL (ref 8.8–10.2)
CHLORIDE BLD-SCNC: 106 MMOL/L (ref 98–111)
CLARITY: CLEAR
CO2: 25 MMOL/L (ref 22–29)
COLOR: YELLOW
CREAT SERPL-MCNC: 0.7 MG/DL (ref 0.5–1.2)
EOSINOPHILS ABSOLUTE: 0.3 K/UL (ref 0–0.6)
EOSINOPHILS RELATIVE PERCENT: 3.6 % (ref 0–5)
GFR AFRICAN AMERICAN: >59
GFR NON-AFRICAN AMERICAN: >60
GLUCOSE BLD-MCNC: 92 MG/DL (ref 74–109)
GLUCOSE URINE: NEGATIVE MG/DL
HCT VFR BLD CALC: 45.8 % (ref 42–52)
HEMOGLOBIN: 15.1 G/DL (ref 14–18)
IMMATURE GRANULOCYTES #: 0 K/UL
INR BLD: 0.97 (ref 0.88–1.18)
KETONES, URINE: NEGATIVE MG/DL
LEUKOCYTE ESTERASE, URINE: NEGATIVE
LYMPHOCYTES ABSOLUTE: 2.2 K/UL (ref 1.1–4.5)
LYMPHOCYTES RELATIVE PERCENT: 29.1 % (ref 20–40)
MCH RBC QN AUTO: 28.1 PG (ref 27–31)
MCHC RBC AUTO-ENTMCNC: 33 G/DL (ref 33–37)
MCV RBC AUTO: 85.3 FL (ref 80–94)
MONOCYTES ABSOLUTE: 0.7 K/UL (ref 0–0.9)
MONOCYTES RELATIVE PERCENT: 9.3 % (ref 0–10)
NEUTROPHILS ABSOLUTE: 4.3 K/UL (ref 1.5–7.5)
NEUTROPHILS RELATIVE PERCENT: 57.2 % (ref 50–65)
NITRITE, URINE: NEGATIVE
PDW BLD-RTO: 13.5 % (ref 11.5–14.5)
PH UA: 6.5 (ref 5–8)
PLATELET # BLD: 191 K/UL (ref 130–400)
PMV BLD AUTO: 11.8 FL (ref 9.4–12.4)
POTASSIUM SERPL-SCNC: 4.3 MMOL/L (ref 3.5–5)
PROTEIN UA: NEGATIVE MG/DL
PROTHROMBIN TIME: 12.8 SEC (ref 12–14.6)
RBC # BLD: 5.37 M/UL (ref 4.7–6.1)
SODIUM BLD-SCNC: 141 MMOL/L (ref 136–145)
SPECIFIC GRAVITY UA: 1.01 (ref 1–1.03)
TOTAL PROTEIN: 7 G/DL (ref 6.6–8.7)
UROBILINOGEN, URINE: 0.2 E.U./DL
WBC # BLD: 7.4 K/UL (ref 4.8–10.8)

## 2020-10-05 PROCEDURE — 85610 PROTHROMBIN TIME: CPT

## 2020-10-05 PROCEDURE — 85730 THROMBOPLASTIN TIME PARTIAL: CPT

## 2020-10-05 PROCEDURE — 86850 RBC ANTIBODY SCREEN: CPT

## 2020-10-05 PROCEDURE — 87081 CULTURE SCREEN ONLY: CPT

## 2020-10-05 PROCEDURE — 86900 BLOOD TYPING SEROLOGIC ABO: CPT

## 2020-10-05 PROCEDURE — 81003 URINALYSIS AUTO W/O SCOPE: CPT

## 2020-10-05 PROCEDURE — 71046 X-RAY EXAM CHEST 2 VIEWS: CPT

## 2020-10-05 PROCEDURE — 93005 ELECTROCARDIOGRAM TRACING: CPT | Performed by: ORTHOPAEDIC SURGERY

## 2020-10-05 PROCEDURE — 86901 BLOOD TYPING SEROLOGIC RH(D): CPT

## 2020-10-05 PROCEDURE — 85025 COMPLETE CBC W/AUTO DIFF WBC: CPT

## 2020-10-05 PROCEDURE — 80053 COMPREHEN METABOLIC PANEL: CPT

## 2020-10-05 RX ORDER — TRIAMTERENE AND HYDROCHLOROTHIAZIDE 37.5; 25 MG/1; MG/1
1 TABLET ORAL DAILY
COMMUNITY

## 2020-10-05 RX ORDER — ACETAMINOPHEN 500 MG
1000 TABLET ORAL ONCE
Status: CANCELLED | OUTPATIENT
Start: 2020-10-21

## 2020-10-05 RX ORDER — OXYCODONE HCL 10 MG/1
10 TABLET, FILM COATED, EXTENDED RELEASE ORAL ONCE
Status: CANCELLED | OUTPATIENT
Start: 2020-10-21

## 2020-10-05 RX ORDER — PREGABALIN 75 MG/1
75 CAPSULE ORAL ONCE
Status: CANCELLED | OUTPATIENT
Start: 2020-10-21

## 2020-10-05 RX ORDER — DEXAMETHASONE SODIUM PHOSPHATE 10 MG/ML
10 INJECTION, SOLUTION INTRAMUSCULAR; INTRAVENOUS ONCE
Status: CANCELLED | OUTPATIENT
Start: 2020-10-21

## 2020-10-05 RX ORDER — CELECOXIB 200 MG/1
200 CAPSULE ORAL ONCE
Status: CANCELLED | OUTPATIENT
Start: 2020-10-21

## 2020-10-06 LAB
EKG P AXIS: 60 DEGREES
EKG P-R INTERVAL: 198 MS
EKG Q-T INTERVAL: 472 MS
EKG QRS DURATION: 104 MS
EKG QTC CALCULATION (BAZETT): 467 MS
EKG T AXIS: 40 DEGREES
MRSA CULTURE ONLY: NORMAL

## 2020-10-06 PROCEDURE — 93010 ELECTROCARDIOGRAM REPORT: CPT | Performed by: INTERNAL MEDICINE

## 2020-10-16 ENCOUNTER — HOSPITAL ENCOUNTER (OUTPATIENT)
Dept: PREADMISSION TESTING | Age: 65
Discharge: HOME | End: 2020-10-20
Payer: COMMERCIAL

## 2020-10-16 LAB — SARS-COV-2, PCR: NOT DETECTED

## 2020-10-16 PROCEDURE — U0003 INFECTIOUS AGENT DETECTION BY NUCLEIC ACID (DNA OR RNA); SEVERE ACUTE RESPIRATORY SYNDROME CORONAVIRUS 2 (SARS-COV-2) (CORONAVIRUS DISEASE [COVID-19]), AMPLIFIED PROBE TECHNIQUE, MAKING USE OF HIGH THROUGHPUT TECHNOLOGIES AS DESCRIBED BY CMS-2020-01-R: HCPCS

## 2020-10-21 ENCOUNTER — ANESTHESIA (OUTPATIENT)
Dept: OPERATING ROOM | Age: 65
DRG: 470 | End: 2020-10-21
Payer: COMMERCIAL

## 2020-10-21 ENCOUNTER — APPOINTMENT (OUTPATIENT)
Dept: GENERAL RADIOLOGY | Age: 65
DRG: 470 | End: 2020-10-21
Attending: ORTHOPAEDIC SURGERY
Payer: COMMERCIAL

## 2020-10-21 ENCOUNTER — ANESTHESIA EVENT (OUTPATIENT)
Dept: OPERATING ROOM | Age: 65
DRG: 470 | End: 2020-10-21
Payer: COMMERCIAL

## 2020-10-21 ENCOUNTER — HOSPITAL ENCOUNTER (INPATIENT)
Age: 65
LOS: 2 days | Discharge: HOME HEALTH | DRG: 470 | End: 2020-10-23
Attending: ORTHOPAEDIC SURGERY | Admitting: ORTHOPAEDIC SURGERY
Payer: COMMERCIAL

## 2020-10-21 VITALS
SYSTOLIC BLOOD PRESSURE: 141 MMHG | RESPIRATION RATE: 2 BRPM | DIASTOLIC BLOOD PRESSURE: 78 MMHG | TEMPERATURE: 98.2 F | OXYGEN SATURATION: 85 %

## 2020-10-21 PROBLEM — M17.11 PRIMARY OSTEOARTHRITIS OF RIGHT KNEE: Status: ACTIVE | Noted: 2020-10-21

## 2020-10-21 LAB
ABO/RH: NORMAL
ANTIBODY SCREEN: NORMAL

## 2020-10-21 PROCEDURE — 36415 COLL VENOUS BLD VENIPUNCTURE: CPT

## 2020-10-21 PROCEDURE — 86900 BLOOD TYPING SEROLOGIC ABO: CPT

## 2020-10-21 PROCEDURE — 6360000002 HC RX W HCPCS: Performed by: NURSE ANESTHETIST, CERTIFIED REGISTERED

## 2020-10-21 PROCEDURE — C1713 ANCHOR/SCREW BN/BN,TIS/BN: HCPCS | Performed by: ORTHOPAEDIC SURGERY

## 2020-10-21 PROCEDURE — 2580000003 HC RX 258: Performed by: ANESTHESIOLOGY

## 2020-10-21 PROCEDURE — 6360000002 HC RX W HCPCS: Performed by: ORTHOPAEDIC SURGERY

## 2020-10-21 PROCEDURE — 3700000000 HC ANESTHESIA ATTENDED CARE: Performed by: ORTHOPAEDIC SURGERY

## 2020-10-21 PROCEDURE — 64447 NJX AA&/STRD FEMORAL NRV IMG: CPT | Performed by: NURSE ANESTHETIST, CERTIFIED REGISTERED

## 2020-10-21 PROCEDURE — 6360000002 HC RX W HCPCS: Performed by: ANESTHESIOLOGY

## 2020-10-21 PROCEDURE — 86901 BLOOD TYPING SEROLOGIC RH(D): CPT

## 2020-10-21 PROCEDURE — 2700000000 HC OXYGEN THERAPY PER DAY

## 2020-10-21 PROCEDURE — 2709999900 HC NON-CHARGEABLE SUPPLY: Performed by: ORTHOPAEDIC SURGERY

## 2020-10-21 PROCEDURE — 73560 X-RAY EXAM OF KNEE 1 OR 2: CPT

## 2020-10-21 PROCEDURE — 7100000000 HC PACU RECOVERY - FIRST 15 MIN: Performed by: ORTHOPAEDIC SURGERY

## 2020-10-21 PROCEDURE — 7100000001 HC PACU RECOVERY - ADDTL 15 MIN: Performed by: ORTHOPAEDIC SURGERY

## 2020-10-21 PROCEDURE — 2500000003 HC RX 250 WO HCPCS: Performed by: ORTHOPAEDIC SURGERY

## 2020-10-21 PROCEDURE — C9290 INJ, BUPIVACAINE LIPOSOME: HCPCS | Performed by: ORTHOPAEDIC SURGERY

## 2020-10-21 PROCEDURE — 2500000003 HC RX 250 WO HCPCS: Performed by: NURSE ANESTHETIST, CERTIFIED REGISTERED

## 2020-10-21 PROCEDURE — 6370000000 HC RX 637 (ALT 250 FOR IP): Performed by: ORTHOPAEDIC SURGERY

## 2020-10-21 PROCEDURE — 2580000003 HC RX 258: Performed by: ORTHOPAEDIC SURGERY

## 2020-10-21 PROCEDURE — 3600000005 HC SURGERY LEVEL 5 BASE: Performed by: ORTHOPAEDIC SURGERY

## 2020-10-21 PROCEDURE — C1776 JOINT DEVICE (IMPLANTABLE): HCPCS | Performed by: ORTHOPAEDIC SURGERY

## 2020-10-21 PROCEDURE — 0SRC069 REPLACEMENT OF RIGHT KNEE JOINT WITH OXIDIZED ZIRCONIUM ON POLYETHYLENE SYNTHETIC SUBSTITUTE, CEMENTED, OPEN APPROACH: ICD-10-PCS | Performed by: ORTHOPAEDIC SURGERY

## 2020-10-21 PROCEDURE — 86850 RBC ANTIBODY SCREEN: CPT

## 2020-10-21 PROCEDURE — 3700000001 HC ADD 15 MINUTES (ANESTHESIA): Performed by: ORTHOPAEDIC SURGERY

## 2020-10-21 PROCEDURE — 3600000015 HC SURGERY LEVEL 5 ADDTL 15MIN: Performed by: ORTHOPAEDIC SURGERY

## 2020-10-21 PROCEDURE — 1210000000 HC MED SURG R&B

## 2020-10-21 PROCEDURE — 2720000010 HC SURG SUPPLY STERILE: Performed by: ORTHOPAEDIC SURGERY

## 2020-10-21 DEVICE — Z  INACTIVE USE 2750024 CEMENT BONE 40GM W/ GENTMYCN HI VISC PALACOS R+G: Type: IMPLANTABLE DEVICE | Status: FUNCTIONAL

## 2020-10-21 DEVICE — IMPL KNEE PATELLA ALL POLY PSN 41MM: Type: IMPLANTABLE DEVICE | Status: FUNCTIONAL

## 2020-10-21 DEVICE — PLUG BNE CEM L POLYETH FOR 14-17MM IM CNL: Type: IMPLANTABLE DEVICE | Status: FUNCTIONAL

## 2020-10-21 DEVICE — IMPL KNEE FEM CEM PS STND RIGHT SZ 10: Type: IMPLANTABLE DEVICE | Status: FUNCTIONAL

## 2020-10-21 DEVICE — EXTENSION STEM L30MM DIA14MM KNEE TAPR CEM PERSONA: Type: IMPLANTABLE DEVICE | Status: FUNCTIONAL

## 2020-10-21 DEVICE — PSN TIB STM 5 DEG SZ H R: Type: IMPLANTABLE DEVICE | Status: FUNCTIONAL

## 2020-10-21 DEVICE — IMPL KNEE ASF PS 10MM PLY RT 10-12GH: Type: IMPLANTABLE DEVICE | Status: FUNCTIONAL

## 2020-10-21 RX ORDER — DIPHENHYDRAMINE HCL 25 MG
25 TABLET ORAL EVERY 6 HOURS PRN
Status: DISCONTINUED | OUTPATIENT
Start: 2020-10-21 | End: 2020-10-23 | Stop reason: HOSPADM

## 2020-10-21 RX ORDER — SODIUM CHLORIDE 9 MG/ML
INJECTION, SOLUTION INTRAVENOUS CONTINUOUS
Status: DISCONTINUED | OUTPATIENT
Start: 2020-10-21 | End: 2020-10-23 | Stop reason: HOSPADM

## 2020-10-21 RX ORDER — TRANEXAMIC ACID 100 MG/ML
INJECTION, SOLUTION INTRAVENOUS PRN
Status: DISCONTINUED | OUTPATIENT
Start: 2020-10-21 | End: 2020-10-21 | Stop reason: SDUPTHER

## 2020-10-21 RX ORDER — LIDOCAINE HYDROCHLORIDE 10 MG/ML
1 INJECTION, SOLUTION EPIDURAL; INFILTRATION; INTRACAUDAL; PERINEURAL
Status: DISCONTINUED | OUTPATIENT
Start: 2020-10-21 | End: 2020-10-21 | Stop reason: HOSPADM

## 2020-10-21 RX ORDER — POLYETHYLENE GLYCOL 3350 17 G/17G
17 POWDER, FOR SOLUTION ORAL DAILY
Status: DISCONTINUED | OUTPATIENT
Start: 2020-10-21 | End: 2020-10-23 | Stop reason: HOSPADM

## 2020-10-21 RX ORDER — HYDROMORPHONE HYDROCHLORIDE 1 MG/ML
0.25 INJECTION, SOLUTION INTRAMUSCULAR; INTRAVENOUS; SUBCUTANEOUS EVERY 5 MIN PRN
Status: DISCONTINUED | OUTPATIENT
Start: 2020-10-21 | End: 2020-10-21 | Stop reason: HOSPADM

## 2020-10-21 RX ORDER — SODIUM CHLORIDE 0.9 % (FLUSH) 0.9 %
10 SYRINGE (ML) INJECTION PRN
Status: DISCONTINUED | OUTPATIENT
Start: 2020-10-21 | End: 2020-10-23 | Stop reason: HOSPADM

## 2020-10-21 RX ORDER — KETAMINE HYDROCHLORIDE 100 MG/ML
INJECTION, SOLUTION INTRAMUSCULAR; INTRAVENOUS PRN
Status: DISCONTINUED | OUTPATIENT
Start: 2020-10-21 | End: 2020-10-21 | Stop reason: SDUPTHER

## 2020-10-21 RX ORDER — HYDROMORPHONE HYDROCHLORIDE 1 MG/ML
2 INJECTION, SOLUTION INTRAMUSCULAR; INTRAVENOUS; SUBCUTANEOUS
Status: DISCONTINUED | OUTPATIENT
Start: 2020-10-21 | End: 2020-10-23 | Stop reason: HOSPADM

## 2020-10-21 RX ORDER — ONDANSETRON 2 MG/ML
4 INJECTION INTRAMUSCULAR; INTRAVENOUS EVERY 6 HOURS PRN
Status: DISCONTINUED | OUTPATIENT
Start: 2020-10-21 | End: 2020-10-23 | Stop reason: HOSPADM

## 2020-10-21 RX ORDER — ROCURONIUM BROMIDE 10 MG/ML
INJECTION, SOLUTION INTRAVENOUS PRN
Status: DISCONTINUED | OUTPATIENT
Start: 2020-10-21 | End: 2020-10-21 | Stop reason: SDUPTHER

## 2020-10-21 RX ORDER — SODIUM CHLORIDE 9 MG/ML
INJECTION, SOLUTION INTRAVENOUS CONTINUOUS
Status: DISCONTINUED | OUTPATIENT
Start: 2020-10-21 | End: 2020-10-21

## 2020-10-21 RX ORDER — HYDROMORPHONE HYDROCHLORIDE 1 MG/ML
1 INJECTION, SOLUTION INTRAMUSCULAR; INTRAVENOUS; SUBCUTANEOUS
Status: DISCONTINUED | OUTPATIENT
Start: 2020-10-21 | End: 2020-10-23 | Stop reason: HOSPADM

## 2020-10-21 RX ORDER — SODIUM CHLORIDE 0.9 % (FLUSH) 0.9 %
10 SYRINGE (ML) INJECTION PRN
Status: DISCONTINUED | OUTPATIENT
Start: 2020-10-21 | End: 2020-10-21 | Stop reason: HOSPADM

## 2020-10-21 RX ORDER — CLINDAMYCIN PHOSPHATE 900 MG/50ML
900 INJECTION INTRAVENOUS EVERY 8 HOURS
Status: COMPLETED | OUTPATIENT
Start: 2020-10-21 | End: 2020-10-23

## 2020-10-21 RX ORDER — OXYCODONE HYDROCHLORIDE 5 MG/1
20 TABLET ORAL EVERY 4 HOURS PRN
Status: DISCONTINUED | OUTPATIENT
Start: 2020-10-21 | End: 2020-10-23 | Stop reason: HOSPADM

## 2020-10-21 RX ORDER — SENNA AND DOCUSATE SODIUM 50; 8.6 MG/1; MG/1
1 TABLET, FILM COATED ORAL 2 TIMES DAILY
Status: DISCONTINUED | OUTPATIENT
Start: 2020-10-21 | End: 2020-10-23 | Stop reason: HOSPADM

## 2020-10-21 RX ORDER — LABETALOL HYDROCHLORIDE 5 MG/ML
5 INJECTION, SOLUTION INTRAVENOUS EVERY 10 MIN PRN
Status: DISCONTINUED | OUTPATIENT
Start: 2020-10-21 | End: 2020-10-21 | Stop reason: HOSPADM

## 2020-10-21 RX ORDER — OXYCODONE HCL 10 MG/1
10 TABLET, FILM COATED, EXTENDED RELEASE ORAL ONCE
Status: COMPLETED | OUTPATIENT
Start: 2020-10-21 | End: 2020-10-21

## 2020-10-21 RX ORDER — EPHEDRINE SULFATE 50 MG/ML
INJECTION, SOLUTION INTRAVENOUS PRN
Status: DISCONTINUED | OUTPATIENT
Start: 2020-10-21 | End: 2020-10-21 | Stop reason: SDUPTHER

## 2020-10-21 RX ORDER — ACETAMINOPHEN 500 MG
1000 TABLET ORAL ONCE
Status: COMPLETED | OUTPATIENT
Start: 2020-10-21 | End: 2020-10-21

## 2020-10-21 RX ORDER — OXYCODONE HYDROCHLORIDE 5 MG/1
5 TABLET ORAL EVERY 4 HOURS PRN
Status: DISCONTINUED | OUTPATIENT
Start: 2020-10-21 | End: 2020-10-21

## 2020-10-21 RX ORDER — METOCLOPRAMIDE HYDROCHLORIDE 5 MG/ML
10 INJECTION INTRAMUSCULAR; INTRAVENOUS
Status: DISCONTINUED | OUTPATIENT
Start: 2020-10-21 | End: 2020-10-21 | Stop reason: HOSPADM

## 2020-10-21 RX ORDER — TRAMADOL HYDROCHLORIDE 50 MG/1
50 TABLET ORAL EVERY 6 HOURS
Status: DISCONTINUED | OUTPATIENT
Start: 2020-10-21 | End: 2020-10-23 | Stop reason: HOSPADM

## 2020-10-21 RX ORDER — ONDANSETRON 2 MG/ML
INJECTION INTRAMUSCULAR; INTRAVENOUS PRN
Status: DISCONTINUED | OUTPATIENT
Start: 2020-10-21 | End: 2020-10-21 | Stop reason: SDUPTHER

## 2020-10-21 RX ORDER — 0.9 % SODIUM CHLORIDE 0.9 %
500 INTRAVENOUS SOLUTION INTRAVENOUS PRN
Status: DISCONTINUED | OUTPATIENT
Start: 2020-10-21 | End: 2020-10-23 | Stop reason: HOSPADM

## 2020-10-21 RX ORDER — OXYCODONE HYDROCHLORIDE 5 MG/1
5 TABLET ORAL EVERY 4 HOURS PRN
Status: DISCONTINUED | OUTPATIENT
Start: 2020-10-21 | End: 2020-10-23 | Stop reason: HOSPADM

## 2020-10-21 RX ORDER — BUPIVACAINE HYDROCHLORIDE 2.5 MG/ML
INJECTION, SOLUTION INFILTRATION; PERINEURAL PRN
Status: DISCONTINUED | OUTPATIENT
Start: 2020-10-21 | End: 2020-10-21 | Stop reason: HOSPADM

## 2020-10-21 RX ORDER — MORPHINE SULFATE 4 MG/ML
4 INJECTION, SOLUTION INTRAMUSCULAR; INTRAVENOUS EVERY 5 MIN PRN
Status: DISCONTINUED | OUTPATIENT
Start: 2020-10-21 | End: 2020-10-21 | Stop reason: HOSPADM

## 2020-10-21 RX ORDER — ONDANSETRON 4 MG/1
4 TABLET, ORALLY DISINTEGRATING ORAL EVERY 8 HOURS PRN
Status: DISCONTINUED | OUTPATIENT
Start: 2020-10-21 | End: 2020-10-23 | Stop reason: HOSPADM

## 2020-10-21 RX ORDER — FENTANYL CITRATE 50 UG/ML
25 INJECTION, SOLUTION INTRAMUSCULAR; INTRAVENOUS
Status: DISCONTINUED | OUTPATIENT
Start: 2020-10-21 | End: 2020-10-21 | Stop reason: HOSPADM

## 2020-10-21 RX ORDER — HYDROMORPHONE HYDROCHLORIDE 1 MG/ML
0.5 INJECTION, SOLUTION INTRAMUSCULAR; INTRAVENOUS; SUBCUTANEOUS
Status: DISCONTINUED | OUTPATIENT
Start: 2020-10-21 | End: 2020-10-21

## 2020-10-21 RX ORDER — FENTANYL CITRATE 50 UG/ML
50 INJECTION, SOLUTION INTRAMUSCULAR; INTRAVENOUS
Status: DISCONTINUED | OUTPATIENT
Start: 2020-10-21 | End: 2020-10-21 | Stop reason: HOSPADM

## 2020-10-21 RX ORDER — MORPHINE SULFATE 4 MG/ML
2 INJECTION, SOLUTION INTRAMUSCULAR; INTRAVENOUS EVERY 5 MIN PRN
Status: DISCONTINUED | OUTPATIENT
Start: 2020-10-21 | End: 2020-10-21 | Stop reason: HOSPADM

## 2020-10-21 RX ORDER — LIDOCAINE HYDROCHLORIDE 10 MG/ML
INJECTION, SOLUTION EPIDURAL; INFILTRATION; INTRACAUDAL; PERINEURAL PRN
Status: DISCONTINUED | OUTPATIENT
Start: 2020-10-21 | End: 2020-10-21 | Stop reason: SDUPTHER

## 2020-10-21 RX ORDER — HYDROMORPHONE HYDROCHLORIDE 1 MG/ML
1 INJECTION, SOLUTION INTRAMUSCULAR; INTRAVENOUS; SUBCUTANEOUS
Status: DISCONTINUED | OUTPATIENT
Start: 2020-10-21 | End: 2020-10-21

## 2020-10-21 RX ORDER — OXYCODONE HCL 10 MG/1
10 TABLET, FILM COATED, EXTENDED RELEASE ORAL EVERY 12 HOURS SCHEDULED
Status: DISCONTINUED | OUTPATIENT
Start: 2020-10-21 | End: 2020-10-23 | Stop reason: HOSPADM

## 2020-10-21 RX ORDER — MEPERIDINE HYDROCHLORIDE 50 MG/ML
12.5 INJECTION INTRAMUSCULAR; INTRAVENOUS; SUBCUTANEOUS EVERY 5 MIN PRN
Status: DISCONTINUED | OUTPATIENT
Start: 2020-10-21 | End: 2020-10-21 | Stop reason: HOSPADM

## 2020-10-21 RX ORDER — FENTANYL CITRATE 50 UG/ML
INJECTION, SOLUTION INTRAMUSCULAR; INTRAVENOUS PRN
Status: DISCONTINUED | OUTPATIENT
Start: 2020-10-21 | End: 2020-10-21 | Stop reason: SDUPTHER

## 2020-10-21 RX ORDER — BETAMETHASONE SODIUM PHOSPHATE AND BETAMETHASONE ACETATE 3; 3 MG/ML; MG/ML
INJECTION, SUSPENSION INTRA-ARTICULAR; INTRALESIONAL; INTRAMUSCULAR; SOFT TISSUE PRN
Status: DISCONTINUED | OUTPATIENT
Start: 2020-10-21 | End: 2020-10-21 | Stop reason: HOSPADM

## 2020-10-21 RX ORDER — HYDROMORPHONE HYDROCHLORIDE 1 MG/ML
0.5 INJECTION, SOLUTION INTRAMUSCULAR; INTRAVENOUS; SUBCUTANEOUS
Status: DISCONTINUED | OUTPATIENT
Start: 2020-10-21 | End: 2020-10-23 | Stop reason: HOSPADM

## 2020-10-21 RX ORDER — ENALAPRILAT 2.5 MG/2ML
1.25 INJECTION INTRAVENOUS
Status: DISCONTINUED | OUTPATIENT
Start: 2020-10-21 | End: 2020-10-21 | Stop reason: HOSPADM

## 2020-10-21 RX ORDER — SODIUM CHLORIDE 0.9 % (FLUSH) 0.9 %
10 SYRINGE (ML) INJECTION EVERY 12 HOURS SCHEDULED
Status: DISCONTINUED | OUTPATIENT
Start: 2020-10-21 | End: 2020-10-21 | Stop reason: HOSPADM

## 2020-10-21 RX ORDER — DEXAMETHASONE SODIUM PHOSPHATE 10 MG/ML
INJECTION, SOLUTION INTRAMUSCULAR; INTRAVENOUS PRN
Status: DISCONTINUED | OUTPATIENT
Start: 2020-10-21 | End: 2020-10-21 | Stop reason: SDUPTHER

## 2020-10-21 RX ORDER — HYDROMORPHONE HYDROCHLORIDE 1 MG/ML
0.5 INJECTION, SOLUTION INTRAMUSCULAR; INTRAVENOUS; SUBCUTANEOUS EVERY 5 MIN PRN
Status: DISCONTINUED | OUTPATIENT
Start: 2020-10-21 | End: 2020-10-21 | Stop reason: HOSPADM

## 2020-10-21 RX ORDER — SODIUM CHLORIDE, SODIUM LACTATE, POTASSIUM CHLORIDE, CALCIUM CHLORIDE 600; 310; 30; 20 MG/100ML; MG/100ML; MG/100ML; MG/100ML
INJECTION, SOLUTION INTRAVENOUS CONTINUOUS
Status: DISCONTINUED | OUTPATIENT
Start: 2020-10-21 | End: 2020-10-21

## 2020-10-21 RX ORDER — DEXAMETHASONE SODIUM PHOSPHATE 10 MG/ML
10 INJECTION, SOLUTION INTRAMUSCULAR; INTRAVENOUS ONCE
Status: DISCONTINUED | OUTPATIENT
Start: 2020-10-21 | End: 2020-10-21 | Stop reason: HOSPADM

## 2020-10-21 RX ORDER — OXYCODONE HYDROCHLORIDE 5 MG/1
10 TABLET ORAL EVERY 4 HOURS PRN
Status: DISCONTINUED | OUTPATIENT
Start: 2020-10-21 | End: 2020-10-21

## 2020-10-21 RX ORDER — TRIAMTERENE AND HYDROCHLOROTHIAZIDE 37.5; 25 MG/1; MG/1
1 TABLET ORAL DAILY
Status: DISCONTINUED | OUTPATIENT
Start: 2020-10-22 | End: 2020-10-23 | Stop reason: HOSPADM

## 2020-10-21 RX ORDER — PROMETHAZINE HYDROCHLORIDE 25 MG/ML
6.25 INJECTION, SOLUTION INTRAMUSCULAR; INTRAVENOUS
Status: DISCONTINUED | OUTPATIENT
Start: 2020-10-21 | End: 2020-10-21 | Stop reason: HOSPADM

## 2020-10-21 RX ORDER — DIPHENHYDRAMINE HYDROCHLORIDE 50 MG/ML
12.5 INJECTION INTRAMUSCULAR; INTRAVENOUS
Status: DISCONTINUED | OUTPATIENT
Start: 2020-10-21 | End: 2020-10-21 | Stop reason: HOSPADM

## 2020-10-21 RX ORDER — OXYCODONE HYDROCHLORIDE 5 MG/1
10 TABLET ORAL EVERY 4 HOURS PRN
Status: DISCONTINUED | OUTPATIENT
Start: 2020-10-21 | End: 2020-10-23 | Stop reason: HOSPADM

## 2020-10-21 RX ORDER — HYDRALAZINE HYDROCHLORIDE 20 MG/ML
5 INJECTION INTRAMUSCULAR; INTRAVENOUS EVERY 10 MIN PRN
Status: DISCONTINUED | OUTPATIENT
Start: 2020-10-21 | End: 2020-10-21 | Stop reason: HOSPADM

## 2020-10-21 RX ORDER — SODIUM CHLORIDE 0.9 % (FLUSH) 0.9 %
10 SYRINGE (ML) INJECTION EVERY 12 HOURS SCHEDULED
Status: DISCONTINUED | OUTPATIENT
Start: 2020-10-21 | End: 2020-10-23 | Stop reason: HOSPADM

## 2020-10-21 RX ORDER — ACETAMINOPHEN 325 MG/1
650 TABLET ORAL EVERY 6 HOURS
Status: DISCONTINUED | OUTPATIENT
Start: 2020-10-21 | End: 2020-10-23 | Stop reason: HOSPADM

## 2020-10-21 RX ORDER — CELECOXIB 200 MG/1
200 CAPSULE ORAL ONCE
Status: COMPLETED | OUTPATIENT
Start: 2020-10-21 | End: 2020-10-21

## 2020-10-21 RX ORDER — ROPIVACAINE HYDROCHLORIDE 5 MG/ML
INJECTION, SOLUTION EPIDURAL; INFILTRATION; PERINEURAL
Status: COMPLETED | OUTPATIENT
Start: 2020-10-21 | End: 2020-10-21

## 2020-10-21 RX ORDER — PROPOFOL 10 MG/ML
INJECTION, EMULSION INTRAVENOUS PRN
Status: DISCONTINUED | OUTPATIENT
Start: 2020-10-21 | End: 2020-10-21 | Stop reason: SDUPTHER

## 2020-10-21 RX ORDER — MIDAZOLAM HYDROCHLORIDE 1 MG/ML
2 INJECTION INTRAMUSCULAR; INTRAVENOUS
Status: COMPLETED | OUTPATIENT
Start: 2020-10-21 | End: 2020-10-21

## 2020-10-21 RX ORDER — DIAZEPAM 5 MG/1
5 TABLET ORAL EVERY 6 HOURS PRN
Status: DISCONTINUED | OUTPATIENT
Start: 2020-10-21 | End: 2020-10-23 | Stop reason: HOSPADM

## 2020-10-21 RX ORDER — PREGABALIN 75 MG/1
75 CAPSULE ORAL ONCE
Status: COMPLETED | OUTPATIENT
Start: 2020-10-21 | End: 2020-10-21

## 2020-10-21 RX ORDER — TAMSULOSIN HYDROCHLORIDE 0.4 MG/1
0.4 CAPSULE ORAL DAILY
Status: DISCONTINUED | OUTPATIENT
Start: 2020-10-21 | End: 2020-10-23 | Stop reason: HOSPADM

## 2020-10-21 RX ADMIN — PROPOFOL 200 MG: 10 INJECTION, EMULSION INTRAVENOUS at 14:15

## 2020-10-21 RX ADMIN — EPHEDRINE SULFATE 10 MG: 50 INJECTION INTRAMUSCULAR; INTRAVENOUS; SUBCUTANEOUS at 14:34

## 2020-10-21 RX ADMIN — ACETAMINOPHEN 650 MG: 325 TABLET ORAL at 22:27

## 2020-10-21 RX ADMIN — DOCUSATE SODIUM 50 MG AND SENNOSIDES 8.6 MG 1 TABLET: 8.6; 5 TABLET, FILM COATED ORAL at 22:27

## 2020-10-21 RX ADMIN — FENTANYL CITRATE 100 MCG: 50 INJECTION INTRAMUSCULAR; INTRAVENOUS at 14:02

## 2020-10-21 RX ADMIN — ROPIVACAINE HYDROCHLORIDE 20 ML: 5 INJECTION, SOLUTION EPIDURAL; INFILTRATION; PERINEURAL at 13:16

## 2020-10-21 RX ADMIN — Medication 2 G: at 14:19

## 2020-10-21 RX ADMIN — HYDROMORPHONE HYDROCHLORIDE 0.5 MG: 1 INJECTION, SOLUTION INTRAMUSCULAR; INTRAVENOUS; SUBCUTANEOUS at 16:48

## 2020-10-21 RX ADMIN — RIVAROXABAN 10 MG: 10 TABLET, FILM COATED ORAL at 23:45

## 2020-10-21 RX ADMIN — ACETAMINOPHEN 650 MG: 325 TABLET ORAL at 18:05

## 2020-10-21 RX ADMIN — TAMSULOSIN HYDROCHLORIDE 0.4 MG: 0.4 CAPSULE ORAL at 18:05

## 2020-10-21 RX ADMIN — TRANEXAMIC ACID 1000 MG: 100 INJECTION, SOLUTION INTRAVENOUS at 14:19

## 2020-10-21 RX ADMIN — TRAMADOL HYDROCHLORIDE 50 MG: 50 TABLET, FILM COATED ORAL at 22:28

## 2020-10-21 RX ADMIN — TRAMADOL HYDROCHLORIDE 50 MG: 50 TABLET, FILM COATED ORAL at 18:05

## 2020-10-21 RX ADMIN — ROCURONIUM BROMIDE 20 MG: 10 INJECTION, SOLUTION INTRAVENOUS at 14:36

## 2020-10-21 RX ADMIN — ROCURONIUM BROMIDE 50 MG: 10 INJECTION, SOLUTION INTRAVENOUS at 14:17

## 2020-10-21 RX ADMIN — ROCURONIUM BROMIDE 10 MG: 10 INJECTION, SOLUTION INTRAVENOUS at 15:22

## 2020-10-21 RX ADMIN — CLINDAMYCIN IN 5 PERCENT DEXTROSE 900 MG: 18 INJECTION, SOLUTION INTRAVENOUS at 18:05

## 2020-10-21 RX ADMIN — ONDANSETRON HYDROCHLORIDE 4 MG: 2 INJECTION, SOLUTION INTRAMUSCULAR; INTRAVENOUS at 15:48

## 2020-10-21 RX ADMIN — TRANEXAMIC ACID 1000 MG: 100 INJECTION, SOLUTION INTRAVENOUS at 15:45

## 2020-10-21 RX ADMIN — CELECOXIB 200 MG: 200 CAPSULE ORAL at 12:12

## 2020-10-21 RX ADMIN — DEXAMETHASONE SODIUM PHOSPHATE 10 MG: 10 INJECTION, SOLUTION INTRAMUSCULAR; INTRAVENOUS at 14:20

## 2020-10-21 RX ADMIN — FENTANYL CITRATE 50 MCG: 50 INJECTION INTRAMUSCULAR; INTRAVENOUS at 14:41

## 2020-10-21 RX ADMIN — HYDROMORPHONE HYDROCHLORIDE 0.5 MG: 1 INJECTION, SOLUTION INTRAMUSCULAR; INTRAVENOUS; SUBCUTANEOUS at 16:40

## 2020-10-21 RX ADMIN — PREGABALIN 75 MG: 75 CAPSULE ORAL at 12:11

## 2020-10-21 RX ADMIN — LIDOCAINE HYDROCHLORIDE 50 MG: 10 INJECTION, SOLUTION EPIDURAL; INFILTRATION; INTRACAUDAL; PERINEURAL at 14:15

## 2020-10-21 RX ADMIN — OXYCODONE HYDROCHLORIDE 10 MG: 10 TABLET, FILM COATED, EXTENDED RELEASE ORAL at 22:28

## 2020-10-21 RX ADMIN — MIDAZOLAM 2 MG: 1 INJECTION INTRAMUSCULAR; INTRAVENOUS at 13:12

## 2020-10-21 RX ADMIN — ACETAMINOPHEN 1000 MG: 500 TABLET, FILM COATED ORAL at 12:12

## 2020-10-21 RX ADMIN — CEFAZOLIN SODIUM 3 G: 10 INJECTION, POWDER, FOR SOLUTION INTRAVENOUS at 23:48

## 2020-10-21 RX ADMIN — SODIUM CHLORIDE, SODIUM LACTATE, POTASSIUM CHLORIDE, AND CALCIUM CHLORIDE: 600; 310; 30; 20 INJECTION, SOLUTION INTRAVENOUS at 14:50

## 2020-10-21 RX ADMIN — OXYCODONE 10 MG: 5 TABLET ORAL at 17:47

## 2020-10-21 RX ADMIN — SUGAMMADEX 250 MG: 100 INJECTION, SOLUTION INTRAVENOUS at 16:01

## 2020-10-21 RX ADMIN — OXYCODONE HYDROCHLORIDE 10 MG: 10 TABLET, FILM COATED, EXTENDED RELEASE ORAL at 12:12

## 2020-10-21 RX ADMIN — SODIUM CHLORIDE, SODIUM LACTATE, POTASSIUM CHLORIDE, AND CALCIUM CHLORIDE: 600; 310; 30; 20 INJECTION, SOLUTION INTRAVENOUS at 12:13

## 2020-10-21 RX ADMIN — HYDROMORPHONE HYDROCHLORIDE 1 MG: 1 INJECTION, SOLUTION INTRAMUSCULAR; INTRAVENOUS; SUBCUTANEOUS at 19:32

## 2020-10-21 RX ADMIN — SODIUM CHLORIDE: 9 INJECTION, SOLUTION INTRAVENOUS at 18:11

## 2020-10-21 RX ADMIN — Medication 20 MG: at 14:15

## 2020-10-21 RX ADMIN — Medication 10 MG: at 14:40

## 2020-10-21 ASSESSMENT — PAIN SCALES - GENERAL
PAINLEVEL_OUTOF10: 7
PAINLEVEL_OUTOF10: 5
PAINLEVEL_OUTOF10: 1
PAINLEVEL_OUTOF10: 4
PAINLEVEL_OUTOF10: 5
PAINLEVEL_OUTOF10: 7
PAINLEVEL_OUTOF10: 7

## 2020-10-21 ASSESSMENT — ENCOUNTER SYMPTOMS: SHORTNESS OF BREATH: 0

## 2020-10-21 ASSESSMENT — PAIN - FUNCTIONAL ASSESSMENT: PAIN_FUNCTIONAL_ASSESSMENT: 0-10

## 2020-10-21 ASSESSMENT — LIFESTYLE VARIABLES: SMOKING_STATUS: 0

## 2020-10-21 NOTE — OP NOTE
[facility]                 [address] [address]                                OPERATIVE REPORT    PATIENT NAME: Cheri Rico                    :        1955  MED REC NO:   [id number]                              ROOM:       [facility]  ACCOUNT NO:   [de-identified]                           ADMIT DATE: 10/21/2020  PROVIDER:     Claudia Hale MD    DATE OF PROCEDURE:  10/21/2020    PREOPERATIVE DIAGNOSES:  1. Primary osteoarthritis, right knee with severe varus deformity. 2.  Elevated BMI of 41.01.  3.  R long trigger finger    POSTOPERATIVE DIAGNOSES:  1. Primary osteoarthritis, right knee with severe varus deformity. 2.  Elevated BMI of 41.01.  3.  R long trigger finger    PROCEDURES:  1. Complex primary right total knee replacement. 2.  Reduction osteotomy to correct varus deformity, right knee. 3.  R long finger A1 pulley injection    Please note, complexity of the surgery is due to the fact that the  patient had severe varus deformity and elevated BMI. This added 100%  increase in difficulty and length of the case and technical difficulty. SURGEON:  Claudia Hale MD    FIRST SURGICAL ASSISTANT:  Sulema Aldridge PA-C. Please note, he is a  critical assistant in exposure and placement of implants. ANESTHESIA:  General with adductor canal block. EBL:  80 mL. FLUIDS:  1200 mL of crystalloid. TOURNIQUET TIME:  70 minutes. COMPONENTS USED:  Jovita Persona knee system, femur size 10 standard,  tibia size H with a 14 x 30 stem extension, polyethylene size 10,  patella size 41. INDICATIONS:  This is a 68-year-old gentleman with severe arthritis of  the right knee, failing conservative care. Because of this, he elected  for the above procedure. OPERATIVE PROCEDURE:  After informed consent, he was given 2 gm of  Ancef, 1 gm of tranexamic acid, underwent adductor canal block and  general anesthetic.   Tourniquet was placed around the right upper thigh. Left leg was placed in SCD. Right leg was prepped and draped in the  usual sterile fashion. The leg was Esmarched. Tourniquet was inflated  to 300 mmHg. A midline incision was made. Parapatellar approach was  made in the knee. Prepatellar fat pad was partially excised. Intramedullary canal of the femur was drilled into. Our distal  resection was made in 4 degrees of valgus taking a +3 cut. Our medial  resection was 13 mm, lateral resection was 13 mm. We then _____ sized  to about a 10 femur, but we used our size 11 block and placed this in 5  degrees of external rotation. Our anterior cut was made. We moved our  block down posteriorly by 2 mm and then used our 10 cutting block. Anterior, posterior, and chamfer cuts were made. Posteromedial cut was  9.5 mm. Posterolateral cut was 3.5 mm. The tibia was exposed, which  was a difficult exposure. We used our 7-degree cutting guide and placed  this in line with the anterior tibial crest proximally and the middle of  the ankle distally and tibial resection was made. We still had a fixed  varus deformity, so a release was done around the proximal medial tibial  plateau. We sized the tibia to a size H tibia, which was placed in line  with the anterior tibial crest and lateralized. We performed a  reduction osteotomy to release some of the medial tightness. Following  this, our CPS box cut was made and lug holes were drilled and trial  reduction was done. This showed excellent flexion and extension and a  very well balanced and stable knee. Please note that prior to cutting  the tibia, we did address the patella. The patella measured 30 mm in  thickness. Patellar reaming system was used. We sized this to a size  41 mm patella. Lug holes were drilled and the trial button fit nicely. We also removed two very large suprapatellar osteophytes.   The tibia was  then finished with a Collet drill and keel punch, and we drilled for  stem extension as the patient had a BMI of over 40. We then mixed 20 mL  of Exparel with 30 mL of saline and 50 mL of 0.25% Marcaine. We  injected the posteromedial capsule with 40 mL and 60 mL in the  subcutaneous tissues. We then irrigated with 1500 mL of normal saline  and dried the bone. Two batches of Palacos G cement were mixed. The  size H tibia was cemented followed by the size 10 standard femoral  component. Trial liner was placed and the size 41 mm patella was  cemented. Once the cement had hardened, tourniquet was released and  hemostasis was achieved. Please note, we also placed a cement  restrictor down on the tibial canal prior to cementing. Once the  hemostasis was achieved, the final size 10 liner was locked in the  tibial tray. We had full extension of the knee and flexion limited only  by his thigh touching his calf with fairly good midline tracking of the  patella and very well balanced and stable knee. Parapatellar approach  was then closed with interrupted #1 Vicryl suture, 2-0 Vicryl suture for  subcutaneous tissues, and 2-0 Vicryl for subcuticular stitch. Prineo  Dermabond and soft dressings were applied. Following this, the R long finger was prepped with alcohol. The A1 pulley was injected with 3mg of celestone and 0.5cc of 0.5% marcaine for a total of 1cc. Done with a 25g needle. The patient was taken to the  recovery room in stable condition. POSTOPERATIVE PLANS:  1. He will be on our typical total knee arthroplasty protocol. 2.  He will be on 2 doses of Ancef and 6 doses of clindamycin. 3.  He will be on Xarelto 10 mg a day for 21 days followed by  enteric-coated aspirin 325 mg twice a day for another 3 weeks. Please  note, the CPS box was cut, but not used. Also note, his preoperative  range of motion to the right knee was 16 to 98 degrees and he also had a  13-degree varus deformity of the right lower extremity on x-ray.         Carlos Jones MD    D: 10/21/2020 17:24:32      T: 10/21/2020 18:00:54     [email or web address]  Job#: [id number]     Doc#: [id number]    CC:

## 2020-10-21 NOTE — H&P
I have reviewed the history and physical for planned procedure. I have re-examined the patient and there are no changes to the history and physical unless noted below.     Electronically signed by Rossy Chou MD on 10/21/2020 at 11:43 AM

## 2020-10-21 NOTE — ANESTHESIA POSTPROCEDURE EVALUATION
Department of Anesthesiology  Postprocedure Note    Patient: Lucio Garvin  MRN: [id number]  YOB: 1955  Date of evaluation: 10/21/2020  Time:  4:37 PM     Procedure Summary     Date:  10/21/20 Room / Location:  [address]    Anesthesia Start:  7811 Anesthesia Stop:  1637    Procedures:       RIGHT TOTAL KNEE REPLACEMENT, (Right )      RIGHT LONG TRIGGER FINGER INJECTION  (Right ) Diagnosis:  (M17.0, M65.331)    Surgeon:  Guerda Pizano MD Responsible Provider:  JACINTA Rivero CRNA    Anesthesia Type:  general, regional ASA Status:  2          Anesthesia Type: general, regional    Cheyenne Phase I: Cheyenne Score: 10    Cheyenne Phase II:      Last vitals: Reviewed and per EMR flowsheets.        Anesthesia Post Evaluation    Patient location during evaluation: PACU  Patient participation: complete - patient participated  Level of consciousness: awake and alert  Pain score: 0  Airway patency: patent  Nausea & Vomiting: no nausea and no vomiting  Complications: no  Cardiovascular status: hemodynamically stable and blood pressure returned to baseline  Respiratory status: acceptable and room air  Hydration status: stable
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
## 2020-10-22 LAB
ANION GAP SERPL CALCULATED.3IONS-SCNC: 16 MMOL/L (ref 7–19)
BUN BLDV-MCNC: 16 MG/DL (ref 8–23)
CALCIUM SERPL-MCNC: 8.5 MG/DL (ref 8.8–10.2)
CHLORIDE BLD-SCNC: 102 MMOL/L (ref 98–111)
CO2: 21 MMOL/L (ref 22–29)
CREAT SERPL-MCNC: 0.8 MG/DL (ref 0.5–1.2)
GFR AFRICAN AMERICAN: >59
GFR NON-AFRICAN AMERICAN: >60
GLUCOSE BLD-MCNC: 183 MG/DL (ref 74–109)
HCT VFR BLD CALC: 40.5 % (ref 42–52)
HEMOGLOBIN: 13.2 G/DL (ref 14–18)
POTASSIUM REFLEX MAGNESIUM: 4.5 MMOL/L (ref 3.5–5)
SODIUM BLD-SCNC: 139 MMOL/L (ref 136–145)

## 2020-10-22 PROCEDURE — 6360000002 HC RX W HCPCS: Performed by: ORTHOPAEDIC SURGERY

## 2020-10-22 PROCEDURE — 97161 PT EVAL LOW COMPLEX 20 MIN: CPT

## 2020-10-22 PROCEDURE — 85014 HEMATOCRIT: CPT

## 2020-10-22 PROCEDURE — 2580000003 HC RX 258: Performed by: ORTHOPAEDIC SURGERY

## 2020-10-22 PROCEDURE — 97116 GAIT TRAINING THERAPY: CPT

## 2020-10-22 PROCEDURE — 1210000000 HC MED SURG R&B

## 2020-10-22 PROCEDURE — 85018 HEMOGLOBIN: CPT

## 2020-10-22 PROCEDURE — 36415 COLL VENOUS BLD VENIPUNCTURE: CPT

## 2020-10-22 PROCEDURE — 80048 BASIC METABOLIC PNL TOTAL CA: CPT

## 2020-10-22 PROCEDURE — 2700000000 HC OXYGEN THERAPY PER DAY

## 2020-10-22 PROCEDURE — 2500000003 HC RX 250 WO HCPCS: Performed by: ORTHOPAEDIC SURGERY

## 2020-10-22 PROCEDURE — 6370000000 HC RX 637 (ALT 250 FOR IP): Performed by: ORTHOPAEDIC SURGERY

## 2020-10-22 RX ADMIN — RIVAROXABAN 10 MG: 10 TABLET, FILM COATED ORAL at 18:18

## 2020-10-22 RX ADMIN — OXYCODONE 10 MG: 5 TABLET ORAL at 13:12

## 2020-10-22 RX ADMIN — TRAMADOL HYDROCHLORIDE 50 MG: 50 TABLET, FILM COATED ORAL at 05:51

## 2020-10-22 RX ADMIN — POLYETHYLENE GLYCOL 3350 17 G: 17 POWDER, FOR SOLUTION ORAL at 08:18

## 2020-10-22 RX ADMIN — OXYCODONE HYDROCHLORIDE 10 MG: 10 TABLET, FILM COATED, EXTENDED RELEASE ORAL at 08:18

## 2020-10-22 RX ADMIN — CLINDAMYCIN IN 5 PERCENT DEXTROSE 900 MG: 18 INJECTION, SOLUTION INTRAVENOUS at 11:30

## 2020-10-22 RX ADMIN — TRAMADOL HYDROCHLORIDE 50 MG: 50 TABLET, FILM COATED ORAL at 18:18

## 2020-10-22 RX ADMIN — CLINDAMYCIN IN 5 PERCENT DEXTROSE 900 MG: 18 INJECTION, SOLUTION INTRAVENOUS at 18:18

## 2020-10-22 RX ADMIN — ACETAMINOPHEN 650 MG: 325 TABLET ORAL at 18:18

## 2020-10-22 RX ADMIN — CLINDAMYCIN IN 5 PERCENT DEXTROSE 900 MG: 18 INJECTION, SOLUTION INTRAVENOUS at 01:53

## 2020-10-22 RX ADMIN — HYDROMORPHONE HYDROCHLORIDE 1 MG: 1 INJECTION, SOLUTION INTRAMUSCULAR; INTRAVENOUS; SUBCUTANEOUS at 15:56

## 2020-10-22 RX ADMIN — ACETAMINOPHEN 650 MG: 325 TABLET ORAL at 05:51

## 2020-10-22 RX ADMIN — CEFAZOLIN SODIUM 3 G: 10 INJECTION, POWDER, FOR SOLUTION INTRAVENOUS at 05:52

## 2020-10-22 RX ADMIN — DOCUSATE SODIUM 50 MG AND SENNOSIDES 8.6 MG 1 TABLET: 8.6; 5 TABLET, FILM COATED ORAL at 21:09

## 2020-10-22 RX ADMIN — TAMSULOSIN HYDROCHLORIDE 0.4 MG: 0.4 CAPSULE ORAL at 08:18

## 2020-10-22 RX ADMIN — OXYCODONE HYDROCHLORIDE 10 MG: 10 TABLET, FILM COATED, EXTENDED RELEASE ORAL at 21:08

## 2020-10-22 RX ADMIN — TRIAMTERENE AND HYDROCHLOROTHIAZIDE 1 TABLET: 37.5; 25 TABLET ORAL at 08:18

## 2020-10-22 RX ADMIN — DOCUSATE SODIUM 50 MG AND SENNOSIDES 8.6 MG 1 TABLET: 8.6; 5 TABLET, FILM COATED ORAL at 08:18

## 2020-10-22 ASSESSMENT — PAIN SCALES - GENERAL
PAINLEVEL_OUTOF10: 4
PAINLEVEL_OUTOF10: 7
PAINLEVEL_OUTOF10: 7
PAINLEVEL_OUTOF10: 4
PAINLEVEL_OUTOF10: 3
PAINLEVEL_OUTOF10: 6
PAINLEVEL_OUTOF10: 6

## 2020-10-22 ASSESSMENT — PAIN DESCRIPTION - PAIN TYPE: TYPE: SURGICAL PAIN

## 2020-10-22 ASSESSMENT — PAIN DESCRIPTION - LOCATION: LOCATION: KNEE

## 2020-10-22 NOTE — CARE COORDINATION
Spoke with patient regarding MD orders for [facility] services. Patient agreeable and has chosen [facility]. Referral Faxed. [address] [place] [address] [phone]. [phone]. Please notify [facility] when patient discharges and fax DC Summary,  DC med list and any new [facility] orders. The Patient  was provided with a choice of provider and agrees with the discharge plan. [x] Yes [] No    Freedom of choice list was provided with basic dialogue that supports the patient's individualized plan of care/goals, treatment preferences and shares the quality data associated with the providers.  [x] Yes [] No  Electronically signed by Luiza Graham RN on 10/22/2020 at 3:32 PM

## 2020-10-22 NOTE — PROGRESS NOTES
Physical Therapy    Facility/Department: [facility]  Initial Assessment    NAME: Yogesh Weir  : 1955  MRN: [id number]    Date of Service: 10/22/2020    Discharge Recommendations:  Patient would benefit from continued therapy after discharge, Home independently        Assessment   Body structures, Functions, Activity limitations: Decreased functional mobility   Assessment: Patient will benefit from continuing skilled physical therapy to improve mobility  Treatment Diagnosis: decline in mobility  Prognosis: Good  Decision Making: Low Complexity  REQUIRES PT FOLLOW UP: Yes  Activity Tolerance  Activity Tolerance: Patient Tolerated treatment well       Patient Diagnosis(es): There were no encounter diagnoses. has a past medical history of Arthritis, Head injury, Heel spur, HTN (hypertension), Knee pain, and Seasonal allergies. has a past surgical history that includes Knee arthroscopy; Colonoscopy (2007); pr total knee arthroplasty (Left, 10/24/2018); Total knee arthroplasty (Right, 10/21/2020); and Finger trigger release (Right, 10/21/2020).     Restrictions     Vision/Hearing        Subjective  General  Chart Reviewed: Yes  Follows Commands: Within Functional Limits  Subjective  Subjective: Agrees to work with therapy          Orientation  Orientation  Overall Orientation Status: Within Normal Limits  Social/Functional History     Cognition        Objective          PROM RLE (degrees)  RLE General PROM: Knee - full extension in supine and flexion to 80 in sitting  PROM LLE (degrees)  LLE PROM: WFL  Strength RLE  Comment: Grossly 3-/5  Strength LLE  Strength LLE: WFL        Bed mobility  Supine to Sit: Modified independent  Sit to Supine: Modified independent  Scooting: Modified independent  Transfers  Sit to Stand: Contact guard assistance  Stand to sit: Contact guard assistance  Bed to Chair: Contact guard assistance  Ambulation  Ambulation?: Yes  WB Status: WBAT  Ambulation 1  Device: [address]: Contact guard assistance  Quality of Gait: Good  Distance: 25 feet     Balance  Posture: Good  Sitting - Static: Good  Sitting - Dynamic: Good  Standing - Static: Fair  Standing - Dynamic: [address]  Times per week: 7  Times per day: Daily  Plan weeks: 2  Current Treatment Recommendations: Functional Mobility Training, Stair training, Gait Training, Transfer Training  Safety Devices  Type of devices: Call light within reach, Gait belt, Left in chair, Nurse notified    G-Code       OutComes Score                                                  AM-PAC Score             Goals  Short term goals  Time Frame for Short term goals: 2 weeks  Short term goal 1: Independent with bed mobility  Short term goal 2:  Independent with transfers  Short term goal 3: Ambulate 400 feet independently with assistive device  Short term goal 4: Up and down 3 steps with hand rail and minimal assist of 1       Therapy Time   Individual Concurrent Group Co-treatment   Time In           Time Out           Minutes                   Sylvia Garvin PT       Electronically signed by Sylvia Garvin PT on 10/22/2020 at 9:31 AM

## 2020-10-22 NOTE — PROGRESS NOTES
Subjective:     Post-Operative Day: 1 Status Post right tka complex  Systemic or Specific Complaints:No Complaints  no nausea Pain 5    Objective:     Patient Vitals for the past 24 hrs:   BP Temp Temp src Pulse Resp SpO2 Height Weight   10/22/20 0654 118/73 97.3 °F (36.3 °C) Temporal 82 16 95 % -- --   10/22/20 0332 123/64 97.8 °F (36.6 °C) Temporal 81 16 91 % -- --   10/21/20 2321 123/65 98.1 °F (36.7 °C) Temporal 87 16 91 % -- --   10/21/20 1936 138/79 99 °F (37.2 °C) Temporal 86 18 92 % -- --   10/21/20 1715 (!) 142/71 -- -- 77 19 93 % -- --   10/21/20 1710 139/78 -- -- 77 15 94 % -- --   10/21/20 1705 (!) 156/88 97.9 °F (36.6 °C) Temporal 77 13 95 % -- --   10/21/20 1700 (!) 151/71 -- -- 78 13 93 % -- --   10/21/20 1655 130/75 -- -- 80 14 91 % -- --   10/21/20 1650 (!) 147/82 -- -- 77 18 96 % -- --   10/21/20 1645 (!) 141/77 -- -- 79 14 90 % -- --   10/21/20 1640 131/79 -- -- 80 11 92 % -- --   10/21/20 1635 (!) 161/76 -- -- 81 17 93 % -- --   10/21/20 1634 (!) 161/76 98.4 °F (36.9 °C) Temporal 81 13 92 % -- --   10/21/20 1159 136/86 98.2 °F (36.8 °C) Tympanic 75 14 95 % 6' (1.829 m) (!) 305 lb (138.3 kg)       General: alert, appears stated age and cooperative   Exam: Incision clean, dry, and intact, no evidence of infection. Neurovascular: Exam normal       Data Review:  Recent Labs     10/22/20  0308   HGB 13.2*     Recent Labs     10/22/20  0308      K 4.5   CREATININE 0.8     Recent Labs     10/22/20  0308   LABGLOM >60           Assessment:     Status Post right tka complex. Doing well postoperatively. Plan:     Continues current post-op course.       Electronically signed by Israel Card MD on 10/22/2020 at 7:17 AM

## 2020-10-22 NOTE — PROGRESS NOTES
Physical Therapy  Name: Lisa Stock  MRN:  [id number]  Date of service:  10/22/2020     10/22/20 1543   General   Chart Reviewed Yes   Subjective   Subjective Pt in bed, agreeable to work with therapy. Pain Screening   Patient Currently in Pain Yes   Pain Assessment   Pain Assessment 0-10   Pain Level 7   Pain Type Surgical pain   Pain Location Knee   Bed Mobility   Supine to Sit Minimal assistance   Sit to Supine Contact guard assistance   Scooting Modified independent   Comment Min assist lowering RLE out of bed   Transfers   Sit to Stand Contact guard assistance   Stand to sit Minimal Assistance   Comment Min assist with eccentric lowering onto bed   Ambulation   Ambulation? Yes   WB Status WBAT   Ambulation 1   Surface level tile   Device Rolling Walker   Assistance Contact guard assistance   Quality of Gait Antalgic, step-to pattern   Gait Deviations Slow Pao;Decreased step length;Decreased step height   Distance 50'   Comments Vc's for technique with rwx and erect posture   Short term goals   Time Frame for Short term goals 2 weeks   Short term goal 1 Independent with bed mobility   Short term goal 2 Independent with transfers   Short term goal 3 Ambulate 400 feet independently with assistive device   Short term goal 4 Up and down 3 steps with hand rail and minimal assist of 1   Conditions Requiring Skilled Therapeutic Intervention   Body structures, Functions, Activity limitations Decreased functional mobility    Assessment Pt able to perform TF and amb CGA, one LOB while standing at toilet to void but able to self-correct. Pt requires min assist with eccentric lowering to sit to avoid \"flopping\". Pt left in bed with all needs in reach following tx.    Activity Tolerance   Activity Tolerance Patient Tolerated treatment well   Safety Devices   Type of devices Call light within reach;Gait belt;Left in bed  (wife present in room)         Electronically signed by Francisco Joshi PTA on 10/22/2020 at 4:02 PM

## 2020-10-23 VITALS
HEART RATE: 87 BPM | OXYGEN SATURATION: 90 % | WEIGHT: 305 LBS | RESPIRATION RATE: 16 BRPM | DIASTOLIC BLOOD PRESSURE: 72 MMHG | TEMPERATURE: 99.3 F | HEIGHT: 72 IN | SYSTOLIC BLOOD PRESSURE: 138 MMHG | BODY MASS INDEX: 41.31 KG/M2

## 2020-10-23 LAB
ANION GAP SERPL CALCULATED.3IONS-SCNC: 11 MMOL/L (ref 7–19)
BUN BLDV-MCNC: 15 MG/DL (ref 8–23)
CALCIUM SERPL-MCNC: 8.3 MG/DL (ref 8.8–10.2)
CHLORIDE BLD-SCNC: 97 MMOL/L (ref 98–111)
CO2: 26 MMOL/L (ref 22–29)
CREAT SERPL-MCNC: 0.8 MG/DL (ref 0.5–1.2)
GFR AFRICAN AMERICAN: >59
GFR NON-AFRICAN AMERICAN: >60
GLUCOSE BLD-MCNC: 203 MG/DL (ref 74–109)
HCT VFR BLD CALC: 39.4 % (ref 42–52)
HEMOGLOBIN: 12.8 G/DL (ref 14–18)
MCH RBC QN AUTO: 28.4 PG (ref 27–31)
MCHC RBC AUTO-ENTMCNC: 32.5 G/DL (ref 33–37)
MCV RBC AUTO: 87.6 FL (ref 80–94)
PDW BLD-RTO: 14 % (ref 11.5–14.5)
PLATELET # BLD: 168 K/UL (ref 130–400)
PMV BLD AUTO: 10.6 FL (ref 9.4–12.4)
POTASSIUM REFLEX MAGNESIUM: 4.3 MMOL/L (ref 3.5–5)
RBC # BLD: 4.5 M/UL (ref 4.7–6.1)
SODIUM BLD-SCNC: 134 MMOL/L (ref 136–145)
WBC # BLD: 14.1 K/UL (ref 4.8–10.8)

## 2020-10-23 PROCEDURE — 85027 COMPLETE CBC AUTOMATED: CPT

## 2020-10-23 PROCEDURE — 6370000000 HC RX 637 (ALT 250 FOR IP): Performed by: ORTHOPAEDIC SURGERY

## 2020-10-23 PROCEDURE — 36415 COLL VENOUS BLD VENIPUNCTURE: CPT

## 2020-10-23 PROCEDURE — 80048 BASIC METABOLIC PNL TOTAL CA: CPT

## 2020-10-23 PROCEDURE — 2500000003 HC RX 250 WO HCPCS: Performed by: ORTHOPAEDIC SURGERY

## 2020-10-23 RX ORDER — OXYCODONE HYDROCHLORIDE 5 MG/1
5 TABLET ORAL EVERY 4 HOURS PRN
Qty: 60 TABLET | Refills: 0
Start: 2020-10-23 | End: 2020-11-22

## 2020-10-23 RX ADMIN — OXYCODONE 10 MG: 5 TABLET ORAL at 04:57

## 2020-10-23 RX ADMIN — ACETAMINOPHEN 650 MG: 325 TABLET ORAL at 04:57

## 2020-10-23 RX ADMIN — TRAMADOL HYDROCHLORIDE 50 MG: 50 TABLET, FILM COATED ORAL at 11:58

## 2020-10-23 RX ADMIN — CLINDAMYCIN IN 5 PERCENT DEXTROSE 900 MG: 18 INJECTION, SOLUTION INTRAVENOUS at 00:11

## 2020-10-23 RX ADMIN — OXYCODONE 20 MG: 5 TABLET ORAL at 15:28

## 2020-10-23 RX ADMIN — ACETAMINOPHEN 650 MG: 325 TABLET ORAL at 11:58

## 2020-10-23 RX ADMIN — TRAMADOL HYDROCHLORIDE 50 MG: 50 TABLET, FILM COATED ORAL at 00:11

## 2020-10-23 RX ADMIN — DOCUSATE SODIUM 50 MG AND SENNOSIDES 8.6 MG 1 TABLET: 8.6; 5 TABLET, FILM COATED ORAL at 09:21

## 2020-10-23 RX ADMIN — TRIAMTERENE AND HYDROCHLOROTHIAZIDE 1 TABLET: 37.5; 25 TABLET ORAL at 09:21

## 2020-10-23 RX ADMIN — ACETAMINOPHEN 650 MG: 325 TABLET ORAL at 00:11

## 2020-10-23 RX ADMIN — TRAMADOL HYDROCHLORIDE 50 MG: 50 TABLET, FILM COATED ORAL at 04:56

## 2020-10-23 RX ADMIN — CLINDAMYCIN IN 5 PERCENT DEXTROSE 900 MG: 18 INJECTION, SOLUTION INTRAVENOUS at 09:21

## 2020-10-23 RX ADMIN — OXYCODONE HYDROCHLORIDE 10 MG: 10 TABLET, FILM COATED, EXTENDED RELEASE ORAL at 09:21

## 2020-10-23 RX ADMIN — TAMSULOSIN HYDROCHLORIDE 0.4 MG: 0.4 CAPSULE ORAL at 09:21

## 2020-10-23 ASSESSMENT — PAIN SCALES - GENERAL
PAINLEVEL_OUTOF10: 5
PAINLEVEL_OUTOF10: 7
PAINLEVEL_OUTOF10: 2
PAINLEVEL_OUTOF10: 5
PAINLEVEL_OUTOF10: 4

## 2020-10-23 NOTE — PROGRESS NOTES
Patient discharged home today with [facility]. Medications and discharge instructions reviewed with patient. A handout of all new medications was given to the patient for reference with all possible side effects highlighted. Patient verbalized understanding. Patient stable upon discharge.   Electronically signed by Yolette Finney RN on 10/23/2020 at 2:51 PM

## 2020-10-23 NOTE — PROGRESS NOTES
Progress Note  Clearance Mio  10/23/2020 1:32 PM  Subjective:   Admit Date:   10/21/2020      CC/ADMIT DX:       Interval History:   Reviewed overnight events and nursing notes. No new physical complaints. His pain is controlled. No N/V. No dysuria. I have reviewed all labs/diagnostics from the last 24hrs. ROS:   I have done a 10 point ROS and all are negative, except what is mentioned in the HPI. Dietary Nutrition Supplements: Standard High Calorie Oral Supplement  DIET GENERAL;    Medications:      sodium chloride 175 mL/hr at 10/21/20 1811      triamterene-hydroCHLOROthiazide  1 tablet Oral Daily    sodium chloride flush  10 mL Intravenous 2 times per day    acetaminophen  650 mg Oral Q6H    sennosides-docusate sodium  1 tablet Oral BID    rivaroxaban  10 mg Oral Daily    oxyCODONE  10 mg Oral 2 times per day    traMADol  50 mg Oral Q6H    tamsulosin  0.4 mg Oral Daily    polyethylene glycol  17 g Oral Daily           Objective:   Vitals: /68   Pulse 81   Temp 98.9 °F (37.2 °C) (Temporal)   Resp 16   Ht 6' (1.829 m)   Wt (!) 305 lb (138.3 kg)   SpO2 91%   BMI 41.37 kg/m²      Intake/Output Summary (Last 24 hours) at 10/23/2020 1332  Last data filed at 10/23/2020 1037  Gross per 24 hour   Intake 2202 ml   Output 2800 ml   Net -598 ml     General appearance: alert and cooperative with exam  Lungs: clear to auscultation bilaterally  Heart: RRR  Abdomen: soft, non-tender; bowel sounds normal; no masses,  no organomegaly  Extremities: extremities normal, atraumatic, no cyanosis or edema  Neurologic:  No obvious focal neurologic deficits. Assessment and Plan:   Principal Problem:    Primary osteoarthritis of right knee  Active Problems:    Arthritis of knee  Resolved Problems:    * No resolved hospital problems. *    ABL Anemia    Plan:  1. Continue present medication(s)  2. Follow with Orthopedics  3.   OK for d/c with [facility]       Discharge planning:    home     Reviewed treatment plans with the patient and/or family.              Electronically signed by Sara Ordaz MD on 10/23/2020 at 1:32 PM
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
## 2020-10-24 NOTE — DISCHARGE SUMMARY
[facility] [address]  Dr. Orion Winchester  Discharge Summary     Reinaldo Salazar is a 71 y.o. male underwent right total knee replacement procedure without complication. Reinaldo Salazar was admitted to the floor following his   recovery in the PACU. Discharge Diagnosis   Right Knee Replacement    Current Inpatient Medications    No current facility-administered medications for this encounter. Post-operatively the patients diet was advanced as tolerated and their incision was checked on POD #1. The incision was clean, dry and intact with no signs of infection. The patient remained neurovascularly intact in the lower extremity and had intact pulses distally. Patients calf remained soft and showed no evidence of DVT. The patient was able to move his right leg and ankle/foot without any problems post-operatively. Physical therapy and occupational therapy were consulted and began working with the patient post-operatively. The patient progressed with PT/OT as would be expected and continued to improve through their stay. The patients pain was initially controlled with IV medications but we were able to transition to oral pain medications soon after arrival to the floor and their pain remained under good control through their hospital stay. From a medical standpoint the patient remained stable and continued to have the medicine team follow throughout their stay. Acute postoperative blood loss anemia after joint replacement being monitored with daily hemoglobin/hematocrit. The patients dressing was changed/incison was checked on day of d/c. The patient will be discharged at this time to home with home health as per total knee protocol with their current diet restrictions and will continue to follow the total knee precautions outlined to them by us and PT/OT. Condition on Discharge: Stable    Plan  Followup at scheduled appointment time (1 month post-op).   Patient was instructed on the use of pain medications, the signs and symptoms of infection, and was given our number to call should they have any questions or concerns following discharge.

## 2020-10-29 ENCOUNTER — TELEPHONE (OUTPATIENT)
Dept: INPATIENT UNIT | Age: 65
End: 2020-10-29

## 2020-10-30 LAB
ANION GAP SERPL CALCULATED.3IONS-SCNC: 13 MMOL/L (ref 7–19)
BASOPHILS ABSOLUTE: 0.1 K/UL (ref 0–0.2)
BASOPHILS RELATIVE PERCENT: 0.6 % (ref 0–1)
BUN BLDV-MCNC: 19 MG/DL (ref 8–23)
CALCIUM SERPL-MCNC: 9.2 MG/DL (ref 8.8–10.2)
CHLORIDE BLD-SCNC: 96 MMOL/L (ref 98–111)
CO2: 24 MMOL/L (ref 22–29)
CREAT SERPL-MCNC: 0.9 MG/DL (ref 0.5–1.2)
EOSINOPHILS ABSOLUTE: 0.2 K/UL (ref 0–0.6)
EOSINOPHILS RELATIVE PERCENT: 1.3 % (ref 0–5)
GFR AFRICAN AMERICAN: >59
GFR NON-AFRICAN AMERICAN: >60
GLUCOSE BLD-MCNC: 133 MG/DL (ref 74–109)
HCT VFR BLD CALC: 39.6 % (ref 42–52)
HEMOGLOBIN: 12.8 G/DL (ref 14–18)
IMMATURE GRANULOCYTES #: 0.1 K/UL
LYMPHOCYTES ABSOLUTE: 2.3 K/UL (ref 1.1–4.5)
LYMPHOCYTES RELATIVE PERCENT: 18.1 % (ref 20–40)
MCH RBC QN AUTO: 28 PG (ref 27–31)
MCHC RBC AUTO-ENTMCNC: 32.3 G/DL (ref 33–37)
MCV RBC AUTO: 86.7 FL (ref 80–94)
MONOCYTES ABSOLUTE: 0.9 K/UL (ref 0–0.9)
MONOCYTES RELATIVE PERCENT: 7.1 % (ref 0–10)
NEUTROPHILS ABSOLUTE: 9.2 K/UL (ref 1.5–7.5)
NEUTROPHILS RELATIVE PERCENT: 72.1 % (ref 50–65)
PDW BLD-RTO: 13.8 % (ref 11.5–14.5)
PLATELET # BLD: 372 K/UL (ref 130–400)
PMV BLD AUTO: 9.8 FL (ref 9.4–12.4)
POTASSIUM SERPL-SCNC: 4 MMOL/L (ref 3.5–5)
RBC # BLD: 4.57 M/UL (ref 4.7–6.1)
SODIUM BLD-SCNC: 133 MMOL/L (ref 136–145)
WBC # BLD: 12.7 K/UL (ref 4.8–10.8)

## 2021-01-15 ENCOUNTER — IMMUNIZATION (OUTPATIENT)
Dept: VACCINE CLINIC | Facility: HOSPITAL | Age: 66
End: 2021-01-15

## 2021-01-15 PROCEDURE — 91301 HC SARSCO02 VAC 100MCG/0.5ML IM: CPT | Performed by: OBSTETRICS & GYNECOLOGY

## 2021-01-15 PROCEDURE — 0012A: CPT | Performed by: OBSTETRICS & GYNECOLOGY

## 2021-01-15 PROCEDURE — 0011A: CPT | Performed by: OBSTETRICS & GYNECOLOGY

## 2021-02-12 ENCOUNTER — IMMUNIZATION (OUTPATIENT)
Dept: VACCINE CLINIC | Facility: HOSPITAL | Age: 66
End: 2021-02-12

## 2021-02-12 PROCEDURE — 91301 HC SARSCO02 VAC 100MCG/0.5ML IM: CPT | Performed by: OBSTETRICS & GYNECOLOGY

## 2021-02-12 PROCEDURE — 0012A: CPT | Performed by: OBSTETRICS & GYNECOLOGY

## 2023-06-01 ENCOUNTER — HOSPITAL ENCOUNTER (OUTPATIENT)
Dept: GENERAL RADIOLOGY | Facility: HOSPITAL | Age: 68
Discharge: HOME | End: 2023-06-01

## 2023-06-01 ENCOUNTER — TRANSCRIBE ORDERS (OUTPATIENT)
Dept: GENERAL RADIOLOGY | Facility: HOSPITAL | Age: 68
End: 2023-06-01

## 2023-06-01 DIAGNOSIS — R76.12 NONSPECIFIC REACTION TO CELL MEDIATED IMMUNITY MEASUREMENT OF GAMMA INTERFERON ANTIGEN RESPONSE WITHOUT ACTIVE TUBERCULOSIS: Primary | ICD-10-CM

## 2023-06-01 PROCEDURE — 71046 X-RAY EXAM CHEST 2 VIEWS: CPT

## 2025-01-02 RX ORDER — AMOXICILLIN 500 MG/1
2000 CAPSULE ORAL
Qty: 16 CAPSULE | Refills: 2 | Status: CANCELLED | OUTPATIENT
Start: 2024-12-24

## 2025-02-03 ENCOUNTER — TELEPHONE (OUTPATIENT)
Dept: GASTROENTEROLOGY | Facility: CLINIC | Age: 70
End: 2025-02-03

## 2025-02-03 NOTE — TELEPHONE ENCOUNTER
Hub staff attempted to follow warm transfer process and was unsuccessful     Caller: Ken Edmondson    Relationship to patient: Self    Best call back number: [phone] (home)     Patient is needing: PT IS CALLING TO SCHEDULE SCOPE FROM RECALL WITH DR. BECKMAN. PLEASE CALL PT BACK

## 2025-02-19 ENCOUNTER — CLINICAL SUPPORT (OUTPATIENT)
Dept: GASTROENTEROLOGY | Facility: CLINIC | Age: 70
End: 2025-02-19
Payer: COMMERCIAL

## 2025-02-19 VITALS — HEIGHT: 72 IN | WEIGHT: 305 LBS | BODY MASS INDEX: 41.31 KG/M2

## 2025-02-19 DIAGNOSIS — Z80.0 FAMILY HX OF COLON CANCER: Primary | ICD-10-CM

## 2025-02-19 PROCEDURE — S0285 CNSLT BEFORE SCREEN COLONOSC: HCPCS | Performed by: NURSE PRACTITIONER

## 2025-02-19 NOTE — PROGRESS NOTES
[facility] [place] [facility]    Primary Physician: [place] MD    2/19/2025    Ken Edmondson   1955      Chief Complaint   Patient presents with    Colonoscopy       Subjective     HPI    Ken Edmondson is a 69 y.o. male who presents as a referral for preventative maintenance. He has no complaints of nausea or vomiting. No change in bowels. No wt loss. No BRBPR. No melena. No abdominal pain.       COLONOSCOPY (02/19/2020 12:45) recall 5 years.  Mother had colon cancer.          Past Medical History:   Diagnosis Date    Arthritis     Hypertension     Joint pain     Kidney stones     Sinus infection 11/13/2017    Sleep apnea with use of continuous positive airway pressure (CPAP)        Past Surgical History:   Procedure Laterality Date    COLONOSCOPY      COLONOSCOPY N/A 02/19/2020    Procedure: COLONOSCOPY WITH ANESTHESIA;  Surgeon: Ludwin Conti MD;  Location: [facility] ENDOSCOPY;  Service: Gastroenterology;  Laterality: N/A;  preop; hx of polyps  postop: normal  PCP Marcos Walls     HERNIA REPAIR  1955    JOINT REPLACEMENT      KNEE ARTHROSCOPY MENISCUS TRANSPLANT Left     URETEROSCOPY LASER LITHOTRIPSY WITH STENT INSERTION Right 11/14/2017    Procedure: URETEROSCOPY LASER LITHOTRIPSY WITH STENT INSERTION;  Surgeon: Ken Headley MD;  Location: [facility] OR;  Service:        Outpatient Medications Marked as Taking for the 2/19/25 encounter (Clinical Support) with Jody Terry APRN   Medication Sig Dispense Refill    amLODIPine (NORVASC) 10 MG tablet Take 1 tablet by mouth Daily. 90 tablet 3    Coenzyme Q10 (CO Q 10 PO) Take 1 tablet by mouth Daily.      Fexofenadine HCl (ALLEGRA PO) Take 1 tablet by mouth Daily As Needed (allergies).      hydroCHLOROthiazide 25 MG tablet Take 1 tablet by mouth Daily. 90 tablet 3    losartan (COZAAR) 100 MG tablet Take 1 tablet by mouth Daily. 90 tablet 3    Multiple Vitamins-Minerals (MULTIVITAMIN WITH MINERALS) tablet tablet Take 1 tablet by mouth  Daily.      Tirzepatide (Mounjaro) 15 MG/0.5ML solution auto-injector Inject 15 mg under the skin into the appropriate area as directed 1 (One) Time Per Week. 6 mL 0       No Known Allergies    Social History     Socioeconomic History    Marital status:    Tobacco Use    Smoking status: Former     Current packs/day: 0.00     Average packs/day: 1 pack/day for 15.0 years (15.0 ttl pk-yrs)     Types: Cigarettes     Quit date: 11/10/1995     Years since quittin.2    Smokeless tobacco: Never   Vaping Use    Vaping status: Never Used   Substance and Sexual Activity    Alcohol use: Yes     Alcohol/week: 5.0 standard drinks of alcohol     Types: 5 Shots of liquor per week     Comment: occ.    Drug use: No    Sexual activity: Yes     Partners: Female     Birth control/protection: None       Family History   Problem Relation Age of Onset    Hypertension Father     Colon cancer Mother        Review of Systems   Constitutional:  Negative for chills, fever and unexpected weight change.   Respiratory:  Negative for shortness of breath.    Cardiovascular:  Negative for chest pain.   Gastrointestinal:  Negative for abdominal distention, abdominal pain, anal bleeding, blood in stool, constipation, diarrhea, nausea and vomiting.       Objective     There were no vitals filed for this visit.      25  0905   Weight: (!) 138 kg (305 lb)     Body mass index is 41.37 kg/m².    Physical Exam    Imaging Results (Most Recent)       None            Assessment & Plan     Diagnoses and all orders for this visit:    1. Family hx of colon cancer (Primary)  -     Case Request; Standing  -     Case Request    Other orders  -     Implement Anesthesia Orders Day of Procedure; Standing  -     Follow Anesthesia Guidelines / Protocol; Future      Schedule colonoscopy. Miralax prep.   Hold mounjaro 1 week prior to procedure..       This visit has been rescheduled as a phone visit to office closed due to inclement weather. Total time of  discussion was 10 minutes.          COLONOSCOPY WITH ANESTHESIA (N/A)  All risks, benefits, alternatives, and indications of colonoscopy procedure have been discussed with the patient. Risks to include perforation of the colon requiring possible surgery or colostomy, risk of bleeding from biopsies or removal of colon tissue, possibility of missing a colon polyp or cancer, or adverse drug reaction.  Benefits to include the diagnosis and management of disease of the colon and rectum. Alternatives to include barium enema, radiographic evaluation, lab testing or no intervention. Pt verbalizes understanding and agrees.     There are no Patient Instructions on file for this visit.    Jody Terry, APRN

## 2025-04-18 ENCOUNTER — HOSPITAL ENCOUNTER (OUTPATIENT)
Facility: HOSPITAL | Age: 70
Setting detail: HOSPITAL OUTPATIENT SURGERY
Discharge: HOME | End: 2025-04-18
Attending: INTERNAL MEDICINE | Admitting: INTERNAL MEDICINE
Payer: MEDICARE

## 2025-04-18 ENCOUNTER — ANESTHESIA (OUTPATIENT)
Dept: GASTROENTEROLOGY | Facility: HOSPITAL | Age: 70
End: 2025-04-18
Payer: MEDICARE

## 2025-04-18 ENCOUNTER — ANESTHESIA EVENT (OUTPATIENT)
Dept: GASTROENTEROLOGY | Facility: HOSPITAL | Age: 70
End: 2025-04-18
Payer: MEDICARE

## 2025-04-18 VITALS
TEMPERATURE: 97.7 F | DIASTOLIC BLOOD PRESSURE: 76 MMHG | SYSTOLIC BLOOD PRESSURE: 128 MMHG | HEART RATE: 54 BPM | BODY MASS INDEX: 38.33 KG/M2 | OXYGEN SATURATION: 97 % | WEIGHT: 283 LBS | HEIGHT: 72 IN | RESPIRATION RATE: 14 BRPM

## 2025-04-18 DIAGNOSIS — Z80.0 FAMILY HX OF COLON CANCER: ICD-10-CM

## 2025-04-18 PROCEDURE — 88305 TISSUE EXAM BY PATHOLOGIST: CPT | Performed by: INTERNAL MEDICINE

## 2025-04-18 PROCEDURE — 45385 COLONOSCOPY W/LESION REMOVAL: CPT | Performed by: INTERNAL MEDICINE

## 2025-04-18 PROCEDURE — 25810000003 SODIUM CHLORIDE 0.9 % SOLUTION: Performed by: ANESTHESIOLOGY

## 2025-04-18 PROCEDURE — 25010000002 PROPOFOL 10 MG/ML EMULSION

## 2025-04-18 PROCEDURE — 25010000002 LIDOCAINE PF 2% 2 % SOLUTION

## 2025-04-18 RX ORDER — LIDOCAINE HYDROCHLORIDE 10 MG/ML
0.5 INJECTION, SOLUTION EPIDURAL; INFILTRATION; INTRACAUDAL; PERINEURAL ONCE AS NEEDED
Status: DISCONTINUED | OUTPATIENT
Start: 2025-04-18 | End: 2025-04-18 | Stop reason: HOSPADM

## 2025-04-18 RX ORDER — SODIUM CHLORIDE 9 MG/ML
500 INJECTION, SOLUTION INTRAVENOUS CONTINUOUS PRN
Status: DISPENSED | OUTPATIENT
Start: 2025-04-18 | End: 2025-04-18

## 2025-04-18 RX ORDER — PROPOFOL 10 MG/ML
VIAL (ML) INTRAVENOUS AS NEEDED
Status: DISCONTINUED | OUTPATIENT
Start: 2025-04-18 | End: 2025-04-18 | Stop reason: SURG

## 2025-04-18 RX ORDER — ONDANSETRON 2 MG/ML
4 INJECTION INTRAMUSCULAR; INTRAVENOUS ONCE AS NEEDED
Status: DISCONTINUED | OUTPATIENT
Start: 2025-04-18 | End: 2025-04-18 | Stop reason: HOSPADM

## 2025-04-18 RX ORDER — SODIUM CHLORIDE 0.9 % (FLUSH) 0.9 %
10 SYRINGE (ML) INJECTION AS NEEDED
Status: DISCONTINUED | OUTPATIENT
Start: 2025-04-18 | End: 2025-04-18 | Stop reason: HOSPADM

## 2025-04-18 RX ORDER — LIDOCAINE HYDROCHLORIDE 20 MG/ML
INJECTION, SOLUTION EPIDURAL; INFILTRATION; INTRACAUDAL; PERINEURAL AS NEEDED
Status: DISCONTINUED | OUTPATIENT
Start: 2025-04-18 | End: 2025-04-18 | Stop reason: SURG

## 2025-04-18 RX ADMIN — SODIUM CHLORIDE 500 ML: 0.9 INJECTION, SOLUTION INTRAVENOUS at 09:37

## 2025-04-18 RX ADMIN — LIDOCAINE HYDROCHLORIDE 100 MG: 20 INJECTION, SOLUTION EPIDURAL; INFILTRATION; INTRACAUDAL; PERINEURAL at 09:59

## 2025-04-18 RX ADMIN — PROPOFOL 400 MG: 10 INJECTION, EMULSION INTRAVENOUS at 09:59

## 2025-04-18 NOTE — H&P
[facility]  Pre Procedure History & Physical    Chief Complaint:   Screening    Subjective     HPI:   Screening    Past Medical History:   Past Medical History:   Diagnosis Date    Arthritis     Hypertension     Joint pain     Kidney stones     Sinus infection 11/13/2017    Sleep apnea with use of continuous positive airway pressure (CPAP)        Past Surgical History:  Past Surgical History:   Procedure Laterality Date    COLONOSCOPY      COLONOSCOPY N/A 02/19/2020    Procedure: COLONOSCOPY WITH ANESTHESIA;  Surgeon: Ludwin Conti MD;  Location: [facility] ENDOSCOPY;  Service: Gastroenterology;  Laterality: N/A;  preop; hx of polyps  postop: normal  PCP Marcos Walls     HERNIA REPAIR  1955    JOINT REPLACEMENT      KNEE ARTHROSCOPY MENISCUS TRANSPLANT Left     URETEROSCOPY LASER LITHOTRIPSY WITH STENT INSERTION Right 11/14/2017    Procedure: URETEROSCOPY LASER LITHOTRIPSY WITH STENT INSERTION;  Surgeon: Ken Headley MD;  Location: [facility] OR;  Service:        Family History:  Family History   Problem Relation Age of Onset    Hypertension Father     Colon cancer Mother        Social History:   reports that he quit smoking about 29 years ago. His smoking use included cigarettes. He has a 15 pack-year smoking history. He has never used smokeless tobacco. He reports current alcohol use of about 5.0 standard drinks of alcohol per week. He reports that he does not use drugs.    Medications:   Prior to Admission medications    Medication Sig Start Date End Date Taking? Authorizing Provider   amLODIPine (NORVASC) 10 MG tablet Take 1 tablet by mouth Daily. 4/2/20  Yes Emergency, Nurse Fabiola, RN   Coenzyme Q10 (CO Q 10 PO) Take 1 tablet by mouth Daily.   Yes ProviderJair MD   Fexofenadine HCl (ALLEGRA PO) Take 1 tablet by mouth Daily As Needed (allergies).   Yes ProviderJair MD   hydroCHLOROthiazide 25 MG tablet Take 1 tablet by mouth Daily. 11/29/24  Yes    losartan (COZAAR)  100 MG tablet Take 1 tablet by mouth Daily.   Yes Provider, MD Jair   Multiple Vitamins-Minerals (MULTIVITAMIN WITH MINERALS) tablet tablet Take 1 tablet by mouth Daily.   Yes ProviderJair MD   amLODIPine (NORVASC) 10 MG tablet Take 1 tablet by mouth every day 8/31/20      amLODIPine (NORVASC) 10 MG tablet Take 1 tablet by mouth Daily. 6/4/24      losartan (COZAAR) 100 MG tablet TAKE 1 TABLET BY MOUTH EVERY DAY 8/31/20      losartan (COZAAR) 100 MG tablet Take one tablet by mouth once daily. 5/25/23      losartan (COZAAR) 100 MG tablet Take 1 tablet by mouth Daily. 4/3/24      Mounjaro 2.5 MG/0.5ML solution pen-injector Inject 2.5 mg under the skin into the appropriate area as directed Every 7 (Seven) Days. 6/28/23      NAPROXEN PO Take 440 mg by mouth 2 (Two) Times a Day As Needed (pain).    ProviderJair MD   Tirzepatide (Mounjaro) 10 MG/0.5ML solution pen-injector pen Inject 0.5 mL under the skin into the appropriate area as directed 1 (One) Time Per Week. 4/2/24      Tirzepatide (Mounjaro) 12.5 MG/0.5ML solution pen-injector pen Inject 0.5 mL under the skin into the appropriate area as directed 1 (One) Time Per Week. 2/8/24      Tirzepatide (Mounjaro) 12.5 MG/0.5ML solution pen-injector pen Inject 0.5 mL under the skin into the appropriate area as directed 1 (One) Time Per Week. 2/12/24      Tirzepatide (Mounjaro) 12.5 MG/0.5ML solution pen-injector Inject 0.5 mL under the skin into the appropriate area as directed 1 (One) Time Per Week. 12/13/23      Tirzepatide (Mounjaro) 15 MG/0.5ML solution auto-injector Inject 15 mg under the skin into the appropriate area as directed 1 (One) Time Per Week. 3/6/25      Tirzepatide (Mounjaro) 5 MG/0.5ML solution pen-injector Inject 5 mg sub-q every week as directed. 8/23/23      Tirzepatide (Mounjaro) 7.5 MG/0.5ML solution pen-injector Inject 0.5 mL under the skin into the appropriate area as directed 1 (One) Time Per Week. 9/19/23     "  triamterene-hydrochlorothiazide (MAXZIDE-25) 37.5-25 MG per tablet Take 1 tablet by mouth Daily. 4/25/20   Emergency, Nurse Epic, RN   triamterene-hydrochlorothiazide (MAXZIDE-25) 37.5-25 MG per tablet TAKE 1 TABLET BY MOUTH EVERY DAY 8/31/20          Allergies:  Patient has no known allergies.    ROS:    General: Weight stable  Resp: No SOA  Cardiovascular: No CP    Objective     Blood pressure 136/71, pulse 51, temperature 97.7 °F (36.5 °C), resp. rate 22, height 182.9 cm (72\"), weight 128 kg (283 lb), SpO2 95%.    Physical Exam   Constitutional: Pt is oriented to person, place, and in no distress.   Cardiovascular: Normal rate, regular rhythm.    Pulmonary/Chest: Effort normal. No respiratory distress.   Abdominal: Non-distended.  Psychiatric: Mood, memory, affect and judgment appear normal.     Assessment & Plan     Diagnosis:  Screening    Anticipated Surgical Procedure:  Colonoscopy    The risks, benefits, and alternatives of this procedure have been discussed with the patient or the responsible party- the patient understands and agrees to proceed.    EMR Dragon/transcription disclaimer:  Much of this encounter note is electronic transcription/translation of spoken language to printed text.  The electronic translation of spoken language may be erroneous, or at times, nonsensical words or phrases may be inadvertently transcribed.  Although I have reviewed the note for such errors, some may still exist.  "

## 2025-04-18 NOTE — ANESTHESIA POSTPROCEDURE EVALUATION
"Patient: Ken Edmondson    Procedure Summary       Date: 04/18/25 Room / Location: [facility] ENDOSCOPY 6 / [facility] ENDOSCOPY    Anesthesia Start: 0955 Anesthesia Stop: 1026    Procedure: COLONOSCOPY WITH ANESTHESIA Diagnosis:       Family hx of colon cancer      (Family hx of colon cancer [Z80.0])    Surgeons: Ludwin Conti MD Provider: Shan Givens CRNA    Anesthesia Type: MAC ASA Status: 3            Anesthesia Type: MAC    Vitals  Vitals Value Taken Time   BP     Temp     Pulse 58 04/18/25 10:28   Resp     SpO2 95 % 04/18/25 10:28   Vitals shown include unfiled device data.        Post Anesthesia Care and Evaluation    Patient location during evaluation: PHASE II  Patient participation: complete - patient participated  Level of consciousness: awake and alert  Pain management: adequate    Airway patency: patent  Anesthetic complications: No anesthetic complications  PONV Status: none  Cardiovascular status: acceptable  Respiratory status: acceptable  Hydration status: acceptable    Comments: Blood pressure 136/71, pulse 51, temperature 97.7 °F (36.5 °C), resp. rate 22, height 182.9 cm (72\"), weight 128 kg (283 lb), SpO2 95%.    No anesthesia care post op    "

## 2025-04-18 NOTE — ANESTHESIA PREPROCEDURE EVALUATION
Anesthesia Evaluation     Patient summary reviewed   history of anesthetic complications:  difficult airway  NPO Solid Status: > 8 hours  NPO Liquid Status: > 2 hours           Airway   Mallampati: II  TM distance: <3 FB  No difficulty expected  Dental - normal exam     Pulmonary    (+) ,sleep apnea on CPAP  (-) COPD, asthma, not a smoker  Cardiovascular   Exercise tolerance: good (4-7 METS)    (+) hypertension, hyperlipidemia  (-) past MI, CAD, dysrhythmias      Neuro/Psych  (-) seizures, TIA, CVA  GI/Hepatic/Renal/Endo    (+) obesity  (-) liver disease, no renal disease, diabetes    Musculoskeletal     Abdominal    Substance History      OB/GYN          Other            Phys Exam Other: Difficult Airway: Yes  Final Airway Type: endotracheal airway  Mask Difficulty Assessment: 2 - vent by mask + OA or adjuvant +/- NMBA  Final Endotracheal Airway: ETT  Cuffed: Yes  Cormack-Lehane Classification: grade IIb - view of arytenoids or posterior of glottis only  Technique Used For Successful Placement: video laryngoscopy  Devices/Methods Used in Placement: intubating stylet  Blade Type: Miguel  Blade Size: 4  ETT Size (mm): 7.5  Number of Attempts at Approach: 2                  Anesthesia Plan    ASA 3     MAC     intravenous induction     Anesthetic plan, risks, benefits, and alternatives have been provided, discussed and informed consent has been obtained with: patient and spouse/significant other.

## 2025-04-21 LAB
CYTO UR: NORMAL
LAB AP CASE REPORT: NORMAL
Lab: NORMAL
PATH REPORT.FINAL DX SPEC: NORMAL
PATH REPORT.GROSS SPEC: NORMAL

## 2025-07-09 ENCOUNTER — TELEPHONE (OUTPATIENT)
Dept: GASTROENTEROLOGY | Facility: CLINIC | Age: 70
End: 2025-07-09
Payer: MEDICARE

## 2025-07-09 NOTE — TELEPHONE ENCOUNTER
Left message for the patient that the office is needing his correct ID number for his supplement insurance.

## (undated) DEVICE — ZINACTIVE USE 2539609 APPLICATOR MEDICATED 10.5 CC SOLUTION HI LT ORNG CHLORAPREP

## (undated) DEVICE — SURGICAL PROCEDURE PACK KNEE TOT DBD CDS LOURDES HOSP LF

## (undated) DEVICE — ZIMMER® STERILE DISPOSABLE TOURNIQUET CUFF WITH PLC, DUAL PORT, SINGLE BLADDER, 34 IN. (86 CM)

## (undated) DEVICE — NON-WOVEN ADHESIVE WOUND DRESSING: Brand: PRIMAPORE ADHESIVE DRESSING 30*10CM

## (undated) DEVICE — SUTURE VCRL SZ 2-0 L36IN ABSRB UD L36MM CT-1 1/2 CIR J945H

## (undated) DEVICE — BANDAGE COMPR W6INXL12FT SMOOTH FOR LIMB EXSANG ESMARCH

## (undated) DEVICE — NON-WOVEN ADHESIVE WOUND DRESSING: Brand: PRIMAPORE ADHESIVE DRESSING 10*8CM

## (undated) DEVICE — FAN SPRAY KIT: Brand: PULSAVAC®

## (undated) DEVICE — GLOVE SURG SZ 8.5 L11.6IN FNGR THK12.6MIL CUF THK8.3MIL BRN

## (undated) DEVICE — SUTURE ETHLN SZ 4-0 L18IN NONABSORBABLE BLK L19MM PS-2 3/8 1667H

## (undated) DEVICE — SENSR O2 OXIMAX FNGR A/ 18IN NONSTR

## (undated) DEVICE — GLOVE SURG SZ 75 L12IN FNGR THK87MIL DK GRN LTX FREE ISOLEX

## (undated) DEVICE — GEL US 20GM NONIRRITATING OVERWRAPPED FILE PCH TRNSMIT

## (undated) DEVICE — BLADE SAW W12.5XL70MM THK1MM RECIP DBL SIDE OFFSET

## (undated) DEVICE — Z DUPLICATE USE 2392649 LARYNGOSCOPE VID TI SPECTRM LOPRO S4 GLIDESCOPE

## (undated) DEVICE — PAD,ARMBOARD,CONV,FOAM,2X8X20",12PR/CS: Brand: MEDLINE

## (undated) DEVICE — SOLUTION IV IRRIG POUR BRL 0.9% SODIUM CHL 2F7124

## (undated) DEVICE — Z INACTIVE USE 2660664 SOLUTION IRRIG 3000ML 0.9% SOD CHL USP UROMATIC PLAS CONT

## (undated) DEVICE — SUTURE VCRL SZ 2-0 L27IN ABSRB UD L26MM SH 1/2 CIR J417H

## (undated) DEVICE — BLADE SURG NO11 C STL RETRCT DISPOSABLE

## (undated) DEVICE — DUAL CUT SAGITTAL BLADE

## (undated) DEVICE — GW SENSR DUALFLEX NITNL STR .038IN 3X150CM

## (undated) DEVICE — GLOVE SURG SZ 75 L12IN FNGR THK79MIL GRN LTX FREE

## (undated) DEVICE — ARM BOARD PAD: Brand: DEVON

## (undated) DEVICE — SYSTEM SKIN CLSR 22CM DERMBND PRINEO

## (undated) DEVICE — STERILE POLYISOPRENE POWDER-FREE SURGICAL GLOVES: Brand: PROTEXIS

## (undated) DEVICE — DUAL LUMEN URETERAL CATHETER

## (undated) DEVICE — GLOVE SURG SZ 85 L12IN FNGR ORTHO 126MIL CRM LTX FREE

## (undated) DEVICE — THREE QUARTER SHEET: Brand: CONVERTORS

## (undated) DEVICE — MINOR CDS: Brand: MEDLINE INDUSTRIES, INC.

## (undated) DEVICE — CVR BRD ARM 13X30

## (undated) DEVICE — BLADE RMR L51MM PAT PILOT H

## (undated) DEVICE — CUFF,BP,DISP,1 TUBE,ADULT,HP: Brand: MEDLINE

## (undated) DEVICE — 3M™ STERI-DRAPE™ INSTRUMENT POUCH 1018: Brand: STERI-DRAPE™

## (undated) DEVICE — 3M™ STERI-DRAPE™ U-DRAPE 1015: Brand: STERI-DRAPE™

## (undated) DEVICE — Device

## (undated) DEVICE — GLOVE SURG SZ 8 L12IN FNGR THK13MIL BRN LTX SYN POLYMER W

## (undated) DEVICE — GLOVE SURG SZ 8 L12IN FNGR THK79MIL GRN LTX FREE

## (undated) DEVICE — TBG SMPL FLTR LINE NASL 02/C02 A/ BX/100

## (undated) DEVICE — Device: Brand: POWER-FLO®

## (undated) DEVICE — URETERAL ACCESS SHEATH SET: Brand: NAVIGATOR HD

## (undated) DEVICE — TRAY EPI 25GA L3.5IN 0.75% BIPIVCAIN 8.25% D CONTAIN BPA

## (undated) DEVICE — 3M™ STERI-STRIP™ REINFORCED ADHESIVE SKIN CLOSURES, R1547, 1/2 IN X 4 IN (12 MM X 100 MM), 6 STRIPS/ENVELOPE: Brand: 3M™ STERI-STRIP™

## (undated) DEVICE — GLOVE SURG SZ 85 CRM LTX FREE POLYISOPRENE POLYMER BEAD ANTI

## (undated) DEVICE — PK TURNOVER CYSTO RM

## (undated) DEVICE — DRAPE,U/ SHT,SPLIT,PLAS,STERIL: Brand: MEDLINE

## (undated) DEVICE — YANKAUER,BULB TIP WITH VENT: Brand: ARGYLE

## (undated) DEVICE — BLADE LARYNSCP SZ 4 TI DISP SPECTRM LOPRO GLIDESCOPE

## (undated) DEVICE — THE CHANNEL CLEANING BRUSH IS A NYLON FLEXI BRUSH ATTACHED TO A FLEXIBLE PLASTIC SHEATH DESIGNED TO SAFELY REMOVE DEBRIS FROM FLEXIBLE ENDOSCOPES.

## (undated) DEVICE — MCLASS OSCILLATING SAW BLADE 19 X 1.27 (0.050") X 90 MM: Brand: MCLASS

## (undated) DEVICE — ENDOSCOPIC SEAL URO 1 SIZE FITS ALL: Brand: ENDOSCOPIC SEAL

## (undated) DEVICE — CURAVIEW LED LARYNSCP BLDE

## (undated) DEVICE — SUTURE VCRL SZ 1 L18IN ABSRB UD L36MM CT-1 1/2 CIR J841D

## (undated) DEVICE — GOWN,PRECEPT,XLNG/XXLARGE,STRL: Brand: MEDLINE

## (undated) DEVICE — TUBE ET 8MM NSL ORAL BASIC CUF INTMED MURPHY EYE RADPQ MRK

## (undated) DEVICE — DEFENDO AIR WATER SUCTION AND BIOPSY VALVE KIT FOR  OLYMPUS: Brand: DEFENDO AIR/WATER/SUCTION AND BIOPSY VALVE

## (undated) DEVICE — DRAPE,EXTREMITY,89X128,STERILE: Brand: MEDLINE

## (undated) DEVICE — Device: Brand: SINGLE USE ELECTROSURGICAL SNARE SD-400

## (undated) DEVICE — PK CYSTO 30

## (undated) DEVICE — SHEET,DRAPE,53X77,STERILE: Brand: MEDLINE

## (undated) DEVICE — NON-STERILE (14 X 30CM) COVER: Brand: CIV-FLEX™ TRANSDUCER COVER

## (undated) DEVICE — Device: Brand: DEFENDO AIR/WATER/SUCTION AND BIOPSY VALVE

## (undated) DEVICE — GLV SURG BIOGEL M LTX PF 7 1/2

## (undated) DEVICE — COMPONENT ARTC SURF PS 10-12 GH 10 MM LT TIB FIX BEAR
Type: IMPLANTABLE DEVICE | Site: KNEE | Status: NON-FUNCTIONAL
Removed: 2018-10-24

## (undated) DEVICE — ARGYLE YANKAUER BULB TIP WITH VENT: Brand: ARGYLE

## (undated) DEVICE — CHLORAPREP 26ML ORANGE

## (undated) DEVICE — GLOVE SURG SZ 85 L12IN FNGR THK79MIL GRN LTX FREE

## (undated) DEVICE — MASK,OXYGEN,MED CONC,ADLT,7' TUB, UC: Brand: PENDING

## (undated) DEVICE — LARGE BONE HALL BLADE, RECIPROCATOR, 12.5 X 76 X 1.27 MM: Brand: HALL

## (undated) DEVICE — THE SINGLE USE ETRAP – POLYP TRAP IS USED FOR SUCTION RETRIEVAL OF ENDOSCOPICALLY REMOVED POLYPS.: Brand: ETRAP

## (undated) DEVICE — NEEDLE ECHOGENIC 20GA L4IN INSUL W/ 30DEG BVL EXTN SET

## (undated) DEVICE — ZIMMER® STERILE DISPOSABLE TOURNIQUET CUFF WITH PLC, DUAL PORT, SINGLE BLADDER, 18 IN. (46 CM)